# Patient Record
Sex: MALE | Race: WHITE | Employment: OTHER | URBAN - NONMETROPOLITAN AREA
[De-identification: names, ages, dates, MRNs, and addresses within clinical notes are randomized per-mention and may not be internally consistent; named-entity substitution may affect disease eponyms.]

---

## 2017-01-13 DIAGNOSIS — I10 ESSENTIAL HYPERTENSION: ICD-10-CM

## 2017-01-13 RX ORDER — CARVEDILOL 12.5 MG/1
12.5 TABLET ORAL 2 TIMES DAILY WITH MEALS
Qty: 180 TABLET | Refills: 3 | Status: SHIPPED | OUTPATIENT
Start: 2017-01-13 | End: 2017-08-16 | Stop reason: SDUPTHER

## 2017-01-13 RX ORDER — LISINOPRIL 20 MG/1
20 TABLET ORAL DAILY
Qty: 90 TABLET | Refills: 3 | Status: SHIPPED | OUTPATIENT
Start: 2017-01-13 | End: 2017-08-16 | Stop reason: SDUPTHER

## 2017-02-16 ENCOUNTER — TELEPHONE (OUTPATIENT)
Dept: CARDIOLOGY | Age: 72
End: 2017-02-16

## 2017-03-06 ENCOUNTER — TELEPHONE (OUTPATIENT)
Dept: CARDIOLOGY | Age: 72
End: 2017-03-06

## 2017-03-20 RX ORDER — CLOPIDOGREL BISULFATE 75 MG/1
TABLET ORAL
Qty: 90 TABLET | Refills: 0 | Status: SHIPPED | OUTPATIENT
Start: 2017-03-20 | End: 2017-06-30 | Stop reason: SDUPTHER

## 2017-06-30 RX ORDER — CLOPIDOGREL BISULFATE 75 MG/1
TABLET ORAL
Qty: 90 TABLET | Refills: 3 | Status: SHIPPED | OUTPATIENT
Start: 2017-06-30 | End: 2017-06-30 | Stop reason: SDUPTHER

## 2017-06-30 RX ORDER — CLOPIDOGREL BISULFATE 75 MG/1
TABLET ORAL
Qty: 90 TABLET | Refills: 3 | Status: SHIPPED | OUTPATIENT
Start: 2017-06-30 | End: 2017-08-16 | Stop reason: SDUPTHER

## 2017-08-16 ENCOUNTER — OFFICE VISIT (OUTPATIENT)
Dept: CARDIOLOGY | Age: 72
End: 2017-08-16
Payer: COMMERCIAL

## 2017-08-16 ENCOUNTER — TELEPHONE (OUTPATIENT)
Dept: CARDIOLOGY | Age: 72
End: 2017-08-16

## 2017-08-16 VITALS
DIASTOLIC BLOOD PRESSURE: 70 MMHG | HEART RATE: 68 BPM | WEIGHT: 217 LBS | SYSTOLIC BLOOD PRESSURE: 138 MMHG | BODY MASS INDEX: 31.07 KG/M2 | HEIGHT: 70 IN

## 2017-08-16 DIAGNOSIS — E78.5 HYPERLIPIDEMIA, UNSPECIFIED HYPERLIPIDEMIA TYPE: ICD-10-CM

## 2017-08-16 DIAGNOSIS — E78.2 MIXED HYPERLIPIDEMIA: ICD-10-CM

## 2017-08-16 DIAGNOSIS — I25.10 CORONARY ARTERY DISEASE INVOLVING NATIVE CORONARY ARTERY OF NATIVE HEART WITHOUT ANGINA PECTORIS: ICD-10-CM

## 2017-08-16 DIAGNOSIS — I25.5 ISCHEMIC DILATED CARDIOMYOPATHY (HCC): ICD-10-CM

## 2017-08-16 DIAGNOSIS — I25.10 CORONARY ARTERY DISEASE INVOLVING NATIVE CORONARY ARTERY OF NATIVE HEART WITHOUT ANGINA PECTORIS: Primary | ICD-10-CM

## 2017-08-16 DIAGNOSIS — I10 ESSENTIAL HYPERTENSION: ICD-10-CM

## 2017-08-16 DIAGNOSIS — Z12.5 SCREENING PSA (PROSTATE SPECIFIC ANTIGEN): ICD-10-CM

## 2017-08-16 DIAGNOSIS — I34.0 MITRAL VALVE INSUFFICIENCY, UNSPECIFIED ETIOLOGY: ICD-10-CM

## 2017-08-16 DIAGNOSIS — Z95.0 PACEMAKER: ICD-10-CM

## 2017-08-16 DIAGNOSIS — I44.2 CHB (COMPLETE HEART BLOCK) (HCC): ICD-10-CM

## 2017-08-16 DIAGNOSIS — I42.0 ISCHEMIC DILATED CARDIOMYOPATHY (HCC): ICD-10-CM

## 2017-08-16 LAB
ALBUMIN SERPL-MCNC: 4.2 G/DL (ref 3.5–5.2)
ALP BLD-CCNC: 52 U/L (ref 40–130)
ALT SERPL-CCNC: 28 U/L (ref 5–41)
ANION GAP SERPL CALCULATED.3IONS-SCNC: 19 MMOL/L (ref 7–19)
AST SERPL-CCNC: 18 U/L (ref 5–40)
BILIRUB SERPL-MCNC: 0.7 MG/DL (ref 0.2–1.2)
BUN BLDV-MCNC: 17 MG/DL (ref 8–23)
CALCIUM SERPL-MCNC: 10 MG/DL (ref 8.8–10.2)
CHLORIDE BLD-SCNC: 102 MMOL/L (ref 98–111)
CHOLESTEROL, TOTAL: 117 MG/DL (ref 160–199)
CO2: 20 MMOL/L (ref 22–29)
CREAT SERPL-MCNC: 0.9 MG/DL (ref 0.5–1.2)
GFR NON-AFRICAN AMERICAN: >60
GLUCOSE BLD-MCNC: 123 MG/DL (ref 74–109)
HCT VFR BLD CALC: 42.9 % (ref 42–52)
HDLC SERPL-MCNC: 49 MG/DL (ref 55–121)
HEMOGLOBIN: 14.7 G/DL (ref 14–18)
LDL CHOLESTEROL CALCULATED: 42 MG/DL
MCH RBC QN AUTO: 32.2 PG (ref 27–31)
MCHC RBC AUTO-ENTMCNC: 34.3 G/DL (ref 33–37)
MCV RBC AUTO: 93.9 FL (ref 80–94)
PDW BLD-RTO: 12.7 % (ref 11.5–14.5)
PLATELET # BLD: 263 K/UL (ref 130–400)
PMV BLD AUTO: 10.3 FL (ref 9.4–12.4)
POTASSIUM SERPL-SCNC: 4.3 MMOL/L (ref 3.5–5)
PROSTATE SPECIFIC ANTIGEN: 0.71 NG/ML (ref 0–4)
RBC # BLD: 4.57 M/UL (ref 4.7–6.1)
SODIUM BLD-SCNC: 141 MMOL/L (ref 136–145)
TOTAL PROTEIN: 8 G/DL (ref 6.6–8.7)
TRIGL SERPL-MCNC: 132 MG/DL (ref 150–199)
WBC # BLD: 7.8 K/UL (ref 4.8–10.8)

## 2017-08-16 PROCEDURE — 93280 PM DEVICE PROGR EVAL DUAL: CPT | Performed by: NURSE PRACTITIONER

## 2017-08-16 PROCEDURE — 99214 OFFICE O/P EST MOD 30 MIN: CPT | Performed by: NURSE PRACTITIONER

## 2017-08-16 RX ORDER — SILDENAFIL 100 MG/1
100 TABLET, FILM COATED ORAL PRN
Qty: 16 TABLET | Refills: 2 | Status: SHIPPED | OUTPATIENT
Start: 2017-08-16

## 2017-08-16 RX ORDER — ATORVASTATIN CALCIUM 20 MG/1
20 TABLET, FILM COATED ORAL DAILY
Qty: 90 TABLET | Refills: 1 | Status: SHIPPED | OUTPATIENT
Start: 2017-08-16 | End: 2018-01-19 | Stop reason: SDUPTHER

## 2017-08-16 RX ORDER — CARVEDILOL 12.5 MG/1
12.5 TABLET ORAL 2 TIMES DAILY WITH MEALS
Qty: 180 TABLET | Refills: 3 | Status: SHIPPED | OUTPATIENT
Start: 2017-08-16 | End: 2018-02-21 | Stop reason: SDUPTHER

## 2017-08-16 RX ORDER — METOCLOPRAMIDE 10 MG/1
10 TABLET ORAL 4 TIMES DAILY
Qty: 360 TABLET | Refills: 1 | Status: SHIPPED | OUTPATIENT
Start: 2017-08-16 | End: 2019-02-27 | Stop reason: SDUPTHER

## 2017-08-16 RX ORDER — PANTOPRAZOLE SODIUM 40 MG/1
40 TABLET, DELAYED RELEASE ORAL DAILY
Qty: 90 TABLET | Refills: 3 | Status: SHIPPED | OUTPATIENT
Start: 2017-08-16 | End: 2018-02-21 | Stop reason: SDUPTHER

## 2017-08-16 RX ORDER — CLOPIDOGREL BISULFATE 75 MG/1
TABLET ORAL
Qty: 90 TABLET | Refills: 3 | Status: SHIPPED | OUTPATIENT
Start: 2017-08-16 | End: 2018-02-21 | Stop reason: SDUPTHER

## 2017-08-16 RX ORDER — LISINOPRIL 20 MG/1
20 TABLET ORAL DAILY
Qty: 90 TABLET | Refills: 3 | Status: SHIPPED | OUTPATIENT
Start: 2017-08-16 | End: 2018-02-21 | Stop reason: SDUPTHER

## 2017-08-16 RX ORDER — OMEPRAZOLE 20 MG/1
20 CAPSULE, DELAYED RELEASE ORAL DAILY
Qty: 90 CAPSULE | Refills: 1 | Status: SHIPPED | OUTPATIENT
Start: 2017-08-16 | End: 2017-08-16

## 2017-10-24 ENCOUNTER — TELEPHONE (OUTPATIENT)
Dept: CARDIOLOGY | Age: 72
End: 2017-10-24

## 2017-10-24 NOTE — TELEPHONE ENCOUNTER
Patient called requesting a prescription for medication for depression. Advised we only fill cardiac medication in this office and patient would need to call PCP for this type of prescription. Understanding voiced.

## 2017-11-17 ENCOUNTER — TELEPHONE (OUTPATIENT)
Dept: CARDIOLOGY | Age: 72
End: 2017-11-17

## 2018-01-10 ENCOUNTER — OFFICE VISIT (OUTPATIENT)
Dept: URGENT CARE | Age: 73
End: 2018-01-10
Payer: COMMERCIAL

## 2018-01-10 VITALS
DIASTOLIC BLOOD PRESSURE: 80 MMHG | RESPIRATION RATE: 20 BRPM | HEART RATE: 90 BPM | SYSTOLIC BLOOD PRESSURE: 137 MMHG | OXYGEN SATURATION: 96 % | WEIGHT: 216.38 LBS | TEMPERATURE: 98.2 F | BODY MASS INDEX: 31.05 KG/M2

## 2018-01-10 DIAGNOSIS — J02.0 STREP PHARYNGITIS: ICD-10-CM

## 2018-01-10 DIAGNOSIS — R52 BODY ACHES: ICD-10-CM

## 2018-01-10 DIAGNOSIS — J11.1 INFLUENZA: Primary | ICD-10-CM

## 2018-01-10 DIAGNOSIS — J02.9 SORE THROAT: ICD-10-CM

## 2018-01-10 LAB
INFLUENZA A ANTIBODY: POSITIVE
INFLUENZA B ANTIBODY: NEGATIVE
S PYO AG THROAT QL: POSITIVE

## 2018-01-10 PROCEDURE — 87804 INFLUENZA ASSAY W/OPTIC: CPT | Performed by: NURSE PRACTITIONER

## 2018-01-10 PROCEDURE — 87880 STREP A ASSAY W/OPTIC: CPT | Performed by: NURSE PRACTITIONER

## 2018-01-10 PROCEDURE — 99213 OFFICE O/P EST LOW 20 MIN: CPT | Performed by: NURSE PRACTITIONER

## 2018-01-10 RX ORDER — AMOXICILLIN 500 MG/1
500 CAPSULE ORAL 2 TIMES DAILY
Qty: 20 CAPSULE | Refills: 0 | Status: SHIPPED | OUTPATIENT
Start: 2018-01-10 | End: 2018-01-20

## 2018-01-10 RX ORDER — OSELTAMIVIR PHOSPHATE 75 MG/1
75 CAPSULE ORAL 2 TIMES DAILY
Qty: 10 CAPSULE | Refills: 0 | Status: SHIPPED | OUTPATIENT
Start: 2018-01-10 | End: 2018-01-15

## 2018-01-10 ASSESSMENT — ENCOUNTER SYMPTOMS
NAUSEA: 0
FACIAL SWELLING: 0
RESPIRATORY NEGATIVE: 1
COUGH: 0
SINUS PRESSURE: 0
GASTROINTESTINAL NEGATIVE: 1
EYE PAIN: 0
RHINORRHEA: 0
PHOTOPHOBIA: 0
SORE THROAT: 0
VOMITING: 0
ABDOMINAL PAIN: 0

## 2018-01-10 NOTE — PROGRESS NOTES
 Smokeless tobacco: Never Used      Comment: Occassionally would smoke a cigar, but denied any cigarette smoking.  Alcohol use No      Current Outpatient Prescriptions   Medication Sig Dispense Refill    oseltamivir (TAMIFLU) 75 MG capsule Take 1 capsule by mouth 2 times daily for 5 days 10 capsule 0    amoxicillin (AMOXIL) 500 MG capsule Take 1 capsule by mouth 2 times daily for 10 days 20 capsule 0    lisinopril (PRINIVIL;ZESTRIL) 20 MG tablet Take 1 tablet by mouth daily 90 tablet 3    carvedilol (COREG) 12.5 MG tablet Take 1 tablet by mouth 2 times daily (with meals) Indications: Atrial Fibrillation 180 tablet 3    metoclopramide (REGLAN) 10 MG tablet Take 1 tablet by mouth 4 times daily 360 tablet 1    atorvastatin (LIPITOR) 20 MG tablet Take 1 tablet by mouth daily 90 tablet 1    clopidogrel (PLAVIX) 75 MG tablet TAKE ONE TABLET BY MOUTH ONCE DAILY 90 tablet 3    sildenafil (VIAGRA) 100 MG tablet Take 1 tablet by mouth as needed for Erectile Dysfunction 16 tablet 2    pantoprazole (PROTONIX) 40 MG tablet Take 1 tablet by mouth daily 90 tablet 3    aspirin 81 MG tablet Take 81 mg by mouth daily. No current facility-administered medications for this visit. No Known Allergies    Health Maintenance   Topic Date Due    AAA screen  1945    Hepatitis C screen  1945    DTaP/Tdap/Td vaccine (1 - Tdap) 11/05/1965    Colon cancer screen colonoscopy  11/05/1996    Zostavax vaccine  11/05/2006    Pneumococcal low/med risk (1 of 2 - PCV13) 11/05/2011    A1C test (Diabetic or Prediabetic)  03/21/2015    Flu vaccine (1) 09/01/2017    Potassium monitoring  08/16/2018    Creatinine monitoring  08/16/2018    Lipid screen  08/16/2022       Subjective:      Review of Systems   Constitutional: Positive for activity change, appetite change and fever. Negative for chills and fatigue. HENT: Negative.   Negative for congestion, ear pain, facial swelling, hearing loss, rhinorrhea, sinus pressure, sore throat and tinnitus. Eyes: Negative for photophobia, pain and visual disturbance. Respiratory: Negative. Negative for cough. Gastrointestinal: Negative. Negative for abdominal pain, nausea and vomiting. Genitourinary: Negative. Musculoskeletal: Positive for myalgias. Skin: Negative. Neurological: Negative. Hematological: Negative. Psychiatric/Behavioral: Negative. Objective:     Physical Exam   Constitutional: He is oriented to person, place, and time. Vital signs are normal. He appears well-developed and well-nourished. He is active. Non-toxic appearance. He does not have a sickly appearance. He does not appear ill. No distress. HENT:   Head: Normocephalic and atraumatic. Right Ear: Hearing, tympanic membrane, external ear and ear canal normal.   Left Ear: Hearing, tympanic membrane, external ear and ear canal normal.   Nose: Nose normal.   Mouth/Throat: Uvula is midline, oropharynx is clear and moist and mucous membranes are normal.   Eyes: Conjunctivae are normal.   Neck: Normal range of motion. Neck supple. Cardiovascular: Normal rate and regular rhythm. Pulmonary/Chest: Effort normal and breath sounds normal.   Abdominal: Soft. Normal appearance and bowel sounds are normal.   Lymphadenopathy:        Head (right side): No submental, no submandibular, no tonsillar, no preauricular, no posterior auricular and no occipital adenopathy present. Head (left side): No submental, no submandibular, no tonsillar, no preauricular, no posterior auricular and no occipital adenopathy present. Right cervical: No superficial cervical, no deep cervical and no posterior cervical adenopathy present. Left cervical: No superficial cervical, no deep cervical and no posterior cervical adenopathy present. Neurological: He is alert and oriented to person, place, and time. Skin: Skin is warm and intact. No rash noted.    Psychiatric: He has a normal mood and

## 2018-01-15 ENCOUNTER — TELEPHONE (OUTPATIENT)
Dept: CARDIOLOGY | Age: 73
End: 2018-01-15

## 2018-01-19 DIAGNOSIS — E78.5 HYPERLIPIDEMIA, UNSPECIFIED HYPERLIPIDEMIA TYPE: ICD-10-CM

## 2018-01-19 RX ORDER — METOCLOPRAMIDE 10 MG/1
TABLET ORAL
Qty: 360 TABLET | Refills: 1 | OUTPATIENT
Start: 2018-01-19

## 2018-01-19 RX ORDER — ATORVASTATIN CALCIUM 20 MG/1
20 TABLET, FILM COATED ORAL DAILY
Qty: 90 TABLET | Refills: 3 | Status: SHIPPED | OUTPATIENT
Start: 2018-01-19 | End: 2018-02-21 | Stop reason: SDUPTHER

## 2018-01-19 RX ORDER — ATORVASTATIN CALCIUM 20 MG/1
TABLET, FILM COATED ORAL
Qty: 90 TABLET | Refills: 1 | OUTPATIENT
Start: 2018-01-19

## 2018-02-21 ENCOUNTER — OFFICE VISIT (OUTPATIENT)
Dept: CARDIOLOGY | Age: 73
End: 2018-02-21
Payer: COMMERCIAL

## 2018-02-21 VITALS
BODY MASS INDEX: 31.5 KG/M2 | SYSTOLIC BLOOD PRESSURE: 132 MMHG | WEIGHT: 220 LBS | DIASTOLIC BLOOD PRESSURE: 68 MMHG | HEIGHT: 70 IN | HEART RATE: 74 BPM

## 2018-02-21 DIAGNOSIS — I10 ESSENTIAL HYPERTENSION: ICD-10-CM

## 2018-02-21 DIAGNOSIS — I25.10 CORONARY ARTERY DISEASE INVOLVING NATIVE CORONARY ARTERY OF NATIVE HEART WITHOUT ANGINA PECTORIS: Primary | ICD-10-CM

## 2018-02-21 DIAGNOSIS — I44.2 CHB (COMPLETE HEART BLOCK) (HCC): ICD-10-CM

## 2018-02-21 DIAGNOSIS — Z95.0 PACEMAKER: ICD-10-CM

## 2018-02-21 DIAGNOSIS — E78.5 HYPERLIPIDEMIA, UNSPECIFIED HYPERLIPIDEMIA TYPE: ICD-10-CM

## 2018-02-21 PROCEDURE — 99213 OFFICE O/P EST LOW 20 MIN: CPT | Performed by: NURSE PRACTITIONER

## 2018-02-21 PROCEDURE — 93280 PM DEVICE PROGR EVAL DUAL: CPT | Performed by: NURSE PRACTITIONER

## 2018-02-21 RX ORDER — LISINOPRIL 20 MG/1
20 TABLET ORAL DAILY
Qty: 90 TABLET | Refills: 3 | Status: SHIPPED | OUTPATIENT
Start: 2018-02-21 | End: 2019-04-20 | Stop reason: SDUPTHER

## 2018-02-21 RX ORDER — ATORVASTATIN CALCIUM 20 MG/1
20 TABLET, FILM COATED ORAL DAILY
Qty: 90 TABLET | Refills: 3 | Status: SHIPPED | OUTPATIENT
Start: 2018-02-21 | End: 2019-04-25 | Stop reason: SDUPTHER

## 2018-02-21 RX ORDER — CARVEDILOL 12.5 MG/1
12.5 TABLET ORAL 2 TIMES DAILY WITH MEALS
Qty: 180 TABLET | Refills: 3 | Status: SHIPPED | OUTPATIENT
Start: 2018-02-21 | End: 2019-04-20 | Stop reason: SDUPTHER

## 2018-02-21 RX ORDER — PANTOPRAZOLE SODIUM 40 MG/1
40 TABLET, DELAYED RELEASE ORAL DAILY
Qty: 90 TABLET | Refills: 1 | Status: SHIPPED | OUTPATIENT
Start: 2018-02-21 | End: 2021-04-07 | Stop reason: SDUPTHER

## 2018-02-21 RX ORDER — CLOPIDOGREL BISULFATE 75 MG/1
TABLET ORAL
Qty: 90 TABLET | Refills: 3 | Status: SHIPPED | OUTPATIENT
Start: 2018-02-21 | End: 2019-04-20 | Stop reason: SDUPTHER

## 2018-02-21 NOTE — PROGRESS NOTES
significant rhinorrhea or epistaxis. No tinnitus or significant hearing loss. Eyes  no sudden vision change or amaurosis. No corneal arcus, xantholasma, subconjunctival hemorrhage or discharge. Respiratory  no significant wheezing, stridor, apnea or cough. No dyspnea on exertion or shortness of air. Cardiovascular  no exertional chest pain to suggest myocardial ischemia. No orthopnea or PND. No sensation of sustained arrythmia. No occurrence of slow heart rate. No palpitations. No claudication. No leg edema. Gastrointestinal  no abdominal swelling or pain. No blood in stool. No severe constipation, diarrhea, nausea, or vomiting. Genitourinary  no dysuria, frequency, or urgency. No flank pain or hematuria. Musculoskeletal  no back pain or myalgia. No problems with gait. Extremities - no clubbing, cyanosis or edema. Skin  no color change or rash. No pallor. No new surgical incision. Neurologic  no speech difficulty, facial asymmetry or lateralizing weakness. No seizures, presyncope or syncope. No significant dizziness. Hematologic  no easy bruising or excessive bleeding. Psychiatric  no severe anxiety or insomnia. No confusion. All other review of systems are negative. Objective  Vital Signs - /68   Pulse 74   Ht 5' 10\" (1.778 m)   Wt 220 lb (99.8 kg)   BMI 31.57 kg/m²   General - Brenda Espino is alert, cooperative, and pleasant. Well groomed. No acute distress. Body habitus - Body mass index is 31.57 kg/m². HEENT  Head is normocephalic. No circumoral cyanosis. Dentition is normal.  EYES -   Lids normal without ptosis. No discharge, edema or subconjunctival hemorrhage. Neck - Symmetrical without apparent mass or lymphadenopathy. Respiratory - Normal respiratory effort without use of accessory muscles. Ausculatation reveals vesicular breath sounds without crackles, wheezes, rub or rhonchi. Cardiovascular  No jugular venous distention.   Auscultation

## 2018-02-28 RX ORDER — METOCLOPRAMIDE 10 MG/1
10 TABLET ORAL 4 TIMES DAILY
Qty: 360 TABLET | Refills: 3 | OUTPATIENT
Start: 2018-02-28

## 2018-07-03 ENCOUNTER — TELEPHONE (OUTPATIENT)
Dept: CARDIOLOGY | Age: 73
End: 2018-07-03

## 2018-07-03 NOTE — TELEPHONE ENCOUNTER
JOSEFINA Retired. Pt does not want to reschedule until closer to date, due to going out of town.  Pt was made aware that he needs his pacer check completed in Aug.

## 2018-08-20 ENCOUNTER — TELEPHONE (OUTPATIENT)
Dept: CARDIOLOGY | Age: 73
End: 2018-08-20

## 2018-09-26 PROBLEM — Z12.5 SCREENING PSA (PROSTATE SPECIFIC ANTIGEN): Status: RESOLVED | Noted: 2017-08-16 | Resolved: 2018-09-26

## 2018-10-29 RX ORDER — PANTOPRAZOLE SODIUM 40 MG/1
TABLET, DELAYED RELEASE ORAL
Qty: 90 TABLET | Refills: 3 | Status: SHIPPED | OUTPATIENT
Start: 2018-10-29 | End: 2019-02-27 | Stop reason: SDUPTHER

## 2018-12-06 ENCOUNTER — TELEPHONE (OUTPATIENT)
Dept: CARDIOLOGY | Age: 73
End: 2018-12-06

## 2019-02-27 ENCOUNTER — OFFICE VISIT (OUTPATIENT)
Dept: CARDIOLOGY | Age: 74
End: 2019-02-27
Payer: COMMERCIAL

## 2019-02-27 VITALS
BODY MASS INDEX: 29.06 KG/M2 | HEART RATE: 69 BPM | WEIGHT: 203 LBS | SYSTOLIC BLOOD PRESSURE: 130 MMHG | DIASTOLIC BLOOD PRESSURE: 82 MMHG | HEIGHT: 70 IN

## 2019-02-27 DIAGNOSIS — E78.2 MIXED HYPERLIPIDEMIA: ICD-10-CM

## 2019-02-27 DIAGNOSIS — I25.10 CORONARY ARTERY DISEASE INVOLVING NATIVE CORONARY ARTERY OF NATIVE HEART WITHOUT ANGINA PECTORIS: Primary | ICD-10-CM

## 2019-02-27 DIAGNOSIS — Z12.5 SCREENING PSA (PROSTATE SPECIFIC ANTIGEN): ICD-10-CM

## 2019-02-27 DIAGNOSIS — I25.10 CORONARY ARTERY DISEASE INVOLVING NATIVE CORONARY ARTERY OF NATIVE HEART WITHOUT ANGINA PECTORIS: ICD-10-CM

## 2019-02-27 DIAGNOSIS — I10 ESSENTIAL HYPERTENSION: ICD-10-CM

## 2019-02-27 DIAGNOSIS — I25.5 ISCHEMIC DILATED CARDIOMYOPATHY (HCC): ICD-10-CM

## 2019-02-27 DIAGNOSIS — I42.0 ISCHEMIC DILATED CARDIOMYOPATHY (HCC): ICD-10-CM

## 2019-02-27 DIAGNOSIS — I44.2 CHB (COMPLETE HEART BLOCK) (HCC): ICD-10-CM

## 2019-02-27 DIAGNOSIS — Z95.0 PACEMAKER: ICD-10-CM

## 2019-02-27 LAB
ALBUMIN SERPL-MCNC: 4.3 G/DL (ref 3.5–5.2)
ALP BLD-CCNC: 48 U/L (ref 40–130)
ALT SERPL-CCNC: 26 U/L (ref 5–41)
ANION GAP SERPL CALCULATED.3IONS-SCNC: 14 MMOL/L (ref 7–19)
AST SERPL-CCNC: 19 U/L (ref 5–40)
BILIRUB SERPL-MCNC: 0.7 MG/DL (ref 0.2–1.2)
BUN BLDV-MCNC: 13 MG/DL (ref 8–23)
CALCIUM SERPL-MCNC: 9.2 MG/DL (ref 8.8–10.2)
CHLORIDE BLD-SCNC: 102 MMOL/L (ref 98–111)
CHOLESTEROL, TOTAL: 116 MG/DL (ref 160–199)
CO2: 24 MMOL/L (ref 22–29)
CREAT SERPL-MCNC: 1 MG/DL (ref 0.5–1.2)
GFR NON-AFRICAN AMERICAN: >60
GLUCOSE BLD-MCNC: 112 MG/DL (ref 74–109)
HCT VFR BLD CALC: 42.3 % (ref 42–52)
HDLC SERPL-MCNC: 47 MG/DL (ref 55–121)
HEMOGLOBIN: 13.9 G/DL (ref 14–18)
LDL CHOLESTEROL CALCULATED: 43 MG/DL
MCH RBC QN AUTO: 30.8 PG (ref 27–31)
MCHC RBC AUTO-ENTMCNC: 32.9 G/DL (ref 33–37)
MCV RBC AUTO: 93.6 FL (ref 80–94)
PDW BLD-RTO: 12.7 % (ref 11.5–14.5)
PLATELET # BLD: 265 K/UL (ref 130–400)
PMV BLD AUTO: 10.4 FL (ref 9.4–12.4)
POTASSIUM SERPL-SCNC: 4.7 MMOL/L (ref 3.5–5)
PROSTATE SPECIFIC ANTIGEN: 0.72 NG/ML (ref 0–4)
RBC # BLD: 4.52 M/UL (ref 4.7–6.1)
SODIUM BLD-SCNC: 140 MMOL/L (ref 136–145)
TOTAL PROTEIN: 7.3 G/DL (ref 6.6–8.7)
TRIGL SERPL-MCNC: 128 MG/DL (ref 0–149)
WBC # BLD: 7.8 K/UL (ref 4.8–10.8)

## 2019-02-27 PROCEDURE — 93000 ELECTROCARDIOGRAM COMPLETE: CPT | Performed by: NURSE PRACTITIONER

## 2019-02-27 PROCEDURE — 99213 OFFICE O/P EST LOW 20 MIN: CPT | Performed by: NURSE PRACTITIONER

## 2019-02-27 PROCEDURE — 93280 PM DEVICE PROGR EVAL DUAL: CPT | Performed by: NURSE PRACTITIONER

## 2019-02-27 RX ORDER — METOCLOPRAMIDE 10 MG/1
10 TABLET ORAL 4 TIMES DAILY
Qty: 360 TABLET | Refills: 1 | Status: SHIPPED | OUTPATIENT
Start: 2019-02-27 | End: 2021-04-07 | Stop reason: SDUPTHER

## 2019-03-29 PROBLEM — Z12.5 SCREENING PSA (PROSTATE SPECIFIC ANTIGEN): Status: RESOLVED | Noted: 2017-08-16 | Resolved: 2019-03-29

## 2019-04-20 DIAGNOSIS — I10 ESSENTIAL HYPERTENSION: ICD-10-CM

## 2019-04-22 RX ORDER — LISINOPRIL 20 MG/1
TABLET ORAL
Qty: 90 TABLET | Refills: 3 | Status: SHIPPED | OUTPATIENT
Start: 2019-04-22 | End: 2020-03-25 | Stop reason: SDUPTHER

## 2019-04-22 RX ORDER — CLOPIDOGREL BISULFATE 75 MG/1
TABLET ORAL
Qty: 90 TABLET | Refills: 3 | Status: SHIPPED | OUTPATIENT
Start: 2019-04-22 | End: 2020-03-25 | Stop reason: SDUPTHER

## 2019-04-22 RX ORDER — CARVEDILOL 12.5 MG/1
TABLET ORAL
Qty: 180 TABLET | Refills: 3 | Status: SHIPPED | OUTPATIENT
Start: 2019-04-22 | End: 2020-03-25 | Stop reason: SDUPTHER

## 2019-04-25 DIAGNOSIS — E78.5 HYPERLIPIDEMIA, UNSPECIFIED HYPERLIPIDEMIA TYPE: ICD-10-CM

## 2019-04-25 RX ORDER — ATORVASTATIN CALCIUM 20 MG/1
20 TABLET, FILM COATED ORAL DAILY
Qty: 90 TABLET | Refills: 3 | Status: SHIPPED | OUTPATIENT
Start: 2019-04-25 | End: 2020-03-25 | Stop reason: SDUPTHER

## 2019-05-29 ENCOUNTER — TELEPHONE (OUTPATIENT)
Dept: CARDIOLOGY | Age: 74
End: 2019-05-29

## 2019-07-08 ENCOUNTER — TELEPHONE (OUTPATIENT)
Dept: CARDIOLOGY | Age: 74
End: 2019-07-08

## 2019-09-04 ENCOUNTER — OFFICE VISIT (OUTPATIENT)
Dept: CARDIOLOGY | Age: 74
End: 2019-09-04
Payer: COMMERCIAL

## 2019-09-04 VITALS
SYSTOLIC BLOOD PRESSURE: 162 MMHG | HEART RATE: 78 BPM | DIASTOLIC BLOOD PRESSURE: 94 MMHG | HEIGHT: 70 IN | BODY MASS INDEX: 31.5 KG/M2 | WEIGHT: 220 LBS

## 2019-09-04 DIAGNOSIS — I44.2 CHB (COMPLETE HEART BLOCK) (HCC): ICD-10-CM

## 2019-09-04 DIAGNOSIS — Z95.0 PACEMAKER: ICD-10-CM

## 2019-09-04 DIAGNOSIS — I42.0 DILATED CARDIOMYOPATHY (HCC): Primary | ICD-10-CM

## 2019-09-04 PROCEDURE — 93280 PM DEVICE PROGR EVAL DUAL: CPT | Performed by: INTERNAL MEDICINE

## 2019-09-04 PROCEDURE — 99213 OFFICE O/P EST LOW 20 MIN: CPT | Performed by: INTERNAL MEDICINE

## 2019-09-04 ASSESSMENT — ENCOUNTER SYMPTOMS
SHORTNESS OF BREATH: 0
ABDOMINAL DISTENTION: 0
COUGH: 0
ABDOMINAL PAIN: 0
VOMITING: 0
WHEEZING: 0
DIARRHEA: 0
BLOOD IN STOOL: 0
BACK PAIN: 0

## 2019-09-04 NOTE — PROGRESS NOTES
Pacemaker interrogated  Presenting rhythm:  AP VS, AP 32.6%,  0.2%  Battey voltage 6.5 years V  Lead status:  Lead impedance within range and stable  Sensing:  P waves 1.4-2.0 mV,  R waves >31 mV  Thresholds:  Atrial 0.75V @ 0.4ms, ventricular 0.5@ 0.4ms  Observations:  4 ventricular high rate episodes  Reprogramming for sensitivity and threshold testing  Next [facility] appointment:  12/4/19
Exam   Constitutional: He is oriented to person, place, and time. He appears well-developed. Blood pressure right arm sitting 160/80 mmHg, pulse 68 bpm regular   HENT:   Mouth/Throat: No oropharyngeal exudate. Eyes: Right eye exhibits no discharge. Left eye exhibits no discharge. No scleral icterus. Neck: No JVD present. No thyromegaly present. Cardiovascular: Normal rate and regular rhythm. Exam reveals no gallop and no friction rub. No murmur heard. Pulmonary/Chest: No stridor. No respiratory distress. He has no wheezes. He has no rales. Abdominal: Soft. Bowel sounds are normal. He exhibits no distension and no mass. There is no hepatosplenomegaly. There is no tenderness. There is no rebound and no guarding. Musculoskeletal: He exhibits no edema or deformity. Neurological: He is alert and oriented to person, place, and time. He displays normal reflexes. He exhibits normal muscle tone. Coordination normal.   Skin: Skin is warm. No rash noted. No erythema. No pallor. Psychiatric: He has a normal mood and affect. Labs:   CBC: No results for input(s): WBC, HGB, HCT, PLT in the last 72 hours. BMP:No results for input(s): NA, K, CO2, BUN, CREATININE, LABGLOM, GLUCOSE in the last 72 hours. BNP: No results for input(s): BNP in the last 72 hours. PT/INR: No results for input(s): PROTIME, INR in the last 72 hours. APTT:No results for input(s): APTT in the last 72 hours. CARDIAC ENZYMES:No results for input(s): CKTOTAL, CKMB, CKMBINDEX, TROPONINI in the last 72 hours. FASTING LIPID PANEL:  Lab Results   Component Value Date    HDL 47 02/27/2019    LDLDIRECT 83 03/17/2014    LDLCALC 43 02/27/2019    TRIG 128 02/27/2019     LIVER PROFILE:No results for input(s): AST, ALT, LABALBU in the last 72 hours.         Imaging:    Pacemaker interrogated  Presenting rhythm:  AP VS, AP 32.6%,  0.2%  Battey voltage 6.5 years V  Lead status:  Lead impedance within range and stable  Sensing:  P waves 1.4-2.0

## 2019-12-06 ENCOUNTER — TELEPHONE (OUTPATIENT)
Dept: CARDIOLOGY | Age: 74
End: 2019-12-06

## 2020-03-25 PROCEDURE — 93296 REM INTERROG EVL PM/IDS: CPT | Performed by: INTERNAL MEDICINE

## 2020-03-25 PROCEDURE — 93294 REM INTERROG EVL PM/LDLS PM: CPT | Performed by: INTERNAL MEDICINE

## 2020-03-25 RX ORDER — LISINOPRIL 20 MG/1
TABLET ORAL
Qty: 90 TABLET | Refills: 3 | Status: SHIPPED | OUTPATIENT
Start: 2020-03-25 | End: 2021-04-07

## 2020-03-25 RX ORDER — CARVEDILOL 12.5 MG/1
TABLET ORAL
Qty: 180 TABLET | Refills: 3 | Status: SHIPPED | OUTPATIENT
Start: 2020-03-25 | End: 2021-05-10

## 2020-03-25 RX ORDER — CLOPIDOGREL BISULFATE 75 MG/1
TABLET ORAL
Qty: 90 TABLET | Refills: 3 | Status: SHIPPED | OUTPATIENT
Start: 2020-03-25 | End: 2021-05-10

## 2020-03-25 RX ORDER — ATORVASTATIN CALCIUM 20 MG/1
20 TABLET, FILM COATED ORAL DAILY
Qty: 90 TABLET | Refills: 3 | Status: SHIPPED | OUTPATIENT
Start: 2020-03-25 | End: 2021-05-10

## 2020-06-26 ENCOUNTER — TELEPHONE (OUTPATIENT)
Dept: CARDIOLOGY | Age: 75
End: 2020-06-26

## 2020-07-31 ENCOUNTER — TELEPHONE (OUTPATIENT)
Dept: CARDIOLOGY | Age: 75
End: 2020-07-31

## 2020-09-02 ENCOUNTER — TELEPHONE (OUTPATIENT)
Dept: CARDIOLOGY | Age: 75
End: 2020-09-02

## 2020-09-17 ENCOUNTER — TELEPHONE (OUTPATIENT)
Dept: CARDIOLOGY | Age: 75
End: 2020-09-17

## 2020-10-15 ENCOUNTER — TELEPHONE (OUTPATIENT)
Dept: CARDIOLOGY | Age: 75
End: 2020-10-15

## 2020-10-23 ENCOUNTER — TELEPHONE (OUTPATIENT)
Dept: CARDIOLOGY | Age: 75
End: 2020-10-23

## 2020-10-23 NOTE — TELEPHONE ENCOUNTER
10/23 called to let the pt know it is time for a pacemaker reading from home, this needs to be done today or tomorrow.      Called wireless customer not avaliable

## 2020-11-20 ENCOUNTER — TELEPHONE (OUTPATIENT)
Dept: CARDIOLOGY | Age: 75
End: 2020-11-20

## 2021-04-07 ENCOUNTER — OFFICE VISIT (OUTPATIENT)
Dept: CARDIOLOGY CLINIC | Age: 76
End: 2021-04-07
Payer: COMMERCIAL

## 2021-04-07 VITALS
HEIGHT: 70 IN | HEART RATE: 66 BPM | WEIGHT: 227 LBS | OXYGEN SATURATION: 99 % | BODY MASS INDEX: 32.5 KG/M2 | SYSTOLIC BLOOD PRESSURE: 150 MMHG | DIASTOLIC BLOOD PRESSURE: 82 MMHG

## 2021-04-07 DIAGNOSIS — E78.2 MIXED HYPERLIPIDEMIA: ICD-10-CM

## 2021-04-07 DIAGNOSIS — Z95.1 HISTORY OF CORONARY ARTERY BYPASS GRAFT X 1: ICD-10-CM

## 2021-04-07 DIAGNOSIS — K21.9 CHRONIC GERD: ICD-10-CM

## 2021-04-07 DIAGNOSIS — I25.10 CORONARY ARTERY DISEASE INVOLVING NATIVE CORONARY ARTERY OF NATIVE HEART WITHOUT ANGINA PECTORIS: Primary | ICD-10-CM

## 2021-04-07 DIAGNOSIS — Z98.890 H/O MITRAL VALVE REPAIR: ICD-10-CM

## 2021-04-07 DIAGNOSIS — I48.0 PAF (PAROXYSMAL ATRIAL FIBRILLATION) (HCC): ICD-10-CM

## 2021-04-07 DIAGNOSIS — I10 ESSENTIAL HYPERTENSION: ICD-10-CM

## 2021-04-07 DIAGNOSIS — Z95.0 PACEMAKER: ICD-10-CM

## 2021-04-07 DIAGNOSIS — Z98.890 S/P MAZE OPERATION FOR ATRIAL FIBRILLATION: ICD-10-CM

## 2021-04-07 DIAGNOSIS — Z86.79 S/P MAZE OPERATION FOR ATRIAL FIBRILLATION: ICD-10-CM

## 2021-04-07 DIAGNOSIS — I25.10 CORONARY ARTERY DISEASE INVOLVING NATIVE CORONARY ARTERY OF NATIVE HEART WITHOUT ANGINA PECTORIS: ICD-10-CM

## 2021-04-07 LAB
ALBUMIN SERPL-MCNC: 4.2 G/DL (ref 3.5–5.2)
ALP BLD-CCNC: 62 U/L (ref 40–130)
ALT SERPL-CCNC: 36 U/L (ref 5–41)
ANION GAP SERPL CALCULATED.3IONS-SCNC: 9 MMOL/L (ref 7–19)
AST SERPL-CCNC: 29 U/L (ref 5–40)
BILIRUB SERPL-MCNC: 0.5 MG/DL (ref 0.2–1.2)
BUN BLDV-MCNC: 12 MG/DL (ref 8–23)
CALCIUM SERPL-MCNC: 9.2 MG/DL (ref 8.8–10.2)
CHLORIDE BLD-SCNC: 103 MMOL/L (ref 98–111)
CHOLESTEROL, TOTAL: 111 MG/DL (ref 160–199)
CO2: 25 MMOL/L (ref 22–29)
CREAT SERPL-MCNC: 0.9 MG/DL (ref 0.5–1.2)
GFR AFRICAN AMERICAN: >59
GFR NON-AFRICAN AMERICAN: >60
GLUCOSE BLD-MCNC: 187 MG/DL (ref 74–109)
HDLC SERPL-MCNC: 44 MG/DL (ref 55–121)
LDL CHOLESTEROL CALCULATED: 47 MG/DL
POTASSIUM SERPL-SCNC: 5 MMOL/L (ref 3.5–5)
SODIUM BLD-SCNC: 137 MMOL/L (ref 136–145)
TOTAL PROTEIN: 7.5 G/DL (ref 6.6–8.7)
TRIGL SERPL-MCNC: 99 MG/DL (ref 0–149)

## 2021-04-07 PROCEDURE — 93280 PM DEVICE PROGR EVAL DUAL: CPT | Performed by: NURSE PRACTITIONER

## 2021-04-07 PROCEDURE — 99214 OFFICE O/P EST MOD 30 MIN: CPT | Performed by: NURSE PRACTITIONER

## 2021-04-07 RX ORDER — LISINOPRIL 30 MG/1
TABLET ORAL
Qty: 90 TABLET | Refills: 3 | Status: SHIPPED | OUTPATIENT
Start: 2021-04-07 | End: 2021-11-02

## 2021-04-07 RX ORDER — METOCLOPRAMIDE 10 MG/1
10 TABLET ORAL 4 TIMES DAILY
Qty: 360 TABLET | Refills: 1 | Status: SHIPPED | OUTPATIENT
Start: 2021-04-07 | End: 2022-05-02 | Stop reason: SDUPTHER

## 2021-04-07 RX ORDER — PANTOPRAZOLE SODIUM 40 MG/1
40 TABLET, DELAYED RELEASE ORAL DAILY
Qty: 90 TABLET | Refills: 1 | Status: SHIPPED | OUTPATIENT
Start: 2021-04-07 | End: 2022-05-02 | Stop reason: SDUPTHER

## 2021-04-07 NOTE — PATIENT INSTRUCTIONS
New instructions for today:  Next home transmission on pacer 7/12/21. Start taking Lisinopril 30 mg (1) tab daily to better control your blood pressure. Go to first floor outpatient registration today for lab. Monitor your blood pressure twice daily and keep a log. Your blood pressure goal is 130/80 or less. We will discuss in 2 weeks by either scheduled telephone encounter or patient call back. Office phone number [phone]. I recommend that you have an endoscopy due to ongoing acid reflux. It is recommended that you have a heart ultrasound to assess the valve replacement and heart function. Patient Instructions:  Continue current medications as prescribed. Always keep a current medication list. Bring your medications to every office visit. Continue to follow up with primary care provider for non cardiac medical problems. Call the office with any problems, questions or concerns at [phone]. If you have been asked to keep a blood pressure log, do so for 2 weeks. Call the office to report readings to the triage nurse at [phone]. Follow up with cardiologist as scheduled. The following educational material has been included in this after visit summary for your review: Life simple 7. Heart health. Life simple 7  1) Manage blood pressure - high blood pressure is a major risk factor for heart disease and stroke. Keeping blood pressure in health range reduces strain on your heart, arteries and kidneys. Blood pressure goal is less than 130/80. 2) Control cholesterol - contributes to plaque, which can clog arteries and lead to heart disease and stroke. When you control your cholesterol you are giving your arteries their best chance to remain clear. It is recommended that you get cholesterol lab work done once a year. 3) Reduce blood sugar - most of the food we eat is turning into glucose or blood sugar that our body uses for energy.   Over time, high levels of blood sugar can medicines you take. How can you care for yourself at home? Diet  · Use less salt when you cook and eat. This helps lower your blood pressure. Taste food before salting. Add only a little salt when you think you need it. With time, your taste buds will adjust to less salt. · Eat fewer snack items, fast foods, canned soups, and other high-salt, high-fat, processed foods. · Read food labels and try to avoid saturated and trans fats. They increase your risk of heart disease by raising cholesterol levels. · Limit the amount of solid fat-butter, margarine, and shortening-you eat. Use olive, peanut, or canola oil when you cook. Bake, broil, and steam foods instead of frying them. · Eat a variety of fruit and vegetables every day. Dark green, deep orange, red, or yellow fruits and vegetables are especially good for you. Examples include spinach, carrots, peaches, and berries. · Foods high in fiber can reduce your cholesterol and provide important vitamins and minerals. High-fiber foods include whole-grain cereals and breads, oatmeal, beans, brown rice, citrus fruits, and apples. · Eat lean proteins. Heart-healthy proteins include seafood, lean meats and poultry, eggs, beans, peas, nuts, seeds, and soy products. · Limit drinks and foods with added sugar. These include candy, desserts, and soda pop. Lifestyle changes  · If your doctor recommends it, get more exercise. Walking is a good choice. Bit by bit, increase the amount you walk every day. Try for at least 30 minutes on most days of the week. You also may want to swim, bike, or do other activities. · Do not smoke. If you need help quitting, talk to your doctor about stop-smoking programs and medicines. These can increase your chances of quitting for good. Quitting smoking may be the most important step you can take to protect your heart. It is never too late to quit. · Limit alcohol to 2 drinks a day for men and 1 drink a day for women.  Too much alcohol can cause health problems. · Manage other health problems such as diabetes, high blood pressure, and high cholesterol. If you think you may have a problem with alcohol or drug use, talk to your doctor. Medicines  · Take your medicines exactly as prescribed. Call your doctor if you think you are having a problem with your medicine. · If your doctor recommends aspirin, take the amount directed each day. Make sure you take aspirin and not another kind of pain reliever, such as acetaminophen (Tylenol). When should you call for help? [id number] if you have symptoms of a heart attack. These may include:  · Chest pain or pressure, or a strange feeling in the chest.  · Sweating. · Shortness of breath. · Pain, pressure, or a strange feeling in the back, neck, jaw, or upper belly or in one or both shoulders or arms. · Lightheadedness or sudden weakness. · A fast or irregular heartbeat. After you call 911, the  may tell you to chew 1 adult-strength or 2 to 4 low-dose aspirin. Wait for an ambulance. Do not try to drive yourself. Watch closely for changes in your health, and be sure to contact your doctor if you have any problems. Where can you learn more? Go to https://Gobooks.myAchy. org and sign in to your [employer] account. Enter J832 in the retsCloud box to learn more about \"A Healthy Heart: Care Instructions. \"     If you do not have an account, please click on the \"Sign Up Now\" link. Current as of: December 16, 2019               Content Version: 12.5  © 5158-2783 [employer]. Care instructions adapted under license by [facility] ([facility]). If you have questions about a medical condition or this instruction, always ask your healthcare professional. [place] [address] any warranty or liability for your use of this information.

## 2021-04-07 NOTE — PROGRESS NOTES
[facility], [place]. [address] [facility]  [phone] office  [phone] fax      OFFICE VISIT:  2021    Vicki Selby - : 1945  Reason For Visit:  Christy President is a 77 y.o. male who is here for Follow-up (Patient hasnt been here x 2 years, but states he has no cardiac symptoms. ), Coronary Artery Disease, and Cardiomyopathy    History:   Diagnosis Orders   1. Coronary artery disease involving native coronary artery of native heart without angina pectoris  Comprehensive Metabolic Panel   2. Pacemaker     3. Essential hypertension  lisinopril (PRINIVIL;ZESTRIL) 30 MG tablet   4. Mixed hyperlipidemia  Comprehensive Metabolic Panel    Lipid Panel   5. PAF (paroxysmal atrial fibrillation) ([address]     6. S/P Maze operation for atrial fibrillation     7. History of coronary artery bypass graft x 1     8. H/O mitral valve repair     9. Chronic GERD       The patient presents today for cardiology follow up. The patient has mixed ischemic/nonischemic dilated cardiomyopathy with prior severe mitral regurgitation status post mitral valve repair with ring annuloplasty, CABG x1 with SVG to OM, Torrez maze procedure 2014, prior ejection fraction 20%. In addition, the patient has PAF with  prior cardioversion 2014, status post pacemaker placement with no significant recurrence, not on anticoagulation. The patient reports feeling very well. He is preparing to spend the next 6 months in [place]. He established with Dr. Daisy Wade on 19. Dr. Daisy Wade recommended a follow up echo to assess mitral valve repair and EF. Patient opted not to have done. He states today \"I am still not going to have that done, I don't need it and my insurance company woul not like it either. \"  The patient has GERD and continues on Protonix and Reglan. He reports \"sometimes I wish Protonix worked quicker. \"  He has never had GI workup which was highly recommended. The patient denies symptoms to suggest myocardial ischemia, heart failure or arrhythmia. BP runs 943 systolic consistently. The patient's PCP monitors cholesterol. Subjective  Aj Hives denies exertional chest pain, shortness of breath, orthopnea, paroxysmal nocturnal dyspnea, syncope, presyncope, sensed arrhythmia, edema and fatigue. The patient denies numbness or weakness to suggest cerebrovascular accident or transient ischemic attack.       Omar Tamayo has the following history as recorded in [facility]:  Patient Active Problem List   Diagnosis Code    Atrial fibrillation ([facility]) I48.91    CHB (complete heart block) ([facility]) I44.2    Pacemaker Z95.0    Hypertension I10    Mitral regurgitation I34.0    Ischemic dilated cardiomyopathy ([facility]) I25.5, I42.0    CAD (coronary artery disease) I25.10    Mixed hyperlipidemia E78.2     Past Medical History:   Diagnosis Date    Atrial fibrillation ([address] 3/19/14    s/p cardioversion    CAD (coronary artery disease)     CHB (complete heart block) ([facility])     GERD (gastroesophageal reflux disease)     Hyperlipidemia     Hypertension     [facility]     Mitral regurgitation     Pacemaker 3/31/14     Past Surgical History:   Procedure Laterality Date    CARDIAC CATHETERIZATION  3/19/14  MDL    EF15-20%    CARDIAC VALVE REPLACEMENT  03/24/2014    Combined Mitral Valve Repair w/ 30 mm Bryant Annuloplasty Ring, CABG X 1 - SVG-OM, Torrez-MAZE Radiofrequency Ablation (JPO)    CARDIOVERSION  3/19/14  MDL    CORONARY ARTERY BYPASS GRAFT  03/24/2014    Combined Mitral Valve Repair w/ #30mm Bryant Annuloplasty Ring, CABG X 1 - SVG-OM, Torrez-MAZE Radiofrequency Ablation (JPO)    PACEMAKER INSERTION  3/31/14  [facility]     Family History   Problem Relation Age of Onset    Heart Disease Other     Lung Cancer Father     Coronary Art Dis Brother     Coronary Art Dis Brother     High Blood Pressure Neg Hx     Cancer Neg Hx     Diabetes Neg Hx      Social History     Tobacco Use    Smoking status: Former Smoker     Types: Cigarettes    Smokeless tobacco: Never Used   Substance Use Topics    Alcohol use: No      Current Outpatient Medications   Medication Sig Dispense Refill    atorvastatin (LIPITOR) 20 MG tablet Take 1 tablet by mouth daily 90 tablet 3    carvedilol (COREG) 12.5 MG tablet TAKE 1 TABLET BY MOUTH TWICE DAILY WITH MEALS 180 tablet 3    clopidogrel (PLAVIX) 75 MG tablet TAKE 1 TABLET BY MOUTH ONCE DAILY 90 tablet 3    lisinopril (PRINIVIL;ZESTRIL) 20 MG tablet TAKE 1 TABLET BY MOUTH ONCE DAILY 90 tablet 3    metoclopramide (REGLAN) 10 MG tablet Take 1 tablet by mouth 4 times daily 360 tablet 1    pantoprazole (PROTONIX) 40 MG tablet Take 1 tablet by mouth daily 90 tablet 1    sildenafil (VIAGRA) 100 MG tablet Take 1 tablet by mouth as needed for Erectile Dysfunction 16 tablet 2     No current facility-administered medications for this visit. Allergies: Patient has no known allergies. Review of Systems  Constitutional  no appetite change, or unexpected weight change. No fever, chills or diaphoresis. No significant change in activity level or new onset of fatigue. HEENT  no significant rhinorrhea or epistaxis. No tinnitus or significant hearing loss. Eyes  no sudden vision change or amaurosis. No corneal arcus, xantholasma, subconjunctival hemorrhage or discharge. Respiratory  no significant wheezing, stridor, apnea or cough. No dyspnea on exertion or shortness of air. Cardiovascular  no exertional chest pain to suggest myocardial ischemia. No orthopnea or PND. No sensation of sustained arrythmia. No occurrence of slow heart rate. No palpitations. No claudication. Gastrointestinal  no abdominal swelling or pain. No blood in stool. No severe constipation, diarrhea, nausea, or vomiting. + GERD. Genitourinary  no dysuria, frequency, or urgency. No flank pain or hematuria. Musculoskeletal  no back pain or myalgia. No problems with gait. Extremities - no clubbing, cyanosis or extremity edema. Skin  no color change or rash. No pallor. No new surgical incision. Neurologic  no speech difficulty, facial asymmetry or lateralizing weakness. No seizures, presyncope or syncope. No significant dizziness. Hematologic  no easy bruising or excessive bleeding. Psychiatric  no severe anxiety or insomnia. No confusion. All other review of systems are negative. Objective  Vital Signs - BP (!) 170/80   Pulse 66   Ht 5' 10\" (1.778 m)   Wt 227 lb (103 kg)   SpO2 99%   BMI 32.57 kg/m²   General - Yonny Veras is alert, cooperative, and pleasant. Well groomed. No acute distress. Body habitus - Body mass index is 32.57 kg/m². HEENT  Head is normocephalic. No circumoral cyanosis. Dentition is normal.  EYES -   Lids normal without ptosis. No discharge, edema or subconjunctival hemorrhage. Neck - Symmetrical without apparent mass or lymphadenopathy. Respiratory - Normal respiratory effort without use of accessory muscles. Ausculatation reveals vesicular breath sounds without crackles, wheezes, rub or rhonchi. Cardiovascular  No jugular venous distention. Auscultation reveals regular rate and rhythm. No audible clicks, gallop or rub. No murmur. No lower extremity varicosities. No carotid bruits. Abdominal -  No visible distention, mass or pulsations. Extremities - No clubbing or cyanosis. No statis dermatitis or ulcers. No edema. Musculoskeletal -   No Osler's nodes. No kyphosis or scoliosis. Gait is even and regular without limp or shuffle. Ambulates without assistance. Skin -  Warm and dry; no rash or pallor. No new surgical wound. Neurological - No focal neurological deficits. Thought processes coherent. No apparent tremor. Oriented to person, place and time. Psychiatric -  Appropriate affect and mood.      Data reviewed:  3/19/14 [facility] check:  Pacemaker check showed adequate battery status @ 2.77 V. Mode: DDDR. Lead impedances are stable. Pacing:  AP-VS 36%. Reprogramming for sensitivity and threshold testing. Appropriate diagnostics and safety margins noted. A output 0.750 V; P wave 1.00-1.40 mV. V output 0.750 V at 0.4 ms, R wave 22.40-31.36 mV. Sustained arrythmia:  none. Next [employer] remote transmission:  7/12/21. Stable CV status without overt heart failure, sensed arrhythmia or angina. CAD - stable on current medical management. HTN - BP uncontrolled on current regimen. Increase Lisinopril to 30 mg daily. Home BP log with goal 130/80 or less. Patient will call back log in 2 weeks. Hyperlipidemia - lab orders provided today. Continues no Lipitor. PAF -s/p MAZE - no recurrent AF. S/p Mitral valve repair - recommended follow up 2D echo - patient declined. GERD - advised needed an endoscopy. Recommended patient discuss with PCP. Patient is compliant with medication regimen. Previous cardiac history and records reviewed. Continue current medical management for cardiac related condition. Continue other current medications as directed. Continue to follow up with primary care provider for non cardiac medical problems. If your primary care provider is outside of the [facility], please request that your labs be faxed to this office at [phone]. BP goal 130/80 or less. Call the office with any problems, questions or concerns at [phone]. Cardiology follow up as scheduled in [address] appointments. Educational included in patient instructions. Heart health.       FLACO Blackwell

## 2021-05-10 DIAGNOSIS — E78.5 HYPERLIPIDEMIA, UNSPECIFIED HYPERLIPIDEMIA TYPE: ICD-10-CM

## 2021-05-10 RX ORDER — CLOPIDOGREL BISULFATE 75 MG/1
TABLET ORAL
Qty: 90 TABLET | Refills: 3 | Status: SHIPPED | OUTPATIENT
Start: 2021-05-10 | End: 2022-04-12 | Stop reason: SDUPTHER

## 2021-05-10 RX ORDER — CARVEDILOL 12.5 MG/1
TABLET ORAL
Qty: 180 TABLET | Refills: 3 | Status: SHIPPED | OUTPATIENT
Start: 2021-05-10 | End: 2022-04-12 | Stop reason: SDUPTHER

## 2021-05-10 RX ORDER — ATORVASTATIN CALCIUM 20 MG/1
TABLET, FILM COATED ORAL
Qty: 90 TABLET | Refills: 3 | Status: SHIPPED | OUTPATIENT
Start: 2021-05-10 | End: 2022-04-12 | Stop reason: SDUPTHER

## 2021-07-13 ENCOUNTER — TELEPHONE (OUTPATIENT)
Dept: CARDIOLOGY CLINIC | Age: 76
End: 2021-07-13

## 2021-08-20 ENCOUNTER — TELEPHONE (OUTPATIENT)
Dept: CARDIOLOGY CLINIC | Age: 76
End: 2021-08-20

## 2021-09-30 ENCOUNTER — TELEPHONE (OUTPATIENT)
Dept: CARDIOLOGY CLINIC | Age: 76
End: 2021-09-30

## 2021-11-02 ENCOUNTER — OFFICE VISIT (OUTPATIENT)
Dept: CARDIOLOGY CLINIC | Age: 76
End: 2021-11-02
Payer: COMMERCIAL

## 2021-11-02 VITALS
HEART RATE: 74 BPM | OXYGEN SATURATION: 98 % | BODY MASS INDEX: 32.5 KG/M2 | WEIGHT: 227 LBS | HEIGHT: 70 IN | SYSTOLIC BLOOD PRESSURE: 142 MMHG | DIASTOLIC BLOOD PRESSURE: 92 MMHG

## 2021-11-02 DIAGNOSIS — I34.0 MITRAL VALVE INSUFFICIENCY, UNSPECIFIED ETIOLOGY: ICD-10-CM

## 2021-11-02 DIAGNOSIS — I10 ESSENTIAL HYPERTENSION: ICD-10-CM

## 2021-11-02 DIAGNOSIS — I42.8 NONISCHEMIC CARDIOMYOPATHY (HCC): Primary | ICD-10-CM

## 2021-11-02 DIAGNOSIS — I48.0 PAF (PAROXYSMAL ATRIAL FIBRILLATION) (HCC): ICD-10-CM

## 2021-11-02 DIAGNOSIS — Z95.0 PACEMAKER: ICD-10-CM

## 2021-11-02 DIAGNOSIS — I44.2 CHB (COMPLETE HEART BLOCK) (HCC): ICD-10-CM

## 2021-11-02 DIAGNOSIS — E78.2 MIXED HYPERLIPIDEMIA: ICD-10-CM

## 2021-11-02 DIAGNOSIS — I25.10 CORONARY ARTERY DISEASE INVOLVING NATIVE CORONARY ARTERY OF NATIVE HEART WITHOUT ANGINA PECTORIS: ICD-10-CM

## 2021-11-02 PROCEDURE — 99214 OFFICE O/P EST MOD 30 MIN: CPT | Performed by: INTERNAL MEDICINE

## 2021-11-02 RX ORDER — LISINOPRIL 40 MG/1
40 TABLET ORAL DAILY
Qty: 90 TABLET | Refills: 3 | Status: SHIPPED | OUTPATIENT
Start: 2021-11-02 | End: 2022-04-12 | Stop reason: SDUPTHER

## 2021-11-02 ASSESSMENT — ENCOUNTER SYMPTOMS
COUGH: 0
ANAL BLEEDING: 0
BLOOD IN STOOL: 0
SHORTNESS OF BREATH: 0

## 2021-11-02 NOTE — PROGRESS NOTES
Office Visit  Lui Gonzalez is a 77 y.o. male; who present today for Establish Cardiologist (No Cardiac Sx ) and 6 Month Follow-Up      HPI  I am seeing this 71-year-old white male in routine follow-up but it is the first time I am seeing him as the new cardiologist.  He has multiple cardiac issues and in 2014 cardiac catheterization demonstrated severe LV systolic dysfunction with global hypokinesis and ejection fraction of 15-20%, severe mitral regurgitation and he also had a history of paroxysmal atrial fibrillation and found to have coronary artery disease. He underwent open heart surgery and had mitral valve repair with an annuloplasty ring and single vein bypass graft to an obtuse marginal artery. During surgery he also had a Torrez maze procedure and he had complete heart block requiring permanent pacemaker implantation. This is a gentleman who wants to do minimum testing and evaluation and in 2019 it was recommended that he have an echocardiogram but he refused. It was discussed again 6 months ago and still refused. He actually does quite well and lives most of the year in [place], [place]. He states that he is active and does not experience any chest discomfort and no dyspnea at all, no orthopnea and no lower extremity edema. He denies any palpitations and there has been no presyncope or syncope. He does get some fatigue. His pacemaker was interrogated demonstrating normal pacemaker function, no atrial fibrillation or other arrhythmias and estimated battery longevity between 2.5 and 6 years.   Current Outpatient Medications   Medication Sig Dispense Refill    lisinopril (PRINIVIL;ZESTRIL) 40 MG tablet Take 1 tablet by mouth daily 90 tablet 3    carvedilol (COREG) 12.5 MG tablet TAKE 1 TABLET BY MOUTH TWICE DAILY WITH MEALS 180 tablet 3    atorvastatin (LIPITOR) 20 MG tablet Take 1 tablet by mouth once daily 90 tablet 3    clopidogrel (PLAVIX) 75 MG tablet Take 1 tablet by mouth once daily 90 tablet 3    pantoprazole (PROTONIX) 40 MG tablet Take 1 tablet by mouth daily 90 tablet 1    metoclopramide (REGLAN) 10 MG tablet Take 1 tablet by mouth 4 times daily 360 tablet 1    sildenafil (VIAGRA) 100 MG tablet Take 1 tablet by mouth as needed for Erectile Dysfunction 16 tablet 2     No current facility-administered medications for this visit. Medications Discontinued During This Encounter   Medication Reason    lisinopril (PRINIVIL;ZESTRIL) 30 MG tablet         No Known Allergies    Past Medical History:   Diagnosis Date    Atrial fibrillation ([address] 3/19/14    s/p cardioversion    CAD (coronary artery disease)     CHB (complete heart block) (HCC)     GERD (gastroesophageal reflux disease)     Hyperlipidemia     Hypertension     Ischemic dilated cardiomyopathy ([address]     Mitral regurgitation     Pacemaker 3/31/14     Negative - Past Medical History for  No past medical history pertinent negatives.     Past Surgical History:   Procedure Laterality Date    CARDIAC CATHETERIZATION  3/19/14  MDL    EF15-20%    CARDIAC VALVE REPLACEMENT  03/24/2014    Combined Mitral Valve Repair w/ 30 mm Bryant Annuloplasty Ring, CABG X 1 - SVG-OM, Torrez-MAZE Radiofrequency Ablation (JPO)    CARDIOVERSION  3/19/14  MDL    CORONARY ARTERY BYPASS GRAFT  03/24/2014    Combined Mitral Valve Repair w/ #30mm Bryant Annuloplasty Ring, CABG X 1 - SVG-OM, Torrez-MAZE Radiofrequency Ablation (JPO)    PACEMAKER INSERTION  3/31/14  [facility]     Social History     Occupational History    Not on file   Tobacco Use    Smoking status: Former Smoker     Types: Cigarettes    Smokeless tobacco: Never Used   Vaping Use    Vaping Use: Never used   Substance and Sexual Activity    Alcohol use: Yes     Comment: occ    Drug use: No    Sexual activity: Not on file        Family History   Problem Relation Age of Onset    Heart Disease Other     Lung Cancer Father     Coronary Art Dis Brother     Coronary Art Dis Brother     High Blood Pressure Neg Hx     Cancer Neg Hx     Diabetes Neg Hx        Review of Systems  Review of Systems   Constitutional: Positive for fatigue. Negative for fever. Respiratory: Negative for cough and shortness of breath. Cardiovascular: Negative for chest pain, palpitations and leg swelling. Gastrointestinal: Negative for anal bleeding and blood in stool. Neurological: Negative for syncope, facial asymmetry and speech difficulty. Physical Exam  BP (!) 142/92   Pulse 74   Ht 5' 10\" (1.778 m)   Wt 227 lb (103 kg)   SpO2 98%   BMI 32.57 kg/m²    Physical Exam  Constitutional:       Appearance: Normal appearance. He is obese. Cardiovascular:      Rate and Rhythm: Normal rate and regular rhythm. Heart sounds: Normal heart sounds. No murmur heard. No gallop. Pulmonary:      Effort: Pulmonary effort is normal.      Breath sounds: Normal breath sounds. Abdominal:      General: Abdomen is flat. Bowel sounds are normal.      Palpations: Abdomen is soft. Musculoskeletal:         General: No swelling. Skin:     General: Skin is warm and dry. Neurological:      Mental Status: He is alert. Assessment/Plan    EKG Findings:  Not performed today, pacemaker was interrogated    Problem List Items Addressed This Visit        Cardiology Problems    PAF (paroxysmal atrial fibrillation) (HCC)    CHB (complete heart block) (HCC)    Essential hypertension    Mitral regurgitation    CAD (coronary artery disease)    Relevant Medications    lisinopril (PRINIVIL;ZESTRIL) 40 MG tablet    Mixed hyperlipidemia    Relevant Medications    lisinopril (PRINIVIL;ZESTRIL) 40 MG tablet    Nonischemic cardiomyopathy ([facility] - Primary       Other    Pacemaker           Diagnosis Orders   1. Nonischemic cardiomyopathy (HCC)      Severe LV systolic dysfunction, ejection fraction 15-20% by cath, 2014   2.  Mitral valve insufficiency, unspecified etiology      Severe, status post mitral valve repair with annuloplasty ring, concomitant vein graft OM and Torrez-Maze procedure, 2014   3. Coronary artery disease involving native coronary artery of native heart without angina pectoris      CABG x1, vein graft to OM with open heart surgery, 2014   4. PAF (paroxysmal atrial fibrillation) ([facility]      Post Torrez-Maze procedure with open heart surgery, 2014, sinus rhythm maintained on pacemaker interrogation   5. CHB (complete heart block) (HCC)     6. Pacemaker     7. Essential hypertension     8. Mixed hyperlipidemia         Recommendations:   Diet: Low-sodium, low-fat, calorie reduced diet to lose weight  Activity: Moderate activities  Medication Changes: Increase lisinopril for more afterload reduction and elevated blood pressure, continue other medications as prescribed. Clinically this patient is doing very well but he has not had any evaluation of his cardiomyopathy or mitral valve repair since 2014 by his own choice. I talk with him about it again today and expressed the importance of checking this but he refuses. I did recommend that if he develops new cardiovascular symptoms he notify us. He does not want to check his pacemaker in between the 6-month follow-ups in our office and it was explained to him and he understands that he had complete heart block and if there is malfunction or end of life on the battery it could be a critical situation. Orders Placed This Encounter   Medications    lisinopril (PRINIVIL;ZESTRIL) 40 MG tablet     Sig: Take 1 tablet by mouth daily     Dispense:  90 tablet     Refill:  3     No orders of the defined types were placed in this encounter. Return in about 6 months (around 5/2/2022) for darci Schmitt.

## 2022-04-12 ENCOUNTER — TELEPHONE (OUTPATIENT)
Dept: CARDIOLOGY CLINIC | Age: 77
End: 2022-04-12

## 2022-04-12 DIAGNOSIS — E78.5 HYPERLIPIDEMIA, UNSPECIFIED HYPERLIPIDEMIA TYPE: ICD-10-CM

## 2022-04-12 RX ORDER — LISINOPRIL 40 MG/1
40 TABLET ORAL DAILY
Qty: 90 TABLET | Refills: 3 | Status: SHIPPED | OUTPATIENT
Start: 2022-04-12

## 2022-04-12 RX ORDER — CARVEDILOL 12.5 MG/1
TABLET ORAL
Qty: 180 TABLET | Refills: 3 | Status: SHIPPED | OUTPATIENT
Start: 2022-04-12

## 2022-04-12 RX ORDER — ATORVASTATIN CALCIUM 20 MG/1
TABLET, FILM COATED ORAL
Qty: 90 TABLET | Refills: 3 | Status: SHIPPED | OUTPATIENT
Start: 2022-04-12

## 2022-04-12 RX ORDER — CLOPIDOGREL BISULFATE 75 MG/1
TABLET ORAL
Qty: 90 TABLET | Refills: 3 | Status: SHIPPED | OUTPATIENT
Start: 2022-04-12

## 2022-04-12 NOTE — TELEPHONE ENCOUNTER
Patient came into our office requesting the following scripts be sent to [facility] in [facility], in [facility], [address] for a 90 supply.     Lisinopril 40mg x1 daily   Carvedilol 12.5 mg x2 daily  Lipitor 20 mg x1 daily  Plavix 75 mg x1 daily

## 2022-05-02 ENCOUNTER — OFFICE VISIT (OUTPATIENT)
Dept: SURGERY | Age: 77
End: 2022-05-02
Payer: OTHER GOVERNMENT

## 2022-05-02 ENCOUNTER — OFFICE VISIT (OUTPATIENT)
Dept: CARDIOLOGY CLINIC | Age: 77
End: 2022-05-02
Payer: OTHER GOVERNMENT

## 2022-05-02 VITALS — WEIGHT: 226 LBS | TEMPERATURE: 97.1 F | HEIGHT: 70 IN | BODY MASS INDEX: 32.35 KG/M2

## 2022-05-02 VITALS
BODY MASS INDEX: 32.35 KG/M2 | HEIGHT: 70 IN | HEART RATE: 75 BPM | WEIGHT: 226 LBS | SYSTOLIC BLOOD PRESSURE: 160 MMHG | DIASTOLIC BLOOD PRESSURE: 92 MMHG

## 2022-05-02 DIAGNOSIS — L08.9 INFECTED INCLUSION CYST: ICD-10-CM

## 2022-05-02 DIAGNOSIS — L08.9 INFECTED SEBACEOUS CYST: Primary | ICD-10-CM

## 2022-05-02 DIAGNOSIS — I48.0 PAF (PAROXYSMAL ATRIAL FIBRILLATION) (HCC): ICD-10-CM

## 2022-05-02 DIAGNOSIS — Z95.0 PACEMAKER: ICD-10-CM

## 2022-05-02 DIAGNOSIS — L72.0 INFECTED INCLUSION CYST: ICD-10-CM

## 2022-05-02 DIAGNOSIS — I25.10 CORONARY ARTERY DISEASE INVOLVING NATIVE CORONARY ARTERY OF NATIVE HEART WITHOUT ANGINA PECTORIS: Primary | ICD-10-CM

## 2022-05-02 DIAGNOSIS — E78.2 MIXED HYPERLIPIDEMIA: ICD-10-CM

## 2022-05-02 DIAGNOSIS — L72.3 INFECTED SEBACEOUS CYST: Primary | ICD-10-CM

## 2022-05-02 DIAGNOSIS — I10 ESSENTIAL HYPERTENSION: ICD-10-CM

## 2022-05-02 PROCEDURE — 99024 POSTOP FOLLOW-UP VISIT: CPT | Performed by: PHYSICIAN ASSISTANT

## 2022-05-02 PROCEDURE — 99214 OFFICE O/P EST MOD 30 MIN: CPT | Performed by: NURSE PRACTITIONER

## 2022-05-02 PROCEDURE — 10060 I&D ABSCESS SIMPLE/SINGLE: CPT | Performed by: PHYSICIAN ASSISTANT

## 2022-05-02 PROCEDURE — 93280 PM DEVICE PROGR EVAL DUAL: CPT | Performed by: NURSE PRACTITIONER

## 2022-05-02 RX ORDER — PANTOPRAZOLE SODIUM 40 MG/1
40 TABLET, DELAYED RELEASE ORAL DAILY
Qty: 90 TABLET | Refills: 3 | Status: SHIPPED | OUTPATIENT
Start: 2022-05-02

## 2022-05-02 RX ORDER — SULFAMETHOXAZOLE AND TRIMETHOPRIM 800; 160 MG/1; MG/1
1 TABLET ORAL 2 TIMES DAILY
Qty: 20 TABLET | Refills: 0 | Status: SHIPPED | OUTPATIENT
Start: 2022-05-02 | End: 2022-05-12

## 2022-05-02 RX ORDER — METOCLOPRAMIDE 10 MG/1
10 TABLET ORAL 4 TIMES DAILY
Qty: 360 TABLET | Refills: 3 | Status: ON HOLD | OUTPATIENT
Start: 2022-05-02 | End: 2022-05-20 | Stop reason: HOSPADM

## 2022-05-02 NOTE — PROGRESS NOTES
[facility], [place]. [address], [address] [facility]  [phone] office  [phone] fax      OFFICE VISIT:  5/2/2022    [address]: 1945  Reason For Visit:  Bhanu Stanton is a 77 y.o. male who is here for 6 Month Follow-Up (No cardiac symptoms), Coronary Artery Disease, and Cardiomyopathy    History:   Diagnosis Orders   1. Coronary artery disease involving native coronary artery of native heart without angina pectoris     2. PAF (paroxysmal atrial fibrillation) ([address]     3. Essential hypertension     4. Mixed hyperlipidemia     5. Pacemaker     6. Infected inclusion cyst      left flank     The patient presents today for cardiology follow up. Overall, the patient is doing well from a cardiac standpoint. His primary concern is an infected inclusion cyst per left flank. He reports having this one other time in the past.  The patient denies symptoms to suggest myocardial ischemia, heart failure or arrhythmia. BP is well controlled on current regimen. The patient's PCP monitors cholesterol. Subjective  Bhanu Stanton denies exertional chest pain, shortness of breath, orthopnea, paroxysmal nocturnal dyspnea, syncope, presyncope, sensed arrhythmia, edema and fatigue. The patient denies numbness or weakness to suggest cerebrovascular accident or transient ischemic attack.     Warner Stein has the following history as recorded in [facility]:  Patient Active Problem List   Diagnosis Code    PAF (paroxysmal atrial fibrillation) ([facility]) I48.0    CHB (complete heart block) ([address] I44.2    Pacemaker Z95.0    Essential hypertension I10    Mitral regurgitation I34.0    Ischemic dilated cardiomyopathy (HCC) I25.5, I42.0    CAD (coronary artery disease) I25.10    Mixed hyperlipidemia E78.2    Nonischemic cardiomyopathy ([address] I42.8     Past Medical History:   Diagnosis Date    Atrial fibrillation ([address] 3/19/14    s/p cardioversion  CAD (coronary artery disease)     CHB (complete heart block) (HCC)     GERD (gastroesophageal reflux disease)     Hyperlipidemia     Hypertension     Ischemic dilated cardiomyopathy ([address]     Mitral regurgitation     Pacemaker 3/31/14     Past Surgical History:   Procedure Laterality Date    CARDIAC CATHETERIZATION  3/19/14  MDL    EF15-20%    CARDIAC VALVE REPLACEMENT  03/24/2014    Combined Mitral Valve Repair w/ 30 mm Bryant Annuloplasty Ring, CABG X 1 - SVG-OM, Torrez-MAZE Radiofrequency Ablation (JPO)    CARDIOVERSION  3/19/14  JOSEFINA    CORONARY ARTERY BYPASS GRAFT  03/24/2014    Combined Mitral Valve Repair w/ #30mm Bryant Annuloplasty Ring, CABG X 1 - SVG-OM, Torrez-MAZE Radiofrequency Ablation (JPO)    PACEMAKER INSERTION  3/31/14  [facility]     Family History   Problem Relation Age of Onset    Heart Disease Other     Lung Cancer Father     Coronary Art Dis Brother     Coronary Art Dis Brother     High Blood Pressure Neg Hx     Cancer Neg Hx     Diabetes Neg Hx      Social History     Tobacco Use    Smoking status: Former Smoker     Types: Cigarettes    Smokeless tobacco: Never Used   Substance Use Topics    Alcohol use: Yes     Comment: occ      Current Outpatient Medications   Medication Sig Dispense Refill    pantoprazole (PROTONIX) 40 MG tablet Take 1 tablet by mouth daily 90 tablet 3    metoclopramide (REGLAN) 10 MG tablet Take 1 tablet by mouth 4 times daily 360 tablet 3    atorvastatin (LIPITOR) 20 MG tablet Take 1 tablet by mouth once daily 90 tablet 3    carvedilol (COREG) 12.5 MG tablet 1 bid 180 tablet 3    clopidogrel (PLAVIX) 75 MG tablet One daily 90 tablet 3    lisinopril (PRINIVIL;ZESTRIL) 40 MG tablet Take 1 tablet by mouth daily 90 tablet 3    sildenafil (VIAGRA) 100 MG tablet Take 1 tablet by mouth as needed for Erectile Dysfunction 16 tablet 2     No current facility-administered medications for this visit. Allergies: Patient has no known allergies.     Review of Systems  Constitutional  no appetite change, or unexpected weight change. No fever, chills or diaphoresis. No significant change in activity level or new onset of fatigue. HEENT  no significant rhinorrhea or epistaxis. No tinnitus or significant hearing loss. Eyes  no sudden vision change or amaurosis. No corneal arcus, xantholasma, subconjunctival hemorrhage or discharge. Respiratory  no significant wheezing, stridor, apnea or cough. No dyspnea on exertion or shortness of air. Cardiovascular  no exertional chest pain to suggest myocardial ischemia. No orthopnea or PND. No sensation of sustained arrythmia. No occurrence of slow heart rate. No palpitations. No claudication. Gastrointestinal  no abdominal swelling or pain. No blood in stool. No severe constipation, diarrhea, nausea, or vomiting. Genitourinary  no dysuria, frequency, or urgency. No flank pain or hematuria. Musculoskeletal  no back pain or myalgia. No problems with gait. Extremities - no clubbing, cyanosis or extremity edema. Skin  no color change or rash. No pallor. No new surgical incision. Neurologic  no speech difficulty, facial asymmetry or lateralizing weakness. No seizures, presyncope or syncope. No significant dizziness. Hematologic  no easy bruising or excessive bleeding. Psychiatric  no severe anxiety or insomnia. No confusion. All other review of systems are negative. Objective  Vital Signs - BP (!) 160/92   Pulse 75   Ht 5' 10\" (1.778 m)   Wt 226 lb (102.5 kg)   BMI 32.43 kg/m²   General - Bhanu Stanton is alert, cooperative, and pleasant. Well groomed. No acute distress. Body habitus - Body mass index is 32.43 kg/m². HEENT  Head is normocephalic. No circumoral cyanosis. Dentition is normal.  EYES -   Lids normal without ptosis. No discharge, edema or subconjunctival hemorrhage. Neck - Symmetrical without apparent mass or lymphadenopathy.    Respiratory - Normal respiratory effort without use of accessory muscles. Ausculatation reveals vesicular breath sounds without crackles, wheezes, rub or rhonchi. Cardiovascular  No jugular venous distention. Auscultation reveals regular rate and rhythm. No audible clicks, gallop or rub. No murmur. No lower extremity varicosities. No carotid bruits. Abdominal -  No visible distention, mass or pulsations. Extremities - No clubbing or cyanosis. No statis dermatitis or ulcers. No edema. Musculoskeletal -   No Osler's nodes. No kyphosis or scoliosis. Gait is even and regular without limp or shuffle. Ambulates without assistance. Skin -  Warm and dry; no rash or pallor. No new surgical wound. Infected in inclusion cyst noted per left flank. Neurological - No focal neurological deficits. Thought processes coherent. No apparent tremor. Oriented to person, place and time. Psychiatric -  Appropriate affect and mood.      Data reviewed:  Lab Results   Component Value Date    WBC 7.8 02/27/2019    HGB 13.9 (L) 02/27/2019    HCT 42.3 02/27/2019    MCV 93.6 02/27/2019     02/27/2019     Lab Results   Component Value Date     04/07/2021    K 5.0 04/07/2021     04/07/2021    CO2 25 04/07/2021    BUN 12 04/07/2021    CREATININE 0.9 04/07/2021    GLUCOSE 187 (H) 04/07/2021    CALCIUM 9.2 04/07/2021    PROT 7.5 04/07/2021    LABALBU 4.2 04/07/2021    BILITOT 0.5 04/07/2021    ALKPHOS 62 04/07/2021    AST 29 04/07/2021    ALT 36 04/07/2021    LABGLOM >60 04/07/2021    GFRAA >59 04/07/2021    GLOB 2.7 11/18/2016       Lab Results   Component Value Date    CHOL 111 (L) 04/07/2021    CHOL 116 (L) 02/27/2019    CHOL 117 (L) 08/16/2017     Lab Results   Component Value Date    TRIG 99 04/07/2021    TRIG 128 02/27/2019    TRIG 132 (L) 08/16/2017     Lab Results   Component Value Date    HDL 44 (L) 04/07/2021    HDL 47 (L) 02/27/2019    HDL 49 (L) 08/16/2017     Lab Results   Component Value Date    LDLCALC 47 04/07/2021 LDLCALC 43 02/27/2019    LDLCALC 42 08/16/2017     BP Readings from Last 3 Encounters:   05/02/22 (!) 160/92   11/02/21 (!) 142/92   04/07/21 (!) 150/82    Pulse Readings from Last 3 Encounters:   05/02/22 75   11/02/21 74   04/07/21 66        Wt Readings from Last 3 Encounters:   05/02/22 226 lb (102.5 kg)   11/02/21 227 lb (103 kg)   04/07/21 227 lb (103 kg)     Assessment/Plan:   Diagnosis Orders   1. Coronary artery disease involving native coronary artery of native heart without angina pectoris     2. PAF (paroxysmal atrial fibrillation) [facility]     3. Essential hypertension     4. Mixed hyperlipidemia     5. Pacemaker     6. Infected inclusion cyst      left flank   [id number] Score: 3  Disclaimer: Risk Score calculation is dependent on accuracy of patient problem list and past encounter diagnosis. Pacer check:  Pacemaker check showed adequate battery status @ 2.76 V. Mode: DDDR. Lead impedances are stable. Pacing:  AP-VS 21.5%. Reprogramming for sensitivity and threshold testing. Appropriate diagnostics and safety margins noted. A output 0.500 V at 0.40 ms; P wave 1.00-1.40 mV. V output 0.500 V at 0.40 ms, R wave 22.40-31.36 mV. Sustained arrythmia:  none. Next Carelink remote transmission:  3 months. Stable CV status without overt heart failure, sensed arrhythmia or angina. CAD -stable on current medical management. Continue same. PAF - no symptomology to suggest recurrent AF. HTN - BP elevated today but usually well controlled. Advised to home monitormore closely. Goal less than 130/80. Continue same. Infected inclusion cyst left flank - referred to [place] surgery office today for I and D. Patient is compliant with medication regimen. Previous cardiac history and records reviewed. Continue current medical management for cardiac related condition. Continue other current medications as directed.   Continue to follow up with primary care provider for non cardiac medical problems. If your primary care provider is outside of the [facility], please request that your labs be faxed to this office at [phone]. BP goal 130/80 or less. Call the office with any problems, questions or concerns at [phone]. Cardiology follow up as scheduled in [address] appointments. Educational included in patient instructions. Heart health.       Gaudencio Amaral APRN

## 2022-05-17 ENCOUNTER — APPOINTMENT (OUTPATIENT)
Dept: GENERAL RADIOLOGY | Age: 77
DRG: 871 | End: 2022-05-17
Payer: MEDICARE

## 2022-05-17 ENCOUNTER — APPOINTMENT (OUTPATIENT)
Dept: CT IMAGING | Age: 77
DRG: 871 | End: 2022-05-17
Payer: MEDICARE

## 2022-05-17 ENCOUNTER — HOSPITAL ENCOUNTER (INPATIENT)
Age: 77
LOS: 3 days | Discharge: HOME | DRG: 871 | End: 2022-05-20
Attending: EMERGENCY MEDICINE | Admitting: STUDENT IN AN ORGANIZED HEALTH CARE EDUCATION/TRAINING PROGRAM
Payer: MEDICARE

## 2022-05-17 DIAGNOSIS — E87.20 LACTIC ACIDOSIS: ICD-10-CM

## 2022-05-17 DIAGNOSIS — U07.1 COVID-19: ICD-10-CM

## 2022-05-17 DIAGNOSIS — I47.1 PAROXYSMAL SUPRAVENTRICULAR TACHYCARDIA (HCC): ICD-10-CM

## 2022-05-17 DIAGNOSIS — J96.01 ACUTE RESPIRATORY FAILURE WITH HYPOXEMIA (HCC): Primary | ICD-10-CM

## 2022-05-17 LAB
ACETONE BLOOD: NEGATIVE
ADENOVIRUS BY PCR: NOT DETECTED
ALBUMIN SERPL-MCNC: 4.3 G/DL (ref 3.5–5.2)
ALP BLD-CCNC: 55 U/L (ref 40–130)
ALT SERPL-CCNC: 48 U/L (ref 5–41)
ANION GAP SERPL CALCULATED.3IONS-SCNC: 24 MMOL/L (ref 7–19)
APTT: 28.8 SEC (ref 26–36.2)
AST SERPL-CCNC: 65 U/L (ref 5–40)
BASE EXCESS ARTERIAL: -13.6 MMOL/L (ref -2–2)
BASOPHILS ABSOLUTE: 0.1 K/UL (ref 0–0.2)
BASOPHILS RELATIVE PERCENT: 0.4 % (ref 0–1)
BILIRUB SERPL-MCNC: 1 MG/DL (ref 0.2–1.2)
BILIRUBIN URINE: NEGATIVE
BLOOD, URINE: NEGATIVE
BORDETELLA PARAPERTUSSIS BY PCR: NOT DETECTED
BORDETELLA PERTUSSIS BY PCR: NOT DETECTED
BUN BLDV-MCNC: 14 MG/DL (ref 8–23)
C-REACTIVE PROTEIN: 3.12 MG/DL (ref 0–0.5)
CALCIUM SERPL-MCNC: 9.4 MG/DL (ref 8.8–10.2)
CARBOXYHEMOGLOBIN ARTERIAL: 1.1 % (ref 0–5)
CHLAMYDOPHILIA PNEUMONIAE BY PCR: NOT DETECTED
CHLORIDE BLD-SCNC: 99 MMOL/L (ref 98–111)
CLARITY: CLEAR
CO2: 14 MMOL/L (ref 22–29)
COLOR: YELLOW
CORONAVIRUS 229E BY PCR: NOT DETECTED
CORONAVIRUS HKU1 BY PCR: NOT DETECTED
CORONAVIRUS NL63 BY PCR: NOT DETECTED
CORONAVIRUS OC43 BY PCR: NOT DETECTED
CREAT SERPL-MCNC: 1.5 MG/DL (ref 0.5–1.2)
D DIMER: 1.45 UG/ML FEU (ref 0–0.48)
EKG P AXIS: 77 DEGREES
EKG P AXIS: NORMAL DEGREES
EKG P-R INTERVAL: 164 MS
EKG P-R INTERVAL: NORMAL MS
EKG Q-T INTERVAL: 294 MS
EKG Q-T INTERVAL: 310 MS
EKG QRS DURATION: 86 MS
EKG QRS DURATION: 90 MS
EKG QTC CALCULATION (BAZETT): 420 MS
EKG QTC CALCULATION (BAZETT): 463 MS
EKG T AXIS: 75 DEGREES
EKG T AXIS: 85 DEGREES
EOSINOPHILS ABSOLUTE: 0.1 K/UL (ref 0–0.6)
EOSINOPHILS RELATIVE PERCENT: 1 % (ref 0–5)
FERRITIN: 479.8 NG/ML (ref 30–400)
GFR AFRICAN AMERICAN: 55
GFR NON-AFRICAN AMERICAN: 46
GLUCOSE BLD-MCNC: 165 MG/DL (ref 70–99)
GLUCOSE BLD-MCNC: 195 MG/DL (ref 70–99)
GLUCOSE BLD-MCNC: 202 MG/DL (ref 70–99)
GLUCOSE BLD-MCNC: 209 MG/DL (ref 70–99)
GLUCOSE BLD-MCNC: 284 MG/DL
GLUCOSE BLD-MCNC: 284 MG/DL (ref 70–99)
GLUCOSE BLD-MCNC: 305 MG/DL (ref 74–109)
GLUCOSE URINE: 100 MG/DL
HBA1C MFR BLD: 7.3 % (ref 4–6)
HCO3 ARTERIAL: 11.3 MMOL/L (ref 22–26)
HCT VFR BLD CALC: 43.5 % (ref 42–52)
HEMOGLOBIN, ART, EXTENDED: 13.7 G/DL (ref 14–18)
HEMOGLOBIN: 13.7 G/DL (ref 14–18)
HUMAN METAPNEUMOVIRUS BY PCR: NOT DETECTED
HUMAN RHINOVIRUS/ENTEROVIRUS BY PCR: NOT DETECTED
IMMATURE GRANULOCYTES #: 0.1 K/UL
INFLUENZA A BY PCR: NOT DETECTED
INFLUENZA B BY PCR: NOT DETECTED
INR BLD: 1.23 (ref 0.88–1.18)
KETONES, URINE: ABNORMAL MG/DL
LACTATE DEHYDROGENASE: 263 U/L (ref 91–215)
LACTIC ACID: 10.6 MMOL/L (ref 0.5–1.9)
LACTIC ACID: 2.8 MMOL/L (ref 0.5–1.9)
LEUKOCYTE ESTERASE, URINE: NEGATIVE
LYMPHOCYTES ABSOLUTE: 3.4 K/UL (ref 1.1–4.5)
LYMPHOCYTES RELATIVE PERCENT: 29.6 % (ref 20–40)
MCH RBC QN AUTO: 31.1 PG (ref 27–31)
MCHC RBC AUTO-ENTMCNC: 31.5 G/DL (ref 33–37)
MCV RBC AUTO: 98.6 FL (ref 80–94)
METHEMOGLOBIN ARTERIAL: 0.4 %
MONOCYTES ABSOLUTE: 0.9 K/UL (ref 0–0.9)
MONOCYTES RELATIVE PERCENT: 7.9 % (ref 0–10)
MYCOPLASMA PNEUMONIAE BY PCR: NOT DETECTED
NEUTROPHILS ABSOLUTE: 6.9 K/UL (ref 1.5–7.5)
NEUTROPHILS RELATIVE PERCENT: 60.1 % (ref 50–65)
NITRITE, URINE: NEGATIVE
O2 CONTENT ARTERIAL: 18.6 ML/DL
O2 SAT, ARTERIAL: 95.8 %
O2 THERAPY: ABNORMAL
PARAINFLUENZA VIRUS 1 BY PCR: NOT DETECTED
PARAINFLUENZA VIRUS 2 BY PCR: NOT DETECTED
PARAINFLUENZA VIRUS 3 BY PCR: NOT DETECTED
PARAINFLUENZA VIRUS 4 BY PCR: NOT DETECTED
PCO2 ARTERIAL: 24 MMHG (ref 35–45)
PDW BLD-RTO: 13.4 % (ref 11.5–14.5)
PERFORMED ON: ABNORMAL
PH ARTERIAL: 7.28 (ref 7.35–7.45)
PH UA: 5 (ref 5–8)
PLATELET # BLD: 292 K/UL (ref 130–400)
PMV BLD AUTO: 9.8 FL (ref 9.4–12.4)
PO2 ARTERIAL: 107 MMHG (ref 80–100)
POTASSIUM REFLEX MAGNESIUM: 4.1 MMOL/L (ref 3.5–5)
POTASSIUM, WHOLE BLOOD: 4.1
PRO-BNP: 1141 PG/ML (ref 0–1800)
PROCALCITONIN: 0.12 NG/ML (ref 0–0.09)
PROTEIN UA: NEGATIVE MG/DL
PROTHROMBIN TIME: 15.5 SEC (ref 12–14.6)
RBC # BLD: 4.41 M/UL (ref 4.7–6.1)
RESPIRATORY SYNCYTIAL VIRUS BY PCR: NOT DETECTED
SARS-COV-2, PCR: DETECTED
SODIUM BLD-SCNC: 137 MMOL/L (ref 136–145)
SPECIFIC GRAVITY UA: 1.02 (ref 1–1.03)
TOTAL PROTEIN: 7.1 G/DL (ref 6.6–8.7)
TROPONIN: 0.03 NG/ML (ref 0–0.03)
UROBILINOGEN, URINE: 0.2 E.U./DL
WBC # BLD: 11.5 K/UL (ref 4.8–10.8)

## 2022-05-17 PROCEDURE — 83615 LACTATE (LD) (LDH) ENZYME: CPT

## 2022-05-17 PROCEDURE — 93010 ELECTROCARDIOGRAM REPORT: CPT | Performed by: INTERNAL MEDICINE

## 2022-05-17 PROCEDURE — 85610 PROTHROMBIN TIME: CPT

## 2022-05-17 PROCEDURE — 82947 ASSAY GLUCOSE BLOOD QUANT: CPT

## 2022-05-17 PROCEDURE — 2500000003 HC RX 250 WO HCPCS: Performed by: EMERGENCY MEDICINE

## 2022-05-17 PROCEDURE — 96375 TX/PRO/DX INJ NEW DRUG ADDON: CPT

## 2022-05-17 PROCEDURE — 83880 ASSAY OF NATRIURETIC PEPTIDE: CPT

## 2022-05-17 PROCEDURE — 82803 BLOOD GASES ANY COMBINATION: CPT

## 2022-05-17 PROCEDURE — 71045 X-RAY EXAM CHEST 1 VIEW: CPT

## 2022-05-17 PROCEDURE — 6370000000 HC RX 637 (ALT 250 FOR IP): Performed by: STUDENT IN AN ORGANIZED HEALTH CARE EDUCATION/TRAINING PROGRAM

## 2022-05-17 PROCEDURE — 6360000002 HC RX W HCPCS: Performed by: STUDENT IN AN ORGANIZED HEALTH CARE EDUCATION/TRAINING PROGRAM

## 2022-05-17 PROCEDURE — 93005 ELECTROCARDIOGRAM TRACING: CPT | Performed by: EMERGENCY MEDICINE

## 2022-05-17 PROCEDURE — 84145 PROCALCITONIN (PCT): CPT

## 2022-05-17 PROCEDURE — 94660 CPAP INITIATION&MGMT: CPT

## 2022-05-17 PROCEDURE — 36600 WITHDRAWAL OF ARTERIAL BLOOD: CPT

## 2022-05-17 PROCEDURE — 2580000003 HC RX 258: Performed by: STUDENT IN AN ORGANIZED HEALTH CARE EDUCATION/TRAINING PROGRAM

## 2022-05-17 PROCEDURE — 71275 CT ANGIOGRAPHY CHEST: CPT

## 2022-05-17 PROCEDURE — XW0DXM6 INTRODUCTION OF BARICITINIB INTO MOUTH AND PHARYNX, EXTERNAL APPROACH, NEW TECHNOLOGY GROUP 6: ICD-10-PCS | Performed by: INTERNAL MEDICINE

## 2022-05-17 PROCEDURE — 80053 COMPREHEN METABOLIC PANEL: CPT

## 2022-05-17 PROCEDURE — 6360000002 HC RX W HCPCS: Performed by: EMERGENCY MEDICINE

## 2022-05-17 PROCEDURE — 81003 URINALYSIS AUTO W/O SCOPE: CPT

## 2022-05-17 PROCEDURE — 83605 ASSAY OF LACTIC ACID: CPT

## 2022-05-17 PROCEDURE — 86140 C-REACTIVE PROTEIN: CPT

## 2022-05-17 PROCEDURE — 85025 COMPLETE CBC W/AUTO DIFF WBC: CPT

## 2022-05-17 PROCEDURE — 1210000000 HC MED SURG R&B

## 2022-05-17 PROCEDURE — 82728 ASSAY OF FERRITIN: CPT

## 2022-05-17 PROCEDURE — 85730 THROMBOPLASTIN TIME PARTIAL: CPT

## 2022-05-17 PROCEDURE — 96374 THER/PROPH/DIAG INJ IV PUSH: CPT

## 2022-05-17 PROCEDURE — 36415 COLL VENOUS BLD VENIPUNCTURE: CPT

## 2022-05-17 PROCEDURE — 6360000002 HC RX W HCPCS: Performed by: INTERNAL MEDICINE

## 2022-05-17 PROCEDURE — 85379 FIBRIN DEGRADATION QUANT: CPT

## 2022-05-17 PROCEDURE — 74177 CT ABD & PELVIS W/CONTRAST: CPT

## 2022-05-17 PROCEDURE — 83036 HEMOGLOBIN GLYCOSYLATED A1C: CPT

## 2022-05-17 PROCEDURE — 99285 EMERGENCY DEPT VISIT HI MDM: CPT

## 2022-05-17 PROCEDURE — 6360000004 HC RX CONTRAST MEDICATION: Performed by: EMERGENCY MEDICINE

## 2022-05-17 PROCEDURE — 2700000000 HC OXYGEN THERAPY PER DAY

## 2022-05-17 PROCEDURE — 82009 KETONE BODYS QUAL: CPT

## 2022-05-17 PROCEDURE — 3E0333Z INTRODUCTION OF ANTI-INFLAMMATORY INTO PERIPHERAL VEIN, PERCUTANEOUS APPROACH: ICD-10-PCS | Performed by: INTERNAL MEDICINE

## 2022-05-17 PROCEDURE — 99254 IP/OBS CNSLTJ NEW/EST MOD 60: CPT | Performed by: INTERNAL MEDICINE

## 2022-05-17 PROCEDURE — 84484 ASSAY OF TROPONIN QUANT: CPT

## 2022-05-17 PROCEDURE — 0202U NFCT DS 22 TRGT SARS-COV-2: CPT

## 2022-05-17 RX ORDER — SODIUM CHLORIDE 0.9 % (FLUSH) 0.9 %
5-40 SYRINGE (ML) INJECTION PRN
Status: DISCONTINUED | OUTPATIENT
Start: 2022-05-17 | End: 2022-05-20 | Stop reason: HOSPADM

## 2022-05-17 RX ORDER — ACETAMINOPHEN 325 MG/1
650 TABLET ORAL EVERY 6 HOURS PRN
Status: DISCONTINUED | OUTPATIENT
Start: 2022-05-17 | End: 2022-05-20 | Stop reason: HOSPADM

## 2022-05-17 RX ORDER — METOCLOPRAMIDE 10 MG/1
10 TABLET ORAL 4 TIMES DAILY
Status: DISCONTINUED | OUTPATIENT
Start: 2022-05-17 | End: 2022-05-17

## 2022-05-17 RX ORDER — PANTOPRAZOLE SODIUM 40 MG/1
40 TABLET, DELAYED RELEASE ORAL DAILY
Status: DISCONTINUED | OUTPATIENT
Start: 2022-05-17 | End: 2022-05-20 | Stop reason: HOSPADM

## 2022-05-17 RX ORDER — POTASSIUM CHLORIDE 7.45 MG/ML
10 INJECTION INTRAVENOUS PRN
Status: DISCONTINUED | OUTPATIENT
Start: 2022-05-17 | End: 2022-05-20 | Stop reason: HOSPADM

## 2022-05-17 RX ORDER — DEXAMETHASONE SODIUM PHOSPHATE 10 MG/ML
6 INJECTION, SOLUTION INTRAMUSCULAR; INTRAVENOUS EVERY 12 HOURS
Status: DISCONTINUED | OUTPATIENT
Start: 2022-05-17 | End: 2022-05-18

## 2022-05-17 RX ORDER — ATORVASTATIN CALCIUM 20 MG/1
20 TABLET, FILM COATED ORAL DAILY
Status: DISCONTINUED | OUTPATIENT
Start: 2022-05-17 | End: 2022-05-20 | Stop reason: HOSPADM

## 2022-05-17 RX ORDER — ENOXAPARIN SODIUM 100 MG/ML
40 INJECTION SUBCUTANEOUS DAILY
Status: DISCONTINUED | OUTPATIENT
Start: 2022-05-17 | End: 2022-05-17

## 2022-05-17 RX ORDER — ENOXAPARIN SODIUM 100 MG/ML
1 INJECTION SUBCUTANEOUS 2 TIMES DAILY
Status: DISCONTINUED | OUTPATIENT
Start: 2022-05-17 | End: 2022-05-17

## 2022-05-17 RX ORDER — METOCLOPRAMIDE 5 MG/1
5 TABLET ORAL 4 TIMES DAILY
Status: DISCONTINUED | OUTPATIENT
Start: 2022-05-17 | End: 2022-05-18

## 2022-05-17 RX ORDER — DILTIAZEM HYDROCHLORIDE 5 MG/ML
12.5 INJECTION INTRAVENOUS ONCE
Status: COMPLETED | OUTPATIENT
Start: 2022-05-17 | End: 2022-05-17

## 2022-05-17 RX ORDER — ONDANSETRON 4 MG/1
4 TABLET, ORALLY DISINTEGRATING ORAL EVERY 8 HOURS PRN
Status: DISCONTINUED | OUTPATIENT
Start: 2022-05-17 | End: 2022-05-20 | Stop reason: HOSPADM

## 2022-05-17 RX ORDER — NITROGLYCERIN 20 MG/100ML
5-200 INJECTION INTRAVENOUS CONTINUOUS
Status: DISCONTINUED | OUTPATIENT
Start: 2022-05-17 | End: 2022-05-17

## 2022-05-17 RX ORDER — POLYETHYLENE GLYCOL 3350 17 G/17G
17 POWDER, FOR SOLUTION ORAL DAILY PRN
Status: DISCONTINUED | OUTPATIENT
Start: 2022-05-17 | End: 2022-05-20 | Stop reason: HOSPADM

## 2022-05-17 RX ORDER — ALBUTEROL SULFATE 90 UG/1
2 AEROSOL, METERED RESPIRATORY (INHALATION) 4 TIMES DAILY
Status: DISCONTINUED | OUTPATIENT
Start: 2022-05-17 | End: 2022-05-20 | Stop reason: HOSPADM

## 2022-05-17 RX ORDER — CARVEDILOL 12.5 MG/1
12.5 TABLET ORAL 2 TIMES DAILY WITH MEALS
Status: DISCONTINUED | OUTPATIENT
Start: 2022-05-17 | End: 2022-05-20 | Stop reason: HOSPADM

## 2022-05-17 RX ORDER — INSULIN LISPRO 100 [IU]/ML
0-6 INJECTION, SOLUTION INTRAVENOUS; SUBCUTANEOUS NIGHTLY
Status: DISCONTINUED | OUTPATIENT
Start: 2022-05-17 | End: 2022-05-20 | Stop reason: HOSPADM

## 2022-05-17 RX ORDER — POTASSIUM CHLORIDE 20 MEQ/1
40 TABLET, EXTENDED RELEASE ORAL PRN
Status: DISCONTINUED | OUTPATIENT
Start: 2022-05-17 | End: 2022-05-20 | Stop reason: HOSPADM

## 2022-05-17 RX ORDER — SODIUM CHLORIDE 9 MG/ML
INJECTION, SOLUTION INTRAVENOUS PRN
Status: DISCONTINUED | OUTPATIENT
Start: 2022-05-17 | End: 2022-05-20 | Stop reason: HOSPADM

## 2022-05-17 RX ORDER — LISINOPRIL 20 MG/1
40 TABLET ORAL DAILY
Status: DISCONTINUED | OUTPATIENT
Start: 2022-05-17 | End: 2022-05-20 | Stop reason: HOSPADM

## 2022-05-17 RX ORDER — DEXAMETHASONE SODIUM PHOSPHATE 10 MG/ML
8 INJECTION, SOLUTION INTRAMUSCULAR; INTRAVENOUS ONCE
Status: COMPLETED | OUTPATIENT
Start: 2022-05-17 | End: 2022-05-17

## 2022-05-17 RX ORDER — ACETAMINOPHEN 650 MG/1
650 SUPPOSITORY RECTAL EVERY 6 HOURS PRN
Status: DISCONTINUED | OUTPATIENT
Start: 2022-05-17 | End: 2022-05-20 | Stop reason: HOSPADM

## 2022-05-17 RX ORDER — MAGNESIUM SULFATE IN WATER 40 MG/ML
2000 INJECTION, SOLUTION INTRAVENOUS PRN
Status: DISCONTINUED | OUTPATIENT
Start: 2022-05-17 | End: 2022-05-20 | Stop reason: HOSPADM

## 2022-05-17 RX ORDER — INSULIN LISPRO 100 [IU]/ML
0-12 INJECTION, SOLUTION INTRAVENOUS; SUBCUTANEOUS
Status: DISCONTINUED | OUTPATIENT
Start: 2022-05-17 | End: 2022-05-20 | Stop reason: HOSPADM

## 2022-05-17 RX ORDER — CLOPIDOGREL BISULFATE 75 MG/1
75 TABLET ORAL DAILY
Status: DISCONTINUED | OUTPATIENT
Start: 2022-05-17 | End: 2022-05-20 | Stop reason: HOSPADM

## 2022-05-17 RX ORDER — ENOXAPARIN SODIUM 100 MG/ML
1 INJECTION SUBCUTANEOUS 2 TIMES DAILY
Status: DISCONTINUED | OUTPATIENT
Start: 2022-05-17 | End: 2022-05-20 | Stop reason: HOSPADM

## 2022-05-17 RX ORDER — BUDESONIDE AND FORMOTEROL FUMARATE DIHYDRATE 160; 4.5 UG/1; UG/1
2 AEROSOL RESPIRATORY (INHALATION) 2 TIMES DAILY
Status: DISCONTINUED | OUTPATIENT
Start: 2022-05-17 | End: 2022-05-20 | Stop reason: HOSPADM

## 2022-05-17 RX ORDER — SODIUM CHLORIDE 0.9 % (FLUSH) 0.9 %
5-40 SYRINGE (ML) INJECTION EVERY 12 HOURS SCHEDULED
Status: DISCONTINUED | OUTPATIENT
Start: 2022-05-17 | End: 2022-05-20 | Stop reason: HOSPADM

## 2022-05-17 RX ORDER — 0.9 % SODIUM CHLORIDE 0.9 %
500 INTRAVENOUS SOLUTION INTRAVENOUS ONCE
Status: COMPLETED | OUTPATIENT
Start: 2022-05-17 | End: 2022-05-17

## 2022-05-17 RX ORDER — ONDANSETRON 2 MG/ML
4 INJECTION INTRAMUSCULAR; INTRAVENOUS EVERY 6 HOURS PRN
Status: DISCONTINUED | OUTPATIENT
Start: 2022-05-17 | End: 2022-05-20 | Stop reason: HOSPADM

## 2022-05-17 RX ORDER — FUROSEMIDE 10 MG/ML
40 INJECTION INTRAMUSCULAR; INTRAVENOUS ONCE
Status: COMPLETED | OUTPATIENT
Start: 2022-05-17 | End: 2022-05-17

## 2022-05-17 RX ADMIN — CARVEDILOL 12.5 MG: 12.5 TABLET, FILM COATED ORAL at 17:10

## 2022-05-17 RX ADMIN — FUROSEMIDE 40 MG: 10 INJECTION, SOLUTION INTRAMUSCULAR; INTRAVENOUS at 03:14

## 2022-05-17 RX ADMIN — DEXAMETHASONE SODIUM PHOSPHATE 6 MG: 10 INJECTION INTRAMUSCULAR; INTRAVENOUS at 17:10

## 2022-05-17 RX ADMIN — INSULIN LISPRO 2 UNITS: 100 INJECTION, SOLUTION INTRAVENOUS; SUBCUTANEOUS at 16:38

## 2022-05-17 RX ADMIN — INSULIN LISPRO 4 UNITS: 100 INJECTION, SOLUTION INTRAVENOUS; SUBCUTANEOUS at 13:17

## 2022-05-17 RX ADMIN — ALBUTEROL SULFATE 2 PUFF: 90 AEROSOL, METERED RESPIRATORY (INHALATION) at 13:16

## 2022-05-17 RX ADMIN — INSULIN LISPRO 4 UNITS: 100 INJECTION, SOLUTION INTRAVENOUS; SUBCUTANEOUS at 08:00

## 2022-05-17 RX ADMIN — ATORVASTATIN CALCIUM 20 MG: 20 TABLET, FILM COATED ORAL at 08:54

## 2022-05-17 RX ADMIN — SODIUM CHLORIDE 500 ML: 9 INJECTION, SOLUTION INTRAVENOUS at 06:00

## 2022-05-17 RX ADMIN — IOPAMIDOL 90 ML: 755 INJECTION, SOLUTION INTRAVENOUS at 02:54

## 2022-05-17 RX ADMIN — ENOXAPARIN SODIUM 40 MG: 100 INJECTION SUBCUTANEOUS at 08:55

## 2022-05-17 RX ADMIN — DILTIAZEM HYDROCHLORIDE 12.5 MG: 5 INJECTION INTRAVENOUS at 02:09

## 2022-05-17 RX ADMIN — BUDESONIDE AND FORMOTEROL FUMARATE DIHYDRATE 2 PUFF: 160; 4.5 AEROSOL RESPIRATORY (INHALATION) at 20:14

## 2022-05-17 RX ADMIN — METOCLOPRAMIDE HYDROCHLORIDE 5 MG: 5 TABLET ORAL at 20:15

## 2022-05-17 RX ADMIN — LISINOPRIL 40 MG: 20 TABLET ORAL at 08:54

## 2022-05-17 RX ADMIN — CLOPIDOGREL BISULFATE 75 MG: 75 TABLET ORAL at 08:54

## 2022-05-17 RX ADMIN — SODIUM CHLORIDE, PRESERVATIVE FREE 10 ML: 5 INJECTION INTRAVENOUS at 05:59

## 2022-05-17 RX ADMIN — BARICITINIB 2 MG: 2 TABLET, FILM COATED ORAL at 08:54

## 2022-05-17 RX ADMIN — DEXAMETHASONE SODIUM PHOSPHATE 8 MG: 10 INJECTION, SOLUTION INTRAMUSCULAR; INTRAVENOUS at 03:13

## 2022-05-17 RX ADMIN — ENOXAPARIN SODIUM 90 MG: 100 INJECTION SUBCUTANEOUS at 20:14

## 2022-05-17 RX ADMIN — ALBUTEROL SULFATE 2 PUFF: 90 AEROSOL, METERED RESPIRATORY (INHALATION) at 20:14

## 2022-05-17 RX ADMIN — PANTOPRAZOLE SODIUM 40 MG: 40 TABLET, DELAYED RELEASE ORAL at 08:54

## 2022-05-17 RX ADMIN — ALBUTEROL SULFATE 2 PUFF: 90 AEROSOL, METERED RESPIRATORY (INHALATION) at 16:17

## 2022-05-17 RX ADMIN — METOCLOPRAMIDE HYDROCHLORIDE 5 MG: 5 TABLET ORAL at 13:21

## 2022-05-17 RX ADMIN — SODIUM CHLORIDE, PRESERVATIVE FREE 10 ML: 5 INJECTION INTRAVENOUS at 08:58

## 2022-05-17 RX ADMIN — METOCLOPRAMIDE HYDROCHLORIDE 5 MG: 5 TABLET ORAL at 17:10

## 2022-05-17 RX ADMIN — ALBUTEROL SULFATE 2 PUFF: 90 AEROSOL, METERED RESPIRATORY (INHALATION) at 08:54

## 2022-05-17 RX ADMIN — BUDESONIDE AND FORMOTEROL FUMARATE DIHYDRATE 2 PUFF: 160; 4.5 AEROSOL RESPIRATORY (INHALATION) at 08:54

## 2022-05-17 RX ADMIN — SODIUM CHLORIDE, PRESERVATIVE FREE 10 ML: 5 INJECTION INTRAVENOUS at 20:14

## 2022-05-17 RX ADMIN — CARVEDILOL 12.5 MG: 12.5 TABLET, FILM COATED ORAL at 08:54

## 2022-05-17 RX ADMIN — METOCLOPRAMIDE HYDROCHLORIDE 5 MG: 5 TABLET ORAL at 08:54

## 2022-05-17 ASSESSMENT — ENCOUNTER SYMPTOMS
SHORTNESS OF BREATH: 1
BLOOD IN STOOL: 0
COUGH: 1
DIARRHEA: 0
CHEST TIGHTNESS: 1
WHEEZING: 0
ABDOMINAL PAIN: 0
VOMITING: 0
BACK PAIN: 0
ABDOMINAL DISTENTION: 0

## 2022-05-17 ASSESSMENT — PAIN SCALES - GENERAL: PAINLEVEL_OUTOF10: 0

## 2022-05-17 ASSESSMENT — PAIN - FUNCTIONAL ASSESSMENT: PAIN_FUNCTIONAL_ASSESSMENT: NONE - DENIES PAIN

## 2022-05-17 NOTE — ED NOTES
Patient placed on cardiac monitor, continuous pulse oximeter, and NIBP monitor. Monitor alarms on.          Kavya Siddiqui RN  05/17/22 [id number]

## 2022-05-17 NOTE — PLAN OF CARE
Patient admitted this a.m. Writer assumes care of patient this AM.  Patient seen and examined. Doing well. No new complaints. Shortness of breath improved as per patient. Laying comfortably in bed in no acute distress. Denies any acute complaints or distress at this time. Continue current management, including; baricitinib, dexamethasone, as well as  Adjuvant therapy. Cardiology consulted for A. fib with RVR.

## 2022-05-17 NOTE — PROGRESS NOTES
MEWS 4 noted.  - hospitalist is aware. IVF bolus infusing. No distress noted. CN also aware of pt condition.  Electronically signed by Anat Siu RN on 5/17/2022 at 6:07 AM

## 2022-05-17 NOTE — ED NOTES
RT at bedside obtaining ABG. Pt placed on Bipap 12/6 with 5 Liters. Pt tolerating well at this time.       Elyssa Potter RN  05/17/22 [id number]

## 2022-05-17 NOTE — CONSULTS
Palliative care note. Pt presented to ED with c/o CP and SOA. Pt was found to be COVID pos. Pt denies pain during our conversation. He only state that he felt \"hot and sweaty\" briefly but, feels better now. He also states he feels better today than yesterday. We discussed his AD. He tells me he does have one. Call placed to his dtr, Brandon Snider, she will provide if able to locate it it tonight. Should she not be able to find it, explained another could be completed. She confirms pt wishes to be a DNR. This RN has contacted MD for change in code status and will place once order is rec'd.   Electronically signed by Jaret Davey RN on 5/17/2022 at 11:02 AM

## 2022-05-17 NOTE — ED TRIAGE NOTES
Pt called EMS for chest pain and shortness of breath. Upon EMS arrival pt 02 sat was 82% RA. Pt was placed on NC and NRB without success. Pt was placed on CPAP and stated that breathing improved.

## 2022-05-17 NOTE — PROGRESS NOTES
Serge Santiago arrived to room # [id number]. Presented with: acute respiratory failure  Mental Status: Patient is oriented, alert and able to concentrate and follow conversation. Vitals:    05/17/22 0430   BP: 113/78   Pulse: 130   Resp: 20   Temp: 97 °F (36.1 °C)   SpO2: 96%     Patient safety contract and falls prevention contract reviewed with patient No.  Oriented Patient to room. Call light within reach. Yes. Needs, issues or concerns expressed at this time: no. MEWS 4 noted.     Electronically signed by Tish Giordano RN on 5/17/2022 at 5:04 AM

## 2022-05-17 NOTE — PROGRESS NOTES
Pharmacy Renal Adjustment    Kenan Salomon is a 77 y.o. male. Pharmacy has renally adjusted medications per protocol. Recent Labs     05/17/22  0206   BUN 14       Recent Labs     05/17/22  0206   CREATININE 1.5*       Estimated Creatinine Clearance: 48 mL/min (A) (based on SCr of 1.5 mg/dL (H)).     Height:   Ht Readings from Last 1 Encounters:   05/17/22 5' 10\" (1.778 m)     Weight:  Wt Readings from Last 1 Encounters:   05/17/22 200 lb 12.8 oz (91.1 kg)       Plan: Adjust the following medications based on renal function:           Metoclopramide 10 mg po four times daily to 5 mg po four times daily    Electronically signed by EVETTE Kulkarni [facility] on 5/17/2022 at 6:31 AM

## 2022-05-17 NOTE — H&P
Hospitalist: History and Physical    Date: 2022 Time: 3:27 AM    Name: Harish Arroyo : 1945 MRN: [id number]    Code Status: Full Code No additional code details    PCP: No primary care provider on file. Patient's Chief Complaint Is: Shortness of breath    HPI: Patient is a 77 y.o. male with cardiovascular disease, history of A. fib s/p cardioversion, history of heart block s/p pacemaker, presented to the ED with worsening shortness of breath with associated cough over the past 2 days, noting some chest tightness difficulty taking a deep breath. He was significantly tachypneic on arrival and hypoxic with SPO2 82% and screened positive for COVID-19. Due to his increased work of breathing he was placed on BiPAP. He appeared to initially be in SVT with heart rate up to 160, however after given dose of Cardizem his heart rate came down some and was in sinus tachycardia. Patient denies any palpitations. His tachypnea and work of breathing dramatically improved on BiPAP and was weaned to nasal cannula. Final results on chest x-ray and CTA chest pending.         Past Medical History:   Diagnosis Date    Atrial fibrillation ([address] 3/19/14    s/p cardioversion    CAD (coronary artery disease)     CHB (complete heart block) (HCC)     GERD (gastroesophageal reflux disease)     Hyperlipidemia     Hypertension     Ischemic dilated cardiomyopathy ([address]     Mitral regurgitation     Pacemaker 3/31/14     Past Surgical History:   Procedure Laterality Date    CARDIAC CATHETERIZATION  3/19/14  MDL    EF15-20%    CARDIAC VALVE REPLACEMENT  2014    Combined Mitral Valve Repair w/ 30 mm Bryant Annuloplasty Ring, CABG X 1 - SVG-OM, Torrez-MAZE Radiofrequency Ablation (JPO)    CARDIOVERSION  3/19/14  MDL    CORONARY ARTERY BYPASS GRAFT  2014    Combined Mitral Valve Repair w/ #30mm Bryant Annuloplasty Ring, CABG X 1 - SVG-OM, Torrez-MAZE Radiofrequency Ablation (JPO)    PACEMAKER [facility]  [facility]     Family History   Problem Relation Age of Onset    Heart Disease Other     Lung Cancer Father     Coronary Art Dis Brother     Coronary Art Dis Brother     High Blood Pressure Neg Hx     Cancer Neg Hx     Diabetes Neg Hx      Social History     Socioeconomic History    Marital status: Single     Spouse name: Not on file    Number of children: Not on file    Years of education: Not on file    Highest education level: Not on file   Occupational History    Not on file   Tobacco Use    Smoking status: Former Smoker     Types: Cigarettes    Smokeless tobacco: Never Used   Vaping Use    Vaping Use: Never used   Substance and Sexual Activity    Alcohol use: Yes     Comment: occ    Drug use: No    Sexual activity: Not on file   Other Topics Concern    Not on file   Social History Narrative    Not on file     Social Determinants of Health     Financial Resource Strain:     Difficulty of Paying Living Expenses: Not on file   Food Insecurity:     Worried About Running Out of Food in the Last Year: Not on file    Nancy of Food in the Last Year: Not on file   Transportation Needs:     Lack of Transportation (Medical): Not on file    Lack of Transportation (Non-Medical):  Not on file   Physical Activity:     Days of Exercise per Week: Not on file    Minutes of Exercise per Session: Not on file   Stress:     Feeling of Stress : Not on file   Social Connections:     Frequency of Communication with Friends and Family: Not on file    Frequency of Social Gatherings with Friends and Family: Not on file    Attends [employer] Services: Not on file    Active Member of Clubs or Organizations: Not on file    Attends Club or Organization Meetings: Not on file    Marital Status: Not on file   Intimate Partner Violence:     Fear of Current or Ex-Partner: Not on file    Emotionally Abused: Not on file    Physically Abused: Not on file    Sexually Abused: Not on file   Housing Stability:     Unable to Pay for Housing in the Last Year: Not on file    Number of Places Lived in the Last Year: Not on file    Unstable Housing in the Last Year: Not on file     No Known Allergies  Prior to Admission medications    Medication Sig Start Date End Date Taking? Authorizing Provider   pantoprazole (PROTONIX) 40 MG tablet Take 1 tablet by mouth daily 5/2/22   FLACO Conner   metoclopramide (REGLAN) 10 MG tablet Take 1 tablet by mouth 4 times daily 5/2/22   FLACO Conner   atorvastatin (LIPITOR) 20 MG tablet Take 1 tablet by mouth once daily 4/12/22   FLACO Salmon   carvedilol (COREG) 12.5 MG tablet 1 bid 4/12/22   FLACO Salmon   clopidogrel (PLAVIX) 75 MG tablet One daily 4/12/22   FLACO Salmon   lisinopril (PRINIVIL;ZESTRIL) 40 MG tablet Take 1 tablet by mouth daily 4/12/22   FLACO Salmon   sildenafil (VIAGRA) 100 MG tablet Take 1 tablet by mouth as needed for Erectile Dysfunction 8/16/17   FLACO Conner       I have reviewed all pertinent history. Prior medical records and laboratory evaluation reviewed. Imaging independently reviewed. Review of Systems:   As per HPI, otherwise all other ROS performed and found to be negative at this time. Physical Exam:  Vitals:    05/17/22 0223   BP: (!) 145/95   Pulse: 113   Resp: (!) 34   Temp:    SpO2: 97%     CONSTITUTIONAL: Fatigued appearing, no distress  PSYCH: Judgement and insight are normal. Mental status alert and oriented to person, place and time. Mood and affect are normal  EYES: JESSIE, symmetrical. Conjunctiva are moist, non-icteric. Lids are normal  ENT: Ears- No scars, lesions or masses. Hearing is grossly normal. Throat: Lips are normal. Oral mucosa is pink and moist.   NECK: Trachea is midline. Thyroid is non-tender.   Head: NC/AT  LUNGS: Mildly tachypneic, coarse breath sounds bilaterally, no wheezes, no accessory muscle use  CARDIOVASCULAR: Tachycardic with regular rhythm, pulses equal  GI/ABDOMEN: Soft, non-tender, non-distended, no guarding or rebound. Bowel sounds are normal.  SKIN: Warm and dry.    NEUROLOGICAL: Nonfocal, alert, follows commands, normal speech  EXTREMITIES: No clubbing, cyanosis or edema    Labs:   Recent Results (from the past 72 hour(s))   CBC with Auto Differential    Collection Time: 05/17/22  2:06 AM   Result Value Ref Range    WBC 11.5 (H) 4.8 - 10.8 K/uL    RBC 4.41 (L) 4.70 - 6.10 M/uL    Hemoglobin 13.7 (L) 14.0 - 18.0 g/dL    Hematocrit 43.5 42.0 - 52.0 %    MCV 98.6 (H) 80.0 - 94.0 fL    MCH 31.1 (H) 27.0 - 31.0 pg    MCHC 31.5 (L) 33.0 - 37.0 g/dL    RDW 13.4 11.5 - 14.5 %    Platelets 786 116 - 532 K/uL    MPV 9.8 9.4 - 12.4 fL    Neutrophils % 60.1 50.0 - 65.0 %    Lymphocytes % 29.6 20.0 - 40.0 %    Monocytes % 7.9 0.0 - 10.0 %    Eosinophils % 1.0 0.0 - 5.0 %    Basophils % 0.4 0.0 - 1.0 %    Neutrophils Absolute 6.9 1.5 - 7.5 K/uL    Immature Granulocytes # 0.1 K/uL    Lymphocytes Absolute 3.4 1.1 - 4.5 K/uL    Monocytes Absolute 0.90 0.00 - 0.90 K/uL    Eosinophils Absolute 0.10 0.00 - 0.60 K/uL    Basophils Absolute 0.10 0.00 - 0.20 K/uL   Comprehensive Metabolic Panel w/ Reflex to MG    Collection Time: 05/17/22  2:06 AM   Result Value Ref Range    Sodium 137 136 - 145 mmol/L    Potassium reflex Magnesium 4.1 3.5 - 5.0 mmol/L    Chloride 99 98 - 111 mmol/L    CO2 14 (L) 22 - 29 mmol/L    Anion Gap 24 (H) 7 - 19 mmol/L    Glucose 305 (H) 74 - 109 mg/dL    BUN 14 8 - 23 mg/dL    CREATININE 1.5 (H) 0.5 - 1.2 mg/dL    GFR Non- 46 (A) >60    GFR  55 (L) >59    Calcium 9.4 8.8 - 10.2 mg/dL    Total Protein 7.1 6.6 - 8.7 g/dL    Albumin 4.3 3.5 - 5.2 g/dL    Total Bilirubin 1.0 0.2 - 1.2 mg/dL    Alkaline Phosphatase 55 40 - 130 U/L    ALT 48 (H) 5 - 41 U/L    AST 65 (H) 5 - 40 U/L   Troponin    Collection Time: 05/17/22  2:06 AM   Result Value Ref Range    Troponin 0.03 0.00 - 0.03 ng/mL   Brain Natriuretic Peptide    Collection Time: 05/17/22  2:06 AM   Result Value Ref Range    Pro-BNP 1,141 0 - 1,800 pg/mL   Protime-INR    Collection Time: 05/17/22  2:06 AM   Result Value Ref Range    Protime 15.5 (H) 12.0 - 14.6 sec    INR 1.23 (H) 0.88 - 1.18   APTT    Collection Time: 05/17/22  2:06 AM   Result Value Ref Range    aPTT 28.8 26.0 - 36.2 sec   Respiratory Panel, Molecular, with COVID-19 (Restricted: peds pts or suitable admitted adults)    Collection Time: 05/17/22  2:06 AM    Specimen: Nasopharyngeal   Result Value Ref Range    Adenovirus by PCR Not Detected Not Detected    Bordetella parapertussis by PCR Not Detected Not Detected    Bordetella pertussis by PCR Not Detected Not Detected    Chlamydophilia pneumoniae by PCR Not Detected Not Detected    Coronavirus 229E by PCR Not Detected Not Detected    Coronavirus HKU1 by PCR Not Detected Not Detected    Coronavirus NL63 by PCR Not Detected Not Detected    Coronavirus OC43 by PCR Not Detected Not Detected    SARS-CoV-2, PCR DETECTED (AA) Not Detected    Human Metapneumovirus by PCR Not Detected Not Detected    Human Rhinovirus/Enterovirus by PCR Not Detected Not Detected    Influenza A by PCR Not Detected Not Detected    Influenza B by PCR Not Detected Not Detected    Mycoplasma pneumoniae by PCR Not Detected Not Detected    Parainfluenza Virus 1 by PCR Not Detected Not Detected    Parainfluenza Virus 2 by PCR Not Detected Not Detected    Parainfluenza Virus 3 by PCR Not Detected Not Detected    Parainfluenza Virus 4 by PCR Not Detected Not Detected    Respiratory Syncytial Virus by PCR Not Detected Not Detected   Lactic Acid    Collection Time: 05/17/22  2:06 AM   Result Value Ref Range    Lactic Acid 10.6 (HH) 0.5 - 1.9 mmol/L   D-Dimer, Quantitative    Collection Time: 05/17/22  2:06 AM   Result Value Ref Range    D-Dimer, Quant 1.45 (H) 0.00 - 0.48 ug/mL FEU   Acetone    Collection Time: 05/17/22  2:06 AM   Result Value Ref Range    Acetone, Bld Negative Negative   BLOOD GAS, ARTERIAL    Collection home meds including lisinopril and Coreg, monitor BP, adjust pending clinical course    DVT prophylaxis- lovenox    Leora Mike MD  5/17/2022 3:27 AM

## 2022-05-17 NOTE — CONSULTS
[id number] [facility]  Cardiology Consult      Requesting MD:  Ramsey Benavides MD   Admit Status:         History obtained from:   ? Patient  ? Other (specify):     PROBLEM LIST:    Patient Active Problem List    Diagnosis Date Noted    Acute hypoxemic respiratory failure due to COVID-19 ([address] 05/17/2022     Priority: Medium    Nonischemic cardiomyopathy ([address] 11/02/2021     Priority: Low     Overview Note:     Severe LV systolic dysfunction, ejection fraction 15-20% by cath, 2014      Mixed hyperlipidemia 08/16/2017     Priority: Low    CAD (coronary artery disease)      Priority: Low    Mitral regurgitation 04/22/2014     Priority: Low    Ischemic dilated cardiomyopathy ([address] 04/22/2014     Priority: Low    Essential hypertension      Priority: Low    CHB (complete heart block) (HCC)      Priority: Low    Pacemaker      Priority: Low    Paroxysmal atrial fibrillation with rapid ventricular response ([address] 03/19/2014     Priority: Low     Overview Note:     s/p cardioversion       1. Mixed ischemic/nonischemic dilated cardiomyopathy with prior severe mitral regurgitation status post mitral valve repair with ring annuloplasty, CABG x1 with SVG to OM, Torrez maze procedure March 2014, prior ejection fraction 20% (refusing repeat echocardiogram). 2.  Paroxysmal atrial fibrillation with prior cardioversion March 2014, status post pacemaker placement with no significant recurrence, not on anticoagulation. 3.  Hypertension. 4.  Acute COVID-pneumonia. PRESENTATION: Krysta Stauffer is a 77y.o. year old male who presents with acute onset of shortness of breath with severe coughing and chest tightness from shortness of breath noted to be severely hypoxic and found to be COVID-positive. Symptoms began 2 days after his return from [place]. Currently feeling much better. Heart rates had been markedly elevated with sinus tachycardia. No definitive atrial fibrillation noted. proBNP 1141. Ring, CABG X 1 - SVG-OM, Torrez-MAZE Radiofrequency Ablation (JPO)    PACEMAKER INSERTION  3/31/14  [facility]       Allergies:  Patient has no known allergies. Past Social History:  Social History     Socioeconomic History    Marital status: Single     Spouse name: Not on file    Number of children: Not on file    Years of education: Not on file    Highest education level: Not on file   Occupational History    Not on file   Tobacco Use    Smoking status: Former Smoker     Types: Cigarettes    Smokeless tobacco: Never Used   Vaping Use    Vaping Use: Never used   Substance and Sexual Activity    Alcohol use: Yes     Comment: occ    Drug use: No    Sexual activity: Not on file   Other Topics Concern    Not on file   Social History Narrative    Not on file     Social Determinants of Health     Financial Resource Strain:     Difficulty of Paying Living Expenses: Not on file   Food Insecurity:     Worried About [address] in the Last Year: Not on file    [address] in the Last Year: Not on file   Transportation Needs:     Lack of Transportation (Medical): Not on file    Lack of Transportation (Non-Medical):  Not on file   Physical Activity:     Days of Exercise per Week: Not on file    Minutes of Exercise per Session: Not on file   Stress:     Feeling of Stress : Not on file   Social Connections:     Frequency of Communication with Friends and Family: Not on file    Frequency of Social Gatherings with Friends and Family: Not on file    Attends Restoration Services: Not on file    Active Member of Clubs or Organizations: Not on file    Attends Club or Organization Meetings: Not on file    Marital Status: Not on file   Intimate Partner Violence:     Fear of Current or Ex-Partner: Not on file    Emotionally Abused: Not on file    Physically Abused: Not on file    Sexually Abused: Not on file   Housing Stability:     Unable to Pay for Housing in the Last Year: Not on file    Number of Places Lived in the Last Year: Not on file    Unstable Housing in the Last Year: Not on file       Family History:       Problem Relation Age of Onset    Heart Disease Other     Lung Cancer Father     Coronary Art Dis Brother     Coronary Art Dis Brother     High Blood Pressure Neg Hx     Cancer Neg Hx     Diabetes Neg Hx        Home Meds:  Prior to Admission medications    Medication Sig Start Date End Date Taking?  Authorizing Provider   pantoprazole (PROTONIX) 40 MG tablet Take 1 tablet by mouth daily 5/2/22   [facility] Crezully, FLACO   metoclopramide (REGLAN) 10 MG tablet Take 1 tablet by mouth 4 times daily 5/2/22   [facility] Crease, APRN   atorvastatin (LIPITOR) 20 MG tablet Take 1 tablet by mouth once daily 4/12/22   [facility] [place]   carvedilol (COREG) 12.5 MG tablet 1 bid 4/12/22   Son Jose, APRN   clopidogrel (PLAVIX) 75 MG tablet One daily 4/12/22   [facility]   lisinopril (PRINIVIL;ZESTRIL) 40 MG tablet Take 1 tablet by mouth daily 4/12/22   [facility]   sildenafil (VIAGRA) 100 MG tablet Take 1 tablet by mouth as needed for Erectile Dysfunction 8/16/17   FLACO [place]       Current Meds:   atorvastatin  20 mg Oral Daily    carvedilol  12.5 mg Oral BID     clopidogrel  75 mg Oral Daily    lisinopril  40 mg Oral Daily    pantoprazole  40 mg Oral Daily    sodium chloride flush  5-40 mL IntraVENous 2 times per day    dexamethasone  6 mg IntraVENous Q12H    albuterol sulfate HFA  2 puff Inhalation 4x daily    budesonide-formoterol  2 puff Inhalation BID    insulin lispro  0-12 Units SubCUTAneous TID WC    insulin lispro  0-6 Units SubCUTAneous Nightly    baricitinib  2 mg Oral Daily    metoclopramide  5 mg Oral 4x Daily    enoxaparin  1 mg/kg SubCUTAneous BID       Current Infused Meds:   sodium chloride         Physical Exam:  Vitals:    05/17/22 0854   BP: 128/83   Pulse: 130   Resp:    Temp:    SpO2:        Intake/Output Summary (Last 24 hours) at 5/17/2022 1541  Last data filed at 5/17/2022 0453  Gross per 24 hour   Intake --   Output 600 ml   Net -600 ml     Estimated body mass index is 28.81 kg/m² as calculated from the following:    Height as of this encounter: 5' 10\" (1.778 m). Weight as of this encounter: 200 lb 12.8 oz (91.1 kg). Physical Exam  Constitutional:       Appearance: He is obese. HENT:      Mouth/Throat:      Pharynx: No oropharyngeal exudate. Eyes:      General: No scleral icterus. Right eye: No discharge. Left eye: No discharge. Neck:      Thyroid: No thyromegaly. Vascular: No JVD. Cardiovascular:      Rate and Rhythm: Normal rate and regular rhythm. Heart sounds: No murmur heard. No friction rub. No gallop. Comments: No JVD  No edema  No obvious systolic or diastolic murmurs noted  Pulmonary:      Effort: No respiratory distress. Breath sounds: No stridor. Rales present. No wheezing. Comments: Rales  Abdominal:      General: Bowel sounds are normal. There is no distension. Palpations: Abdomen is soft. There is no mass. Tenderness: There is no abdominal tenderness. There is no guarding or rebound. Comments: No palpable organomegaly   Musculoskeletal:         General: No deformity. Skin:     General: Skin is warm. Coloration: Skin is not pale. Findings: No erythema or rash. Neurological:      Mental Status: He is alert and oriented to person, place, and time. Motor: No abnormal muscle tone.       Coordination: Coordination normal.      Deep Tendon Reflexes: Reflexes normal.           Labs:  Recent Labs     05/17/22  0206   WBC 11.5*   HGB 13.7*          Recent Labs     05/17/22  0206 05/17/22  0212     --    K 4.1 4.1   CL 99  --    CO2 14*  --    BUN 14  --    CREATININE 1.5*  --    LABGLOM 46*  --    CALCIUM 9.4  --        CK, CKMB, Troponin: @LABRCNT (CKTOTAL:3, CKMB:3, TROPONINI:3)@    Last 3 BNP:          IMAGING:  CT ABDOMEN PELVIS W IV CONTRAST Additional Contrast? None    Result Date: 5/17/2022  CT ABDOMEN PELVIS W IV CONTRAST 5/17/2022 7:57 AM History: Chest tightness and shortness of breath. Lactic acidosis. History of coronary artery disease and prior CABG. Initial O2 saturation = 82%. Tachycardia. Tachypnea. In order to have a CT radiation dose as low as reasonably achievable Automated Exposure Control was utilized for adjustment of the mA and/or KV according to patient size. DLP in mGycm= 1400. Abdomen/pelvis CT with IV contrast. Axial, sagittal, and coronal sequences. A preliminary [facility] report was transmitted to the appropriate department immediately after this study was interpreted. Heart size is normal. Bibasilar interstitial prominence with trace amounts of pleural fluid. Diffuse fatty liver. Tiny gallstones with no sign of cholecystitis or bile duct dilation. Normal pancreas. Normal spleen. Normal and symmetric adrenal glands and kidneys. No hydronephrosis or pyelonephritis. Aortic calcification with no aneurysm. No bowel dilation. No appendicitis. No diverticulitis or colitis. Large amount of fecal material within the rectum. 1. Tiny gallstones with no sign of cholecystitis and no biliary dilation. 2. Large amount of fecal material within the rectum. 3. No mass, fluid collection, or inflammatory process within the abdomen or pelvis. Signed by Dr Kameron Ingram    XR CHEST PORTABLE    Result Date: 5/17/2022  Examination. XR CHEST PORTABLE 5/17/2022 2:17 AM History: Shortness of breath. Frontal portable upright view of the chest is obtained and compared with the previous study dated 11/18/2016. The lungs are well-expanded. There is moderate pulmonary vascular congestion. There is no pleural effusion. No areas of focal consolidation. No pneumothorax. The heart size is not evaluated due to the portable projection. There is evidence of previous cardiac surgery and valvular replacement.  A dual-chamber cardiac pacer is in place. There is no acute bony abnormality. 1. Moderate pulmonary vascular congestion. Signed by Dr Meng Marie    Result Date: 5/17/2022  Examination. CTA PULMONARY W CONTRAST 5/17/2022 2:47 AM History: Shortness of breath and elevated d-dimer. Hypoxia. DLP: 674 mGycm. The automated exposure control was utilized to minimize the patient's radiation exposure. The CT angiography of the chest is performed after contrast enhancement. The images are acquired in axial plane and subsequent 2-D reconstruction in coronal and sagittal planes. There is no previous similar study for comparison. The correlation made with chest radiograph obtained earlier today. There is normal opacification of pulmonary arteries and branches bilaterally. No filling defects in the opacified pulmonary arterial bed. The RV/LV ratio is 49/56 which is normal. Atheromatous changes thoracic aorta are seen. No aneurysmal dilatation or dissection. Severe atheromatous changes of coronary arteries are seen. There is evidence of a prior CABG. A few nonspecific mediastinal and hilar lymph nodes are seen. The limited visualized soft tissues of the neck is unremarkable. The lungs are poorly expanded. There are extensive respiratory motion artifacts which limits the diagnostic yield of the study. There are interlobular septal thickening there are scattered groundglass opacity which may represent pulmonary congestion/pulmonary edema. There is a small bibasal pleural effusion. The visualized tracheobronchial structures are patent. The liver and spleen are suboptimally evaluated due to extensive streak artifacts. The gallbladder is incompletely visualized and not evaluated evaluated. The adrenal glands bilaterally are normal. The pancreas and kidneys are incompletely included in the study and not evaluated. The abdomen is separately reviewed and reported.  The images reviewed in bone window show chronic degenerative changes of the thoracic spine. No acute bony abnormalities noted. 1. No evidence of pulmonary embolism. No aortic aneurysm or dissection. Severe atheromatous changes of coronary arteries. Evidence of a prior CABG. 2. Pulmonary congestion or pulmonary edema. The lungs are suboptimally evaluated due to respiratory motion artifacts. 3. Small bibasal pleural effusion. Above study was initially reviewed and reported by [facility]. I do not find any discrepancies. Signed by Dr Amarjit Valdez and Plan: This is a 77y.o. year old male with past medical history of severe mixed cardiomyopathy with severe mitral regurgitation status post mitral valve repair with ring annuloplasty, one-vessel CABG with SVG to OM with Torrez-Maze procedure March 2014, paroxysmal atrial fibrillation with no recurrence since March 2014, prior ejection fraction of 20%, pacemaker placement, refusing echo follow-up, presenting with acute COVID-pneumonia with hypoxemia. 1.  No clear atrial fibrillation documented with EKG showing sinus tachycardia. Currently better rate controlled and symptomatically better. Continue management of COVID-pneumonia as appropriate. 2.  Still refusing to do an echocardiogram.  Unclear status EF. He does not wish any further interventions. 3.  Can follow-up as an outpatient with cardiology. Can reconsult if needed.     Electronically signed by Kaity Mann MD on 5/17/2022 at 3:41 PM

## 2022-05-17 NOTE — PROGRESS NOTES
4 Eyes Skin Assessment    Kenan Salomon is being assessed upon: Admission    I agree that Tori Brumfield, RN, along with Tiara Rivera (either 2 RN's or 1 LPN and 1 RN) have performed a thorough Head to Toe Skin Assessment on the patient. ALL assessment sites listed below have been assessed. Areas assessed by both nurses:     [x]   Head, Face, and Ears   [x]   Shoulders, Back, and Chest  [x]   Arms, Elbows, and Hands   [x]   Coccyx, Sacrum, and Ischium  [x]   Legs, Feet, and Heels    Does the Patient have Skin Breakdown?  No    Warren Prevention initiated: NA  Wound Care Orders initiated: NA    WOC nurse consulted for Pressure Injury (Stage 3,4, Unstageable, DTI, NWPT, and Complex wounds) and New or Established Ostomies: NA        Primary Nurse eSignature: Deyvi Muñiz RN on 5/17/2022 at 5:26 AM      Co-Signer eSignature: Electronically signed by Lucille Sheets RN on 5/17/22 at 5:27 AM CDT

## 2022-05-17 NOTE — PROGRESS NOTES
Contains critical data BLOOD GAS, ARTERIAL     Status: Final result    Component Ref Range & Units 05/17/22 0212   pH, Arterial 7.350 - 7.450 7.280 Low Panic     pCO2, Arterial 35.0 - 45.0 mmHg 24. 0 Low     pO2, Arterial 80.0 - 100.0 mmHg 107. 0 High     HCO3, Arterial 22.0 - 26.0 mmol/L 11.3 Low     Base Excess, Arterial -2.0 - 2.0 mmol/L -13.6 Low     Hemoglobin, Art, Extended 14.0 - 18.0 g/dL 13.7 Low     O2 Sat, Arterial >92 % 95.8    Carboxyhgb, Arterial 0.0 - 5.0 % 1.1    Comment:      0.0-1.5   (Smokers 1.5-5.0)    Methemoglobin, Arterial <1.5 % 0.4    O2 Content, Arterial Not Established mL/dL 18.6    O2 Therapy  Unknown    Potassium, Whole Blood  4.1    Resulting Agency  [facility]        Specimen Collected: 05/17/22 [id number] Last Resulted: 05/17/22 0216 View Other Order Details        BIPAP 12/6  With 5 l/m oxygen  LR site choice  Results given to Dr. Eitan Garcia

## 2022-05-17 NOTE — PROGRESS NOTES
Pharmacy Consult      Angy Lackey is a 77 y.o. male for whom pharmacy has been consulted to dose baricitinib. Patient Active Problem List   Diagnosis    PAF (paroxysmal atrial fibrillation) (HCC)    CHB (complete heart block) (HCC)    Pacemaker    Essential hypertension    Mitral regurgitation    Ischemic dilated cardiomyopathy (HCC)    CAD (coronary artery disease)    Mixed hyperlipidemia    Nonischemic cardiomyopathy ([facility]    Acute hypoxemic respiratory failure due to COVID-19 [facility])       Allergies:  Patient has no known allergies. Ht/Wt:   Ht Readings from Last 1 Encounters:   05/02/22 5' 10\" (1.778 m)        Wt Readings from Last 1 Encounters:   05/17/22 220 lb (99.8 kg)         Does the patient meet all of the criteria for receiving baricitinib (see below)? Yes. Has a daily CMP (or LFTs) and CBC with auto differential been ordered? Yes. If either are not ordered, the pharmacist should enter CMP and CBC with auto differential daily (per pharmacy practice) under the physician authorizing baricitinib. Recent Labs     05/17/22  0206   LABGLOM 46*   GFRAA 55*   LYMPHSABS 3.4   NEUTROABS 6.9   ALT 48*   AST 65*           Assessment/Plan:    Start patient on baricitinib 2 mg daily for 14 days of total treatment or until hospital discharge, whichever is first. Pharmacy will continue to follow GFR, ALC, ANC and aminotransferases. Thank you for the consult.      Electronically signed by Fartun Rojas, [address] [place] [address] on 5/17/2022 at 3:42 AM

## 2022-05-17 NOTE — PROGRESS NOTES
Physician Progress Note      PATIENT:               Casie Yeung  CSN #:                  [id number]  :                       1945  ADMIT DATE:       2022 2:02 AM  DISCH DATE:  RESPONDING  PROVIDER #:        Baldev Siegel MD          QUERY TEXT:    Pt admitted with COVID-19 and noted to have elevated heart rate, elevated   resp. rate, elevated CRP, elevated Procalcitonin. If possible, please   document in progress notes and discharge summary if you are evaluating and/or   treating: The medical record reflects the following:  Risk Factors: COVID-19  Clinical Indicators: Acute Resp. Failure with hypoxia, heart rate: 158 and RR:   51 on arrival and SPo2: 93%, WBC: 11.5, PROCAL: 0.12, CRP: 3.12, Lactic 10.6  Treatment: Decadron, Baricitinib, BIPAP,    Thank you in advance,    Murtaza Quintanilla, RN-BSN, [facility]  Clinical   [facility]  [place], [email or web address]  Options provided:  -- Sepsis present on admission due to COVID-19 infection  -- Sepsis not present on admission due to COVID-19 infection  -- Sepsis present on admission due to COVID-19 pneumonia  -- Sepsis not present on admission due to COVID-19 pneumonia  -- Covid-19 infection without sepsis  -- Covid-19 pneumonia without sepsis  -- Other - I will add my own diagnosis  -- Disagree - Not applicable / Not valid  -- Disagree - Clinically unable to determine / Unknown  -- Refer to Clinical Documentation Reviewer    PROVIDER RESPONSE TEXT:    This patient has sepsis which was present on admission due to COVID-19   infection.     Query created by: Lacey Packer on 2022 12:09 PM      Electronically signed by:  Baldev Siegel MD 2022 2:36 PM

## 2022-05-17 NOTE — CARE COORDINATION
Attempted to reach pt via room phone to discuss dc plans/needs. No answer, will try again later.    Electronically signed by Alexa Gracia on 5/17/2022 at 8:38 AM

## 2022-05-17 NOTE — ACP (ADVANCE CARE PLANNING)
or more involved healthcare providers          Dtr will provide AD as soon as she is able to locate. SH does confirm pt wishes for DNR.     Electronically signed by Rima Sheth RN on 5/17/2022 at 11:04 AM

## 2022-05-17 NOTE — ED PROVIDER NOTES
(complete heart block) (HCC)     GERD (gastroesophageal reflux disease)     Hyperlipidemia     Hypertension     Ischemic dilated cardiomyopathy ([address]     Mitral regurgitation     Pacemaker 3/31/14         SURGICAL HISTORY       Past Surgical History:   Procedure Laterality Date    CARDIAC CATHETERIZATION  3/19/14  [facility]    EF15-20%    CARDIAC VALVE REPLACEMENT  03/24/2014    Combined Mitral Valve Repair w/ 30 mm Bryant Annuloplasty Ring, CABG X 1 - SVG-OM, Torrez-MAZE Radiofrequency Ablation (JPO)    CARDIOVERSION  3/19/14  JOSEFINA    CORONARY ARTERY BYPASS GRAFT  03/24/2014    Combined Mitral Valve Repair w/ #30mm Bryant Annuloplasty Ring, CABG X 1 - SVG-OM, Torrez-MAZE Radiofrequency Ablation (JPO)    PACEMAKER INSERTION  3/31/14  [employer]         CURRENT MEDICATIONS       Current Discharge Medication List      CONTINUE these medications which have NOT CHANGED    Details   pantoprazole (PROTONIX) 40 MG tablet Take 1 tablet by mouth daily  Qty: 90 tablet, Refills: 3      metoclopramide (REGLAN) 10 MG tablet Take 1 tablet by mouth 4 times daily  Qty: 360 tablet, Refills: 3      atorvastatin (LIPITOR) 20 MG tablet Take 1 tablet by mouth once daily  Qty: 90 tablet, Refills: 3    Associated Diagnoses: Hyperlipidemia, unspecified hyperlipidemia type      carvedilol (COREG) 12.5 MG tablet 1 bid  Qty: 180 tablet, Refills: 3      clopidogrel (PLAVIX) 75 MG tablet One daily  Qty: 90 tablet, Refills: 3      lisinopril (PRINIVIL;ZESTRIL) 40 MG tablet Take 1 tablet by mouth daily  Qty: 90 tablet, Refills: 3      sildenafil (VIAGRA) 100 MG tablet Take 1 tablet by mouth as needed for Erectile Dysfunction  Qty: 16 tablet, Refills: 2             ALLERGIES     Patient has no known allergies.     FAMILY HISTORY       Family History   Problem Relation Age of Onset    Heart Disease Other     Lung Cancer Father     Coronary Art Dis Brother     Coronary Art Dis Brother     High Blood Pressure Neg Hx     Cancer Neg Hx     Diabetes Neg Hx           SOCIAL HISTORY       Social History     Socioeconomic History    Marital status: Single     Spouse name: None    Number of children: None    Years of education: None    Highest education level: None   Occupational History    None   Tobacco Use    Smoking status: Former Smoker     Types: Cigarettes    Smokeless tobacco: Never Used   Vaping Use    Vaping Use: Never used   Substance and Sexual Activity    Alcohol use: Yes     Comment: occ    Drug use: No    Sexual activity: None   Other Topics Concern    None   Social History Narrative    None     Social Determinants of Health     Financial Resource Strain:     Difficulty of Paying Living Expenses: Not on file   Food Insecurity:     Worried About Running Out of Food in the Last Year: Not on file    Nancy of Food in the Last Year: Not on file   Transportation Needs:     Lack of Transportation (Medical): Not on file    Lack of Transportation (Non-Medical):  Not on file   Physical Activity:     Days of Exercise per Week: Not on file    Minutes of Exercise per Session: Not on file   Stress:     Feeling of Stress : Not on file   Social Connections:     Frequency of Communication with Friends and Family: Not on file    Frequency of Social Gatherings with Friends and Family: Not on file    Attends Taoist Services: Not on file    Active Member of [address] [place] [address] [employer] or Organizations: Not on file    Attends Club or Organization Meetings: Not on file    Marital Status: Not on file   Intimate Partner Violence:     Fear of Current or Ex-Partner: Not on file    Emotionally Abused: Not on file    Physically Abused: Not on file    Sexually Abused: Not on file   Housing Stability:     Unable to Pay for Housing in the Last Year: Not on file    Number of [place] in the Last Year: Not on file    Unstable Housing in the Last Year: Not on file       SCREENINGS    Teo Coma Scale  Eye Opening: Spontaneous  Best Verbal Response: Oriented  Best Motor Response: Obeys commands  Teo Coma Scale Score: 15        PHYSICAL EXAM    (up to 7 for level 4, 8 or more for level 5)     ED Triage Vitals   BP Temp Temp Source Pulse Resp SpO2 Height Weight   -- 05/17/22 0206 05/17/22 0206 05/17/22 0203 05/17/22 0203 05/17/22 0203 -- 05/17/22 0203    98.2 °F (36.8 °C) Axillary 158 (!) 51 93 %  220 lb (99.8 kg)       Physical Exam  Vitals and nursing note reviewed. Constitutional:       General: He is in acute distress. Appearance: He is well-developed. He is ill-appearing and diaphoretic. HENT:      Head: Normocephalic and atraumatic. Eyes:      General: No scleral icterus. Neck:      Vascular: No JVD. Cardiovascular:      Rate and Rhythm: Normal rate and regular rhythm. Pulses:           Radial pulses are 2+ on the right side and 2+ on the left side. Dorsalis pedis pulses are 2+ on the right side and 2+ on the left side. Heart sounds: Normal heart sounds. No murmur heard. No friction rub. No gallop. Pulmonary:      Effort: Tachypnea, accessory muscle usage and respiratory distress present. Breath sounds: Normal breath sounds. Decreased air movement present. No stridor. No decreased breath sounds or wheezing. Chest:      Chest wall: No tenderness. Abdominal:      General: There is no distension. Palpations: Abdomen is soft. Tenderness: There is no abdominal tenderness. There is no guarding or rebound. Musculoskeletal:         General: No deformity. Normal range of motion. Right lower leg: No edema. Left lower leg: No edema. Skin:     General: Skin is cool. Coloration: Skin is pale. Findings: No erythema. Neurological:      Mental Status: He is alert and oriented to person, place, and time. GCS: GCS eye subscore is 4. GCS verbal subscore is 5. GCS motor subscore is 6. Cranial Nerves: No cranial nerve deficit. Motor: No abnormal muscle tone.       Coordination: Coordination normal.   Psychiatric:         Behavior: Behavior normal.         Judgment: Judgment normal.         DIAGNOSTIC RESULTS     EKG: All EKG's are interpreted by the Emergency Department Physician who either signs or Co-signs this chart in the absence of a cardiologist.        RADIOLOGY:   Non-plain film images such as CT, Ultrasound and MRI are read by the radiologist. Plainradiographic images are visualized and preliminarily interpreted by the emergency physician with the below findings:  CT ABDOMEN & PELVIS With Contrast:  Interlobular septal thickening in the bilateral baseswith trace bilateral pleural effusions consistent  with pulmonaryedema. Hepatic steatosis. No obstructing urinarystones or hydronephrosis. No significant bladder wall thickening. No evidence of colitis or bowel obstruction. Normal appendix. Large inspissated stool burden  throughout the rectumconsistent with constipation. Tinygallstones in the gallbladder fundus. No gallbladder distention, biliaryductal dilation, or evidence  of pancreatitis. Radiologist: Josue Rojas M.D. Interpretation per the Radiologist below, if available at the time of this note:    XR CHEST PORTABLE   Final Result   1. Moderate pulmonary vascular congestion.    Signed by Dr Rosanne Kiran    (Results Pending)   CT ABDOMEN PELVIS W IV CONTRAST Additional Contrast? None    (Results Pending)         ED BEDSIDE ULTRASOUND:   Performed by ED Physician - none    LABS:  Labs Reviewed   RESPIRATORY PANEL, MOLECULAR, WITH COVID-19 - Abnormal; Notable for the following components:       Result Value    SARS-CoV-2, PCR DETECTED (*)     All other components within normal limits    Narrative:     Asif Brar tel. ,  Microbiology results called to and read back by Tereasa Angelucci RN/ER,  05/17/2022 03:02, by BARBER   CBC WITH AUTO DIFFERENTIAL - Abnormal; Notable for the following components:    WBC 11.5 (*)     RBC 4.41 (*)     Hemoglobin 13.7 (*)     MCV 98.6 (*)     MCH 31.1 (*)     MCHC 31.5 (*)     All other components within normal limits   COMPREHENSIVE METABOLIC PANEL W/ REFLEX TO MG FOR LOW K - Abnormal; Notable for the following components:    CO2 14 (*)     Anion Gap 24 (*)     Glucose 305 (*)     CREATININE 1.5 (*)     GFR Non- 46 (*)     GFR  55 (*)     ALT 48 (*)     AST 65 (*)     All other components within normal limits   PROTIME-INR - Abnormal; Notable for the following components:    Protime 15.5 (*)     INR 1.23 (*)     All other components within normal limits   BLOOD GAS, ARTERIAL - Abnormal; Notable for the following components:    pH, Arterial 7.280 (*)     pCO2, Arterial 24.0 (*)     pO2, Arterial 107.0 (*)     HCO3, Arterial 11.3 (*)     Base Excess, Arterial -13.6 (*)     Hemoglobin, Art, Extended 13.7 (*)     All other components within normal limits   LACTIC ACID - Abnormal; Notable for the following components:    Lactic Acid 10.6 (*)     All other components within normal limits    Narrative:     CALL  Vitaly JENNINGS tel. ,  Chemistry results called to and read back by Constantin Swan in ED, 05/17/2022  02:48, by MISSY   D-DIMER, QUANTITATIVE - Abnormal; Notable for the following components:    D-Dimer, Quant 1.45 (*)     All other components within normal limits   URINALYSIS WITH REFLEX TO CULTURE - Abnormal; Notable for the following components:    Glucose, Ur 100 (*)     Ketones, Urine TRACE (*)     All other components within normal limits   PROCALCITONIN - Abnormal; Notable for the following components:    Procalcitonin 0.12 (*)     All other components within normal limits   C-REACTIVE PROTEIN - Abnormal; Notable for the following components:    CRP 3.12 (*)     All other components within normal limits   LACTATE DEHYDROGENASE - Abnormal; Notable for the following components:     (*)     All other components within normal limits   FERRITIN - Abnormal; Notable for the following components: metabolic acidosis with elevated anion gap. Patient is hyperglycemic. Possibly represents DKA versus lactic acidosis. [KANWAL]   [id number] Patient has had significant improvement in respiratory status since initiating BiPAP. CTA of the chest with CT abdomen pelvis ordered to evaluate for etiology of lactic acidosis. Meanwhile COVID has come back positive which explains respiratory distress and hypoxia. Lactic acidosis may be just due to significant respiratory distress with accessory muscle usage. [KANWAL]   0315 CTACHEST:  1. No pulmonaryembolism. 2. Mild pulmonaryedema and pleural effusion. Radiologist: Evan Jenkins MD [KANWAL]      ED Course User Index  [KANWAL] Martha Barakat MD     Based on the evaluation and work-up here patient is felt to require further monitoring, work-up, or treatment that is available in the emergency department. Case was discussed with hospitalist who agrees for observation or admission for further management. Treatment and stabilization as necessary were provided in the emergency department prior to transfer of care to the medicine service. CONSULTS:  IP CONSULT TO PHARMACY    PROCEDURES:  Unless otherwise notedbelow, none     Procedures  CRITICAL CARE TIME   Total Critical Care time was 80 minutes, excluding separately reportable procedures. There was a high probability of clinically significant/life threatening deterioration in the patient's condition which required my urgent intervention. FINAL IMPRESSION     1. Acute respiratory failure with hypoxemia (HCC)    2. Lactic acidosis    3. Paroxysmal supraventricular tachycardia ([address]    4. COVID-19          DISPOSITION/PLAN   DISPOSITION Admitted 05/17/2022 03:22:00 AM      PATIENT REFERRED TO:  No follow-up provider specified.     DISCHARGE MEDICATIONS:  Current Discharge Medication List             (Please note that portions of this note were completed with a voice recognition program.  Efforts were made to edit the dictations butoccasionally words are mis-transcribed.)    Latosha Stewart MD (electronically signed)  Emergency Physician          Latsoha Stewart., MD  05/17/22 [id number]

## 2022-05-17 NOTE — PROGRESS NOTES
12hr chart check Electronically signed by Ani Romero RN on 5/17/2022 at 5:54 AM O-L Flap Text: The defect edges were debeveled with a #15 scalpel blade.  Given the location of the defect, shape of the defect and the proximity to free margins an O-L flap was deemed most appropriate.  Using a sterile surgical marker, an appropriate advancement flap was drawn incorporating the defect and placing the expected incisions within the relaxed skin tension lines where possible.    The area thus outlined was incised deep to adipose tissue with a #15 scalpel blade.  The skin margins were undermined to an appropriate distance in all directions utilizing iris scissors.

## 2022-05-18 PROBLEM — E11.65 TYPE 2 DIABETES MELLITUS WITH HYPERGLYCEMIA (HCC): Status: ACTIVE | Noted: 2022-05-18

## 2022-05-18 LAB
ALBUMIN SERPL-MCNC: 3.5 G/DL (ref 3.5–5.2)
ALP BLD-CCNC: 39 U/L (ref 40–130)
ALT SERPL-CCNC: 52 U/L (ref 5–41)
ANION GAP SERPL CALCULATED.3IONS-SCNC: 13 MMOL/L (ref 7–19)
AST SERPL-CCNC: 56 U/L (ref 5–40)
BASOPHILS ABSOLUTE: 0 K/UL (ref 0–0.2)
BASOPHILS RELATIVE PERCENT: 0.1 % (ref 0–1)
BILIRUB SERPL-MCNC: 0.5 MG/DL (ref 0.2–1.2)
BUN BLDV-MCNC: 29 MG/DL (ref 8–23)
C-REACTIVE PROTEIN: 4.44 MG/DL (ref 0–0.5)
CALCIUM SERPL-MCNC: 9.1 MG/DL (ref 8.8–10.2)
CHLORIDE BLD-SCNC: 104 MMOL/L (ref 98–111)
CO2: 19 MMOL/L (ref 22–29)
CREAT SERPL-MCNC: 1.1 MG/DL (ref 0.5–1.2)
EOSINOPHILS ABSOLUTE: 0 K/UL (ref 0–0.6)
EOSINOPHILS RELATIVE PERCENT: 0 % (ref 0–5)
FIBRINOGEN: 496 MG/DL (ref 238–505)
GFR AFRICAN AMERICAN: >59
GFR NON-AFRICAN AMERICAN: >60
GLUCOSE BLD-MCNC: 173 MG/DL (ref 70–99)
GLUCOSE BLD-MCNC: 197 MG/DL (ref 70–99)
GLUCOSE BLD-MCNC: 204 MG/DL (ref 74–109)
GLUCOSE BLD-MCNC: 215 MG/DL (ref 70–99)
GLUCOSE BLD-MCNC: 270 MG/DL (ref 70–99)
HCT VFR BLD CALC: 36.2 % (ref 42–52)
HEMOGLOBIN: 12 G/DL (ref 14–18)
IMMATURE GRANULOCYTES #: 0.1 K/UL
LACTIC ACID: 2.3 MMOL/L (ref 0.5–1.9)
LACTIC ACID: 4.2 MMOL/L (ref 0.5–1.9)
LYMPHOCYTES ABSOLUTE: 1.2 K/UL (ref 1.1–4.5)
LYMPHOCYTES RELATIVE PERCENT: 11 % (ref 20–40)
MCH RBC QN AUTO: 31.3 PG (ref 27–31)
MCHC RBC AUTO-ENTMCNC: 33.1 G/DL (ref 33–37)
MCV RBC AUTO: 94.3 FL (ref 80–94)
MONOCYTES ABSOLUTE: 0.5 K/UL (ref 0–0.9)
MONOCYTES RELATIVE PERCENT: 4.8 % (ref 0–10)
NEUTROPHILS ABSOLUTE: 9.2 K/UL (ref 1.5–7.5)
NEUTROPHILS RELATIVE PERCENT: 83.6 % (ref 50–65)
PDW BLD-RTO: 13.3 % (ref 11.5–14.5)
PERFORMED ON: ABNORMAL
PLATELET # BLD: 207 K/UL (ref 130–400)
PMV BLD AUTO: 10.5 FL (ref 9.4–12.4)
POTASSIUM REFLEX MAGNESIUM: 4 MMOL/L (ref 3.5–5)
PROCALCITONIN: 7.85 NG/ML (ref 0–0.09)
RBC # BLD: 3.84 M/UL (ref 4.7–6.1)
SODIUM BLD-SCNC: 136 MMOL/L (ref 136–145)
TOTAL PROTEIN: 6.7 G/DL (ref 6.6–8.7)
VITAMIN D 25-HYDROXY: 19.8 NG/ML
WBC # BLD: 11.1 K/UL (ref 4.8–10.8)

## 2022-05-18 PROCEDURE — 86140 C-REACTIVE PROTEIN: CPT

## 2022-05-18 PROCEDURE — 6370000000 HC RX 637 (ALT 250 FOR IP): Performed by: STUDENT IN AN ORGANIZED HEALTH CARE EDUCATION/TRAINING PROGRAM

## 2022-05-18 PROCEDURE — 87150 DNA/RNA AMPLIFIED PROBE: CPT

## 2022-05-18 PROCEDURE — 94761 N-INVAS EAR/PLS OXIMETRY MLT: CPT

## 2022-05-18 PROCEDURE — 97161 PT EVAL LOW COMPLEX 20 MIN: CPT

## 2022-05-18 PROCEDURE — 6360000002 HC RX W HCPCS: Performed by: INTERNAL MEDICINE

## 2022-05-18 PROCEDURE — 85384 FIBRINOGEN ACTIVITY: CPT

## 2022-05-18 PROCEDURE — 2580000003 HC RX 258: Performed by: INTERNAL MEDICINE

## 2022-05-18 PROCEDURE — 80053 COMPREHEN METABOLIC PANEL: CPT

## 2022-05-18 PROCEDURE — 6360000002 HC RX W HCPCS: Performed by: STUDENT IN AN ORGANIZED HEALTH CARE EDUCATION/TRAINING PROGRAM

## 2022-05-18 PROCEDURE — 84145 PROCALCITONIN (PCT): CPT

## 2022-05-18 PROCEDURE — 82947 ASSAY GLUCOSE BLOOD QUANT: CPT

## 2022-05-18 PROCEDURE — 2580000003 HC RX 258: Performed by: STUDENT IN AN ORGANIZED HEALTH CARE EDUCATION/TRAINING PROGRAM

## 2022-05-18 PROCEDURE — 1210000000 HC MED SURG R&B

## 2022-05-18 PROCEDURE — 36415 COLL VENOUS BLD VENIPUNCTURE: CPT

## 2022-05-18 PROCEDURE — 6370000000 HC RX 637 (ALT 250 FOR IP): Performed by: INTERNAL MEDICINE

## 2022-05-18 PROCEDURE — 97530 THERAPEUTIC ACTIVITIES: CPT

## 2022-05-18 PROCEDURE — 97165 OT EVAL LOW COMPLEX 30 MIN: CPT

## 2022-05-18 PROCEDURE — 82306 VITAMIN D 25 HYDROXY: CPT

## 2022-05-18 PROCEDURE — 87040 BLOOD CULTURE FOR BACTERIA: CPT

## 2022-05-18 PROCEDURE — 85025 COMPLETE CBC W/AUTO DIFF WBC: CPT

## 2022-05-18 PROCEDURE — 83605 ASSAY OF LACTIC ACID: CPT

## 2022-05-18 RX ORDER — DEXTROSE MONOHYDRATE 50 MG/ML
100 INJECTION, SOLUTION INTRAVENOUS PRN
Status: DISCONTINUED | OUTPATIENT
Start: 2022-05-18 | End: 2022-05-20 | Stop reason: HOSPADM

## 2022-05-18 RX ORDER — ERGOCALCIFEROL 1.25 MG/1
50000 CAPSULE ORAL WEEKLY
Status: DISCONTINUED | OUTPATIENT
Start: 2022-05-18 | End: 2022-05-20 | Stop reason: HOSPADM

## 2022-05-18 RX ORDER — INSULIN GLARGINE 100 [IU]/ML
25 INJECTION, SOLUTION SUBCUTANEOUS NIGHTLY
Status: DISCONTINUED | OUTPATIENT
Start: 2022-05-18 | End: 2022-05-20 | Stop reason: HOSPADM

## 2022-05-18 RX ORDER — METOCLOPRAMIDE 10 MG/1
10 TABLET ORAL 4 TIMES DAILY
Status: DISCONTINUED | OUTPATIENT
Start: 2022-05-18 | End: 2022-05-18 | Stop reason: SINTOL

## 2022-05-18 RX ORDER — INSULIN LISPRO 100 [IU]/ML
6 INJECTION, SOLUTION INTRAVENOUS; SUBCUTANEOUS
Status: DISCONTINUED | OUTPATIENT
Start: 2022-05-18 | End: 2022-05-20 | Stop reason: HOSPADM

## 2022-05-18 RX ORDER — ASCORBIC ACID 500 MG
1000 TABLET ORAL DAILY
Status: DISCONTINUED | OUTPATIENT
Start: 2022-05-18 | End: 2022-05-20 | Stop reason: HOSPADM

## 2022-05-18 RX ORDER — ZINC SULFATE 50(220)MG
50 CAPSULE ORAL DAILY
Status: DISCONTINUED | OUTPATIENT
Start: 2022-05-18 | End: 2022-05-20 | Stop reason: HOSPADM

## 2022-05-18 RX ORDER — CHLORPROMAZINE HYDROCHLORIDE 25 MG/1
25 TABLET, FILM COATED ORAL 3 TIMES DAILY
Qty: 15 TABLET | Refills: 0 | Status: SHIPPED | OUTPATIENT
Start: 2022-05-18 | End: 2022-05-23

## 2022-05-18 RX ORDER — SODIUM CHLORIDE 9 MG/ML
INJECTION, SOLUTION INTRAVENOUS CONTINUOUS
Status: DISCONTINUED | OUTPATIENT
Start: 2022-05-18 | End: 2022-05-20 | Stop reason: HOSPADM

## 2022-05-18 RX ORDER — CHLORPROMAZINE HYDROCHLORIDE 25 MG/1
25 TABLET, FILM COATED ORAL 3 TIMES DAILY
Status: DISCONTINUED | OUTPATIENT
Start: 2022-05-18 | End: 2022-05-20 | Stop reason: HOSPADM

## 2022-05-18 RX ORDER — DEXAMETHASONE SODIUM PHOSPHATE 10 MG/ML
6 INJECTION, SOLUTION INTRAMUSCULAR; INTRAVENOUS DAILY
Status: DISCONTINUED | OUTPATIENT
Start: 2022-05-19 | End: 2022-05-20 | Stop reason: HOSPADM

## 2022-05-18 RX ORDER — INSULIN LISPRO 100 [IU]/ML
0-6 INJECTION, SOLUTION INTRAVENOUS; SUBCUTANEOUS NIGHTLY
Status: DISCONTINUED | OUTPATIENT
Start: 2022-05-18 | End: 2022-05-18

## 2022-05-18 RX ORDER — INSULIN LISPRO 100 [IU]/ML
0-12 INJECTION, SOLUTION INTRAVENOUS; SUBCUTANEOUS
Status: DISCONTINUED | OUTPATIENT
Start: 2022-05-18 | End: 2022-05-18

## 2022-05-18 RX ORDER — CHLORPROMAZINE HYDROCHLORIDE 10 MG/1
10 TABLET, FILM COATED ORAL 3 TIMES DAILY
Status: DISCONTINUED | OUTPATIENT
Start: 2022-05-18 | End: 2022-05-18

## 2022-05-18 RX ADMIN — DEXAMETHASONE SODIUM PHOSPHATE 6 MG: 10 INJECTION INTRAMUSCULAR; INTRAVENOUS at 05:45

## 2022-05-18 RX ADMIN — ENOXAPARIN SODIUM 90 MG: 100 INJECTION SUBCUTANEOUS at 07:54

## 2022-05-18 RX ADMIN — CLOPIDOGREL BISULFATE 75 MG: 75 TABLET ORAL at 07:53

## 2022-05-18 RX ADMIN — VANCOMYCIN HYDROCHLORIDE 2000 MG: 5 INJECTION, POWDER, LYOPHILIZED, FOR SOLUTION INTRAVENOUS at 17:51

## 2022-05-18 RX ADMIN — INSULIN LISPRO 6 UNITS: 100 INJECTION, SOLUTION INTRAVENOUS; SUBCUTANEOUS at 17:10

## 2022-05-18 RX ADMIN — BARICITINIB 4 MG: 2 TABLET, FILM COATED ORAL at 07:52

## 2022-05-18 RX ADMIN — METOCLOPRAMIDE 10 MG: 10 TABLET ORAL at 07:53

## 2022-05-18 RX ADMIN — ZINC SULFATE 220 MG (50 MG) CAPSULE 50 MG: CAPSULE at 12:49

## 2022-05-18 RX ADMIN — INSULIN LISPRO 3 UNITS: 100 INJECTION, SOLUTION INTRAVENOUS; SUBCUTANEOUS at 21:39

## 2022-05-18 RX ADMIN — CARVEDILOL 12.5 MG: 12.5 TABLET, FILM COATED ORAL at 07:53

## 2022-05-18 RX ADMIN — SODIUM CHLORIDE: 9 INJECTION, SOLUTION INTRAVENOUS at 17:02

## 2022-05-18 RX ADMIN — LISINOPRIL 40 MG: 20 TABLET ORAL at 07:53

## 2022-05-18 RX ADMIN — BUDESONIDE AND FORMOTEROL FUMARATE DIHYDRATE 2 PUFF: 160; 4.5 AEROSOL RESPIRATORY (INHALATION) at 21:36

## 2022-05-18 RX ADMIN — SODIUM CHLORIDE, PRESERVATIVE FREE 2000 MG: 5 INJECTION INTRAVENOUS at 17:02

## 2022-05-18 RX ADMIN — ALBUTEROL SULFATE 2 PUFF: 90 AEROSOL, METERED RESPIRATORY (INHALATION) at 07:54

## 2022-05-18 RX ADMIN — ALBUTEROL SULFATE 2 PUFF: 90 AEROSOL, METERED RESPIRATORY (INHALATION) at 21:37

## 2022-05-18 RX ADMIN — BUDESONIDE AND FORMOTEROL FUMARATE DIHYDRATE 2 PUFF: 160; 4.5 AEROSOL RESPIRATORY (INHALATION) at 07:54

## 2022-05-18 RX ADMIN — INSULIN GLARGINE 25 UNITS: 100 INJECTION, SOLUTION SUBCUTANEOUS at 21:15

## 2022-05-18 RX ADMIN — INSULIN LISPRO 4 UNITS: 100 INJECTION, SOLUTION INTRAVENOUS; SUBCUTANEOUS at 17:10

## 2022-05-18 RX ADMIN — ALBUTEROL SULFATE 2 PUFF: 90 AEROSOL, METERED RESPIRATORY (INHALATION) at 11:58

## 2022-05-18 RX ADMIN — CHLORPROMAZINE HYDROCHLORIDE 25 MG: 25 TABLET, SUGAR COATED ORAL at 21:38

## 2022-05-18 RX ADMIN — CARVEDILOL 12.5 MG: 12.5 TABLET, FILM COATED ORAL at 16:50

## 2022-05-18 RX ADMIN — OXYCODONE HYDROCHLORIDE AND ACETAMINOPHEN 1000 MG: 500 TABLET ORAL at 12:49

## 2022-05-18 RX ADMIN — INSULIN LISPRO 2 UNITS: 100 INJECTION, SOLUTION INTRAVENOUS; SUBCUTANEOUS at 12:07

## 2022-05-18 RX ADMIN — INSULIN LISPRO 2 UNITS: 100 INJECTION, SOLUTION INTRAVENOUS; SUBCUTANEOUS at 08:33

## 2022-05-18 RX ADMIN — ERGOCALCIFEROL 50000 UNITS: 1.25 CAPSULE ORAL at 12:49

## 2022-05-18 RX ADMIN — ATORVASTATIN CALCIUM 20 MG: 20 TABLET, FILM COATED ORAL at 07:53

## 2022-05-18 RX ADMIN — SODIUM CHLORIDE, PRESERVATIVE FREE 10 ML: 5 INJECTION INTRAVENOUS at 07:54

## 2022-05-18 RX ADMIN — CHLORPROMAZINE HYDROCHLORIDE 25 MG: 25 TABLET, SUGAR COATED ORAL at 12:49

## 2022-05-18 RX ADMIN — ALBUTEROL SULFATE 2 PUFF: 90 AEROSOL, METERED RESPIRATORY (INHALATION) at 14:59

## 2022-05-18 RX ADMIN — PANTOPRAZOLE SODIUM 40 MG: 40 TABLET, DELAYED RELEASE ORAL at 07:53

## 2022-05-18 NOTE — PLAN OF CARE
Problem: Safety - Adult  Goal: Free from fall injury  5/18/2022 0829 by Tarun Saxena RN  Outcome: Completed  5/18/2022 0828 by Tarun Saxena RN  Outcome: Progressing  Flowsheets (Taken 5/17/2022 2023 by Ramón Pierre LPN)  Free From Fall Injury: Instruct family/caregiver on patient safety     Problem: ABCDS Injury Assessment  Goal: Absence of physical injury  5/18/2022 0829 by Tarun Saxena RN  Outcome: Completed  5/18/2022 0828 by Tarun Saxena RN  Outcome: Progressing  Flowsheets (Taken 5/17/2022 2023 by Ramón Pierre LPN)  Absence of Physical Injury: Implement safety measures based on patient assessment     Problem: Pain  Goal: Verbalizes/displays adequate comfort level or baseline comfort level  5/18/2022 0829 by Tarun Saxena RN  Outcome: Completed  5/18/2022 0828 by Tarun Saxena RN  Outcome: Progressing

## 2022-05-18 NOTE — PROGRESS NOTES
[facility] Hospitalists Progress Note    Patient:  Lj Muniz  YOB: 1945  Date of Service: 5/18/2022  MRN: [id number]   Acct: [de-identified]   Primary Care Physician: Vannesa Olivares MD  Advance Directive: DNR  Admit Date: 5/17/2022       Hospital Day: 1        CHIEF COMPLAINT:     Chief Complaint   Patient presents with    Chest Pain    Shortness of Breath       5/18/2022 3:11 PM  Subjective / Interval History:   05/18/2022  No acute changes or acute overnight event reported. Shortness of breath improved. Patient denies any active chest pain at this time. No fever or chills. Sitting comfortably in bed in no apparent acute distress. Patient denies any acute complaints or distress at this time. Review of Systems:   Review of Systems  ROS: 14 point review of systems is negative except as specifically addressed above. ADULT DIET;  Regular; Low Fat/Low Chol/High Fiber/KAILEE; Safety Tray; Safety Tray (Disposables)    Intake/Output Summary (Last 24 hours) at 5/18/2022 1511  Last data filed at 5/18/2022 1128  Gross per 24 hour   Intake 690 ml   Output --   Net 690 ml       Medications:   dextrose      sodium chloride      sodium chloride       Current Facility-Administered Medications   Medication Dose Route Frequency Provider Last Rate Last Admin    baricitinib (OLUMIANT) tablet 4 mg  4 mg Oral Daily Coretta Flores MD   4 mg at 05/18/22 0752    chlorproMAZINE (THORAZINE) tablet 25 mg  25 mg Oral TID Danitza Ferrell MD   25 mg at 05/18/22 1249    zinc sulfate (ZINCATE) capsule 50 mg  50 mg Oral Daily Danitza Ferrell MD   50 mg at 05/18/22 1249    ascorbic acid (VITAMIN C) tablet 1,000 mg  1,000 mg Oral Daily Danitza Ferrell MD   1,000 mg at 05/18/22 1249    vitamin D (ERGOCALCIFEROL) capsule 50,000 Units  50,000 Units Oral Weekly Danitza Ferrell MD   50,000 Units at 05/18/22 1249    glucose chewable tablet 16 g  4 tablet Oral PRN Danitza Ferrell MD        dextrose PRN Teodoro Guillaume MD        Or   Leanne Flores acetaminophen (TYLENOL) suppository 650 mg  650 mg Rectal Q6H PRN Teodoro Guillaume MD        potassium chloride (KLOR-CON M) extended release tablet 40 mEq  40 mEq Oral PRN Teodoro Guillaume MD        Or    potassium bicarb-citric acid (EFFER-K) effervescent tablet 40 mEq  40 mEq Oral PRN Teodoro Guillaume MD        Or   Leanne [place] potassium chloride 10 mEq/100 mL IVPB (Peripheral Line)  10 mEq IntraVENous PRN Teodoro Guillaume MD        magnesium sulfate 2000 mg in 50 mL IVPB premix  2,000 mg IntraVENous PRN Teodoro Guillaume MD        dexamethasone (PF) (DECADRON) injection 6 mg  6 mg IntraVENous Q12H Teodoro Guillaume MD   6 mg at 05/18/22 0545    albuterol sulfate  (90 Base) MCG/ACT inhaler 2 puff  2 puff Inhalation 4x daily Teodoro Guillaume MD   2 puff at 05/18/22 1459    budesonide-formoterol (SYMBICORT) 160-4.5 MCG/ACT inhaler 2 puff  2 puff Inhalation BID Teodoro Guillaume MD   2 puff at 05/18/22 0754    insulin lispro (HUMALOG) injection vial 0-12 Units  0-12 Units SubCUTAneous TID  Teodoro Guillaume MD   2 Units at 05/18/22 1207    insulin lispro (HUMALOG) injection vial 0-6 Units  0-6 Units SubCUTAneous Nightly Teodoro Guillaume MD        enoxaparin (LOVENOX) injection 90 mg  1 mg/kg SubCUTAneous BID Fay Leija MD   90 mg at 05/18/22 0754         dextrose      sodium chloride      sodium chloride        baricitinib  4 mg Oral Daily    chlorproMAZINE  25 mg Oral TID    zinc sulfate  50 mg Oral Daily    vitamin C  1,000 mg Oral Daily    vitamin D  50,000 Units Oral Weekly    insulin glargine  25 Units SubCUTAneous Nightly    insulin lispro  6 Units SubCUTAneous TID     cefepime  2,000 mg IntraVENous Q12H    atorvastatin  20 mg Oral Daily    carvedilol  12.5 mg Oral BID     clopidogrel  75 mg Oral Daily    lisinopril  40 mg Oral Daily    pantoprazole  40 mg Oral Daily    sodium chloride flush  5-40 mL IntraVENous 2 times per day    dexamethasone  6 mg IntraVENous Q12H    albuterol sulfate HFA  2 puff Inhalation 4x daily    budesonide-formoterol  2 puff Inhalation BID    insulin lispro  0-12 Units SubCUTAneous TID     insulin lispro  0-6 Units SubCUTAneous Nightly    enoxaparin  1 mg/kg SubCUTAneous BID     glucose, dextrose bolus **OR** dextrose bolus, glucagon (rDNA), dextrose, sodium chloride flush, sodium chloride, ondansetron **OR** ondansetron, polyethylene glycol, acetaminophen **OR** acetaminophen, potassium chloride **OR** potassium alternative oral replacement **OR** potassium chloride, magnesium sulfate  ADULT DIET; Regular; Low Fat/Low Chol/High Fiber/KAILEE; Safety Tray; Safety Tray (Disposables)       Labs:   CBC with DIFF:  Recent Labs     05/17/22  0206 05/18/22  0404   WBC 11.5* 11.1*   RBC 4.41* 3.84*   HGB 13.7* 12.0*   HCT 43.5 36.2*   MCV 98.6* 94.3*   MCH 31.1* 31.3*   MCHC 31.5* 33.1   RDW 13.4 13.3    207   MPV 9.8 10.5   NEUTOPHILPCT 60.1 83.6*   LYMPHOPCT 29.6 11.0*   MONOPCT 7.9 4.8   EOSRELPCT 1.0 0.0   BASOPCT 0.4 0.1   NEUTROABS 6.9 9.2*   LYMPHSABS 3.4 1.2   MONOSABS 0.90 0.50   EOSABS 0.10 0.00   BASOSABS 0.10 0.00       CMP/BMP:  Recent Labs     05/17/22  0206 05/17/22  0212 05/17/22  0222 05/18/22  0404     --   --  136   K 4.1 4.1  --  4.0   CL 99  --   --  104   CO2 14*  --   --  19*   ANIONGAP 24*  --   --  13   GLUCOSE 305*  --  284 204*   BUN 14  --   --  29*   CREATININE 1.5*  --   --  1.1   LABGLOM 46*  --   --  >60   CALCIUM 9.4  --   --  9.1   PROT 7.1  --   --  6.7   LABALBU 4.3  --   --  3.5   BILITOT 1.0  --   --  0.5   ALKPHOS 55  --   --  39*   ALT 48*  --   --  52*   AST 65*  --   --  56*         CRP:    Recent Labs     05/17/22  0206 05/18/22  0404   CRP 3.12* 4.44*     Sed Rate:  No results for input(s): SEDRATE in the last 72 hours.       HgBA1c:  No components found for: HGBA1C  FLP:    Lab Results   Component Value Date    TRIG 99 04/07/2021    HDL 44 04/07/2021    LDLCALC 47 04/07/2021    LDLDIRECT 83 03/17/2014     TSH:    Lab Results   Component Value Date    TSH 1.95 03/16/2014     Troponin T:   Recent Labs     05/17/22 0206   TROPONINI 0.03     Pro-BNP: No results for input(s): BNP in the last 72 hours. INR:   Recent Labs     05/17/22  0206   INR 1.23*     ABGs:   Lab Results   Component Value Date    PHART 7.280 05/17/2022    PO2ART 107.0 05/17/2022    ICG1SWE 24.0 05/17/2022     UA:  Recent Labs     05/17/22  0439   COLORU YELLOW   PHUR 5.0   CLARITYU Clear   SPECGRAV 1.016   LEUKOCYTESUR Negative   UROBILINOGEN 0.2   BILIRUBINUR Negative   BLOODU Negative   GLUCOSEU 100*         Culture Results:    No results for input(s): CXSURG in the last 720 hours. Blood Culture Recent: No results for input(s): BC in the last 720 hours. No results for input(s): BC, BLOODCULT2, ORG in the last 72 hours. Cultures:   No results for input(s): CULTURE in the last 72 hours. No results for input(s): BC, [place] Holes in the last 72 hours. No results for input(s): CXSURG in the last 72 hours. No results for input(s): MG, PHOS in the last 72 hours. Recent Labs     05/17/22 0206 05/18/22  0404   AST 65* 56*   ALT 48* 52*   BILITOT 1.0 0.5   ALKPHOS 55 39*         RAD:   CT ABDOMEN PELVIS W IV CONTRAST Additional Contrast? None    Result Date: 5/17/2022  CT ABDOMEN PELVIS W IV CONTRAST 5/17/2022 7:57 AM History: Chest tightness and shortness of breath. Lactic acidosis. History of coronary artery disease and prior CABG. Initial O2 saturation = 82%. Tachycardia. Tachypnea. In order to have a CT radiation dose as low as reasonably achievable Automated Exposure Control was utilized for adjustment of the mA and/or KV according to patient size. DLP in mGycm= 1400. Abdomen/pelvis CT with IV contrast. Axial, sagittal, and coronal sequences. A preliminary [employer] report was transmitted to the appropriate department immediately after this study was interpreted.  Heart size is normal. normal opacification of pulmonary arteries and branches bilaterally. No filling defects in the opacified pulmonary arterial bed. The RV/LV ratio is 49/56 which is normal. Atheromatous changes thoracic aorta are seen. No aneurysmal dilatation or dissection. Severe atheromatous changes of coronary arteries are seen. There is evidence of a prior CABG. A few nonspecific mediastinal and hilar lymph nodes are seen. The limited visualized soft tissues of the neck is unremarkable. The lungs are poorly expanded. There are extensive respiratory motion artifacts which limits the diagnostic yield of the study. There are interlobular septal thickening there are scattered groundglass opacity which may represent pulmonary congestion/pulmonary edema. There is a small bibasal pleural effusion. The visualized tracheobronchial structures are patent. The liver and spleen are suboptimally evaluated due to extensive streak artifacts. The gallbladder is incompletely visualized and not evaluated evaluated. The adrenal glands bilaterally are normal. The pancreas and kidneys are incompletely included in the study and not evaluated. The abdomen is separately reviewed and reported. The images reviewed in bone window show chronic degenerative changes of the thoracic spine. No acute bony abnormalities noted. 1. No evidence of pulmonary embolism. No aortic aneurysm or dissection. Severe atheromatous changes of coronary arteries. Evidence of a prior CABG. 2. Pulmonary congestion or pulmonary edema. The lungs are suboptimally evaluated due to respiratory motion artifacts. 3. Small bibasal pleural effusion. Above study was initially reviewed and reported by [facility] [employer]. I do not find any discrepancies.  Signed by Dr Piper Boswell      Objective:   Vitals:   BP 97/79   Pulse 74   Temp 97.4 °F (36.3 °C) (Temporal)   Resp 16   Ht 5' 10\" (1.778 m)   Wt 200 lb 12.8 oz (91.1 kg)   SpO2 97%   BMI 28.81 kg/m²       Patient Vitals for the past 24 hrs:   BP Temp Temp src Pulse Resp SpO2   05/18/22 1128 97/79 97.4 °F (36.3 °C) Temporal 74 16 97 %   05/18/22 1046 -- -- -- 80 -- 96 %   05/18/22 0833 -- -- -- 82 -- --   05/18/22 0720 -- -- -- 72 -- --   05/18/22 0614 113/61 97.9 °F (36.6 °C) -- 66 18 94 %   05/17/22 2249 104/64 97.9 °F (36.6 °C) -- 80 18 97 %   05/17/22 2025 -- -- -- 85 -- --   05/17/22 1715 -- 98.5 °F (36.9 °C) Temporal -- 18 96 %   05/17/22 1710 118/69 -- -- 92 -- --       24HR INTAKE/OUTPUT:      Intake/Output Summary (Last 24 hours) at 5/18/2022 1511  Last data filed at 5/18/2022 1128  Gross per 24 hour   Intake 690 ml   Output --   Net 690 ml       Physical Exam  Vitals and nursing note reviewed. Constitutional:       General: He is not in acute distress. Appearance: Normal appearance. He is not ill-appearing, toxic-appearing or diaphoretic. HENT:      Head: Normocephalic and atraumatic. Right Ear: External ear normal.      Left Ear: External ear normal.      Nose: Nose normal. No congestion or rhinorrhea. Mouth/Throat:      Pharynx: No oropharyngeal exudate or posterior oropharyngeal erythema. Eyes:      General: No scleral icterus. Right eye: No discharge. Left eye: No discharge. Extraocular Movements: Extraocular movements intact. Conjunctiva/sclera: Conjunctivae normal.   Pulmonary:      Effort: Pulmonary effort is normal. No respiratory distress. Abdominal:      General: Bowel sounds are normal. There is no distension. Palpations: Abdomen is soft. Musculoskeletal:         General: No swelling, tenderness, deformity or signs of injury. Normal range of motion. Cervical back: Normal range of motion and neck supple. No rigidity. No muscular tenderness. Right lower leg: No edema. Left lower leg: No edema. Skin:     General: Skin is warm and dry. Coloration: Skin is not jaundiced or pale. Findings: No bruising, erythema, lesion or rash.    Neurological: General: No focal deficit present. Mental Status: He is alert and oriented to person, place, and time. Cranial Nerves: No cranial nerve deficit. Motor: No weakness. Coordination: Coordination normal.   Psychiatric:         Mood and Affect: Mood normal.         Behavior: Behavior normal.         Thought Content: Thought content normal.         Judgment: Judgment normal.           Assessment/plan:       Hospital Problems           Last Modified POA    * (Principal) Acute hypoxemic respiratory failure due to COVID-19 ([address] 5/17/2022 Yes    Paroxysmal atrial fibrillation with rapid ventricular response ([address] 5/17/2022 Yes    Overview Signed 3/20/2014  2:43 PM by Norris Maryana     s/p cardioversion         Type 2 diabetes mellitus with hyperglycemia ([address] 5/18/2022 Yes    Overview Signed 5/18/2022 11:56 AM by Flakita Hoover MD     DMT2 - newly diagnosed  · Hemoglobin A1c: 7.3% (05/17/2022)  · Metformin 1000 mg p.o. twice daily (start date: 05/18/2022)         Pacemaker 5/17/2022 Yes    Essential hypertension 5/17/2022 Yes    CAD (coronary artery disease) 5/17/2022 Yes    Mixed hyperlipidemia 5/17/2022 Yes    Nonischemic cardiomyopathy ([address] 5/17/2022 Yes    Overview Signed 11/2/2021 12:57 PM by Mary Garland DO     Severe LV systolic dysfunction, ejection fraction 15-20% by cath, 2014               Principal Problem:    Acute hypoxemic respiratory failure due to COVID-19 [facility])  Active Problems:    Paroxysmal atrial fibrillation with rapid ventricular response (HCC)    Type 2 diabetes mellitus with hyperglycemia (HCC)    Pacemaker    Essential hypertension    CAD (coronary artery disease)    Mixed hyperlipidemia    Nonischemic cardiomyopathy ([address]  Resolved Problems:    * No resolved hospital problems.  *        Brief Summary  Mr Carmen Link, a 77 y.o. male with cardiovascular disease, history of A. fib s/p cardioversion, history of heart block s/p pacemaker, presented to the ED with worsening shortness of breath with associated cough over the past 2 days, noting some chest tightness difficulty taking a deep breath. He was significantly tachypneic on arrival and hypoxic with SPO2 82% and screened positive for COVID-19. Due to his increased work of breathing he was placed on BiPAP. He appeared to initially be in SVT with heart rate up to 160, however after given dose of Cardizem his heart rate came down some and was in sinus tachycardia. His tachypnea and work of breathing dramatically improved on BiPAP and was weaned to nasal cannula. Chest x-ray Moderate pulmonary vascular congestion    CTA Chest: No evidence of pulmonary embolism. No aortic aneurysm or  dissection. Severe atheromatous changes of coronary arteries. Evidence of a prior CABG. Pulmonary congestion or pulmonary edema. The lungs are suboptimally evaluated due to respiratory motion artifacts. . Small bibasal pleural effusion. CT Abd/Pelvis: Tiny gallstones with no sign of cholecystitis and no biliary  dilation. Large amount of fecal material within the rectum. No mass, fluid collection, or inflammatory process within the abdomen or pelvis. Acute hypoxic respiratory failure due to COVID-19 infection  · Improved on BiPAP,   · Continue supplemental oxygen as needed   · Procalcitonin within lab reference range, and no obvious signs of bacterial infection  · Elevated CRP: 3.12 --> 4.44 (05/18/2022)  · Oxygen supplementation to keep SPO2 above 90%  · Continuous Pulse Ox  · Dexamethasone  · Albuterol and Ipratropium inhalers  · Adjunct therapy with; Famotidine, Melatonin, Vitamin C, D, Zinc, & Inhaled Corticosteroids. · Baricitinib (Olumiant) PO Daily-dosing as per pharmacy.   · Monitor LFTs and renal function   · Avoid Nebulizer treatments to avoid aerosolization.    · Encourage Prone positioning  · Enoxaparin subcu         Sepsis  Metabolic Acidosis   Lactic Acidosis,   · Initial lactic acid of: 10.6 (05/17/2022). · Procalcitonin: 0.12 -->  7.85 (05/18/2022)  · Lactic acid trended down: 10.6--> 2.8--> 2.3 (05/18/2022)  · UA Grossly unremarkable  · Broad-spectrum antibiotic (Vancomycin and Cefepime)  · Blood Culture pending  · IV fluids  · Trend lactic acid level        Cardiovascular disease  · Resumed home regimen including; Plavix, Coreg, statin     Diabetes Mellitus II - newly diagnosed  · Hemoglobin A1c: 7.3% (05/17/2022)   Inpatient Regimens to include;  o - Insulin Glargine (Lantus) 25 units subcu nightly  o - Insulin Lispro (Humalog) 6 units subcu pre-meal 3 times a day  o - Insulin Lispro (Humalog) on a Medium dose sliding scale   Monitor POC glucose, and adjust insulin regimen accordingly based on daily insulin requirement.       Hypertension  · Continued home meds including lisinopril and Coreg,   · Monitor BP      Paroxysmal atrial fibrillation with RVR  · Currently in sinus rhythm  · Seen by Cardiology  · Follow-up with cardiology outpatient.     Vitamin D deficiency  · 25-hydroxy vitamin D 19.8 (05/18/2022)  · Vitamin D (ergocalciferol) 50,000 units p.o. weekly (start date: 05/18/2022)     Intractable Hiccups  · Chlorpromazine 25 mg p.o. 3 times daily  (start date: 05/18/2022)  · Discontinued metoclopramide, after discussion with patient about the interaction between metoclopramide and chlorpromazine.           Continue management of other chronic medical conditions - see above and orders. Advance Directive: DNR    ADULT DIET; Regular; Low Fat/Low Chol/High Fiber/KAILEE; Safety Tray; Safety Tray (Disposables)         Consults Made:   IP CONSULT TO PHARMACY  PALLIATIVE CARE EVAL  IP CONSULT TO CARDIOLOGY  PHARMACY TO DOSE VANCOMYCIN    DVT prophylaxis: Enoxaparin      Discharge planning  Continue current management,     Time Spent is 35 mins in the examination, evaluation, counseling and review of medications, assessment and plan.      Electronically signed by   Emmanuel Thorne MD, MPH, MD,   Internal Medicine Hospitalist   5/18/2022 3:11 PM

## 2022-05-18 NOTE — PROGRESS NOTES
Pharmacy Renal Adjustment    Palma Villalobos is a 77 y.o. male. Pharmacy has renally adjusted medications per protocol. Recent Labs     05/17/22  0206 05/18/22  0404   BUN 14 29*       Recent Labs     05/17/22  0206 05/18/22  0404   CREATININE 1.5* 1.1       Estimated Creatinine Clearance: 66 mL/min (based on SCr of 1.1 mg/dL).     Height:   Ht Readings from Last 1 Encounters:   05/17/22 5' 10\" (1.778 m)     Weight:  Wt Readings from Last 1 Encounters:   05/17/22 200 lb 12.8 oz (91.1 kg)       Plan: Adjust the following medications based on renal function:           Metoclopramide back to 10 mg po four times daily    Electronically signed by Addison Bamberger, [facility] on 5/18/2022 at 6:33 AM

## 2022-05-18 NOTE — PLAN OF CARE
Problem: Safety - Adult  Goal: Free from fall injury  Outcome: Progressing  Flowsheets (Taken 5/17/2022 2023 by Jennifer Mora LPN)  Free From Fall Injury: Instruct family/caregiver on patient safety     Problem: ABCDS Injury Assessment  Goal: Absence of physical injury  Outcome: Progressing  Flowsheets (Taken 5/17/2022 2023 by Jennifer Mora LPN)  Absence of Physical Injury: Implement safety measures based on patient assessment     Problem: Pain  Goal: Verbalizes/displays adequate comfort level or baseline comfort level  Outcome: Progressing

## 2022-05-18 NOTE — PROGRESS NOTES
3107 P.O. On R/A @ Rest Sat was 97%  0905 P.O. on R/A @ Exercise Sat was 97 % pt. Walked around the room.

## 2022-05-18 NOTE — PROGRESS NOTES
Baricitinib Daily Monitoring    Please keep this iVent open for the duration of baricitinib treatment and monitor daily. Recent Labs     05/17/22  0206 05/18/22  0404   LABGLOM 46* >60   GFRAA 55* >59   LYMPHSABS 3.4 1.2   NEUTROABS 6.9 9.2*   ALT 48* 52*   AST 65* 56*           Plan:  Change baricitinib to 4 mg daily.     Electronically signed by Trena Brunner PharmD on 5/18/2022 at 6:36 AM

## 2022-05-18 NOTE — PROGRESS NOTES
Occupational Therapy  Facility/Department: [address] Initial Assessment    Name: Tammie Ríos  : 1945  MRN: [id number]  Date of Service: 2022    Discharge Recommendations:             Patient Diagnosis(es): The primary encounter diagnosis was Acute respiratory failure with hypoxemia ([address] Diagnoses of Lactic acidosis, Paroxysmal supraventricular tachycardia ([address] and COVID-19 were also pertinent to this visit. Past Medical History:  has a past medical history of Atrial fibrillation ([address] CAD (coronary artery disease), CHB (complete heart block) ([address] GERD (gastroesophageal reflux disease), Hyperlipidemia, Hypertension, Ischemic dilated cardiomyopathy ([address] Mitral regurgitation, and Pacemaker. Past Surgical History:  has a past surgical history that includes Cardioversion (3/19/14  [facility]); Cardiac catheterization (3/19/14  [facility]); Pacemaker insertion (3/31/14  [employer]); Cardiac valve replacement (2014); and Coronary artery bypass graft (2014). Treatment Diagnosis: Tachycardia, ARF      Assessment   Assessment: Pt. independent with ADLs and tfers, O2 sats at 96% or higher. No further OT .   Treatment Diagnosis: Tachycardia, ARF  Prognosis: Good  Decision Making: Low Complexity  REQUIRES OT FOLLOW-UP: No  Activity Tolerance  Activity Tolerance: Patient Tolerated treatment well        Plan   Plan  Times per Week: OT eval only     Restrictions  Restrictions/Precautions  Restrictions/Precautions: Isolation  Required Braces or Orthoses?: No  Implants present? : Pacemaker    Subjective   General  Chart Reviewed: Yes  Patient assessed for rehabilitation services?: Yes  Diagnosis: Tachycardia  General Comment  Comments: Pt. states he feels fine and is ready to go home  Pain: reports none  Social/Functional History  Social/Functional History  Lives With: Alone  Type of Home: House  ADL Assistance: Independent  Homemaking Assistance: [address] Responsibilities: Yes  Ambulation Assistance: Independent  Transfer Assistance: Independent  Active : Yes  Leisure & Hobbies: gardening       Objective   Pulse: 80 (91 with amb)  BP: 113/61  MAP (Calculated): 78.33  Resp: 18  SpO2: 96 % (95-96 with amb.)  O2 Device: None (Room air)          Observation/Palpation  Posture: Good  Observation: pt dressed in his clothes  Safety Devices  Type of Devices: Call light within reach; Left in bed;Nurse notified        AROM: Within functional limits  Strength:  Within functional limits  Coordination: Within functional limits  ADL  Feeding: Independent  Grooming: Independent  UE Bathing: Independent  LE Bathing: Independent  UE Dressing: Independent  LE Dressing: Independent  Toileting: Independent     Activity Tolerance  Activity Tolerance: Patient tolerated treatment well  Activity Tolerance Comments: sats remained in mid to high 90s     Transfers  Stand Step Transfers: Independent  Sit to stand: Independent     Cognition  Overall Cognitive Status: WNL                                           G-Code     OutComes Score                                                  AM-PAC Score             Goals  Short Term Goals  Time Frame for Short term goals: OT eval only  Long Term Goals  Time Frame for Long term goals : OT eval only       Therapy Time   Individual Concurrent Group Co-treatment   Time In           Time Out           Minutes                   Irene Solorzano OT

## 2022-05-18 NOTE — PROGRESS NOTES
Physical Therapy  Facility/Department: [facility] 4 ONCOLOGY UNIT  Physical Therapy Initial Assessment    Name: Zane Nichols  : 1945  MRN: [id number]  Date of Service: 2022    Discharge Recommendations:  Home with assist PRN          Patient Diagnosis(es): The primary encounter diagnosis was Acute respiratory failure with hypoxemia ([address] Diagnoses of Lactic acidosis, Paroxysmal supraventricular tachycardia ([address] and COVID-19 were also pertinent to this visit. Past Medical History:  has a past medical history of Atrial fibrillation ([address] CAD (coronary artery disease), CHB (complete heart block) ([address] GERD (gastroesophageal reflux disease), Hyperlipidemia, Hypertension, Ischemic dilated cardiomyopathy ([address] Mitral regurgitation, and Pacemaker. Past Surgical History:  has a past surgical history that includes Cardioversion (3/19/14  [facility]); Cardiac catheterization (3/19/14  [facility]); Pacemaker insertion (3/31/14  [facility]); Cardiac valve replacement (2014); and Coronary artery bypass graft (2014). Assessment   Assessment: Pt. tolerated mobility well and has no therapy needs while in acute care. Safe to d/c home from mobility standpoint.   Treatment Diagnosis: SBA mobility and gait  Therapy Prognosis: Good  Decision Making: Low Complexity  Barriers to Learning: none noted  No Skilled PT: Safe to return home  Requires PT Follow-Up: No  Activity Tolerance  Activity Tolerance: Patient tolerated treatment well  Activity Tolerance Comments: sats remained in mid to high 90s     Plan   Plan  Plan weeks: eval only  Plan Comment: d/c PT  Safety Devices  Type of Devices: Call light within reach,Left in bed,Nurse notified     Restrictions  Restrictions/Precautions  Restrictions/Precautions: Isolation  Required Braces or Orthoses?: No  Implants present? : Pacemaker     Subjective   Pain: reports none  General  Chart Reviewed: Yes  Patient assessed for rehabilitation services?: Yes  Response To Previous Treatment: Not applicable  Family / Caregiver Present: No  Referring Practitioner: Janice Pennington MD  Referral Date : 05/17/22  Diagnosis: covid 23, acute respiratory failure with hypoxemia, lactic acidosis, SVT  Follows Commands: Within Functional Limits  General Comment  Comments: RN, Michael, shan PT. Subjective  Subjective: Pt. states he has been getting up in the room on his own and is ready to go home. Social/Functional History  Social/Functional History  Lives With: Alone  Type of Home: House  ADL Assistance: Independent  Homemaking Assistance: Independent  Homemaking Responsibilities: Yes  Ambulation Assistance: Independent  Transfer Assistance: Independent  Active : Yes  Leisure & Hobbies: gardening  Vision/Hearing  Hearing: Within functional limits    Cognition   Orientation  Overall Orientation Status: Within Normal Limits  Cognition  Overall Cognitive Status: WNL     Objective   Pulse: 80 (91 with amb)  BP: 113/61  MAP (Calculated): 78.33  Resp: 18  SpO2: 96 % (95-96 with amb.)  O2 Device: None (Room air)     Observation/Palpation  Posture: Good  Observation: pt dressed in his clothes        AROM RLE (degrees)  RLE AROM: WFL  AROM LLE (degrees)  LLE AROM : WFL  Strength RLE  Strength RLE: WFL  Comment: grossly 5/5  Strength LLE  Strength LLE: WFL  Comment: grossly 5/5           Bed mobility  Supine to Sit: Unable to assess  Sit to Supine: Independent  Bed Mobility Comments: pt already seated EOB  Transfers  Sit to Stand: Independent  Stand to sit:  Independent  Ambulation  Surface: level tile  Device: No Device  Assistance: Supervision  Quality of Gait: slight LOB but able to self correct  Gait Deviations: None  Distance: 70'+     Balance  Posture: Good  Sitting - Static: Good;+  Sitting - Dynamic: Good;+  Standing - Static: Good;+  Standing - Dynamic: Good           OutComes Score                                                  AM-PAC Score             Goals  Patient Goals   Patient goals : go home ASAP       Education  Patient Education  Education Provided Comments: use of call light for needs      Therapy Time   Individual Concurrent Group Co-treatment   Time In           Time Out           Minutes                   Yovana Nunez PT     Electronically signed by Yovana Nunez PT on 5/18/2022 at 10:52 AM

## 2022-05-19 PROBLEM — R78.81 BACTEREMIA DUE TO GRAM-POSITIVE BACTERIA: Status: ACTIVE | Noted: 2022-05-19

## 2022-05-19 LAB
ACINETOBACTER CALCOAC BAUMANNII COMPLEX BY PCR: NOT DETECTED
ALBUMIN SERPL-MCNC: 3.4 G/DL (ref 3.5–5.2)
ALP BLD-CCNC: 35 U/L (ref 40–130)
ALT SERPL-CCNC: 47 U/L (ref 5–41)
ANION GAP SERPL CALCULATED.3IONS-SCNC: 10 MMOL/L (ref 7–19)
AST SERPL-CCNC: 37 U/L (ref 5–40)
BACTEROIDES FRAGILIS BY PCR: NOT DETECTED
BASOPHILS ABSOLUTE: 0 K/UL (ref 0–0.2)
BASOPHILS RELATIVE PERCENT: 0 % (ref 0–1)
BILIRUB SERPL-MCNC: 0.3 MG/DL (ref 0.2–1.2)
BUN BLDV-MCNC: 26 MG/DL (ref 8–23)
C-REACTIVE PROTEIN: 1.57 MG/DL (ref 0–0.5)
CALCIUM SERPL-MCNC: 8.6 MG/DL (ref 8.8–10.2)
CANDIDA ALBICANS BY PCR: NOT DETECTED
CANDIDA AURIS BY PCR: NOT DETECTED
CANDIDA GLABRATA BY PCR: NOT DETECTED
CANDIDA KRUSEI BY PCR: NOT DETECTED
CANDIDA PARAPSILOSIS BY PCR: NOT DETECTED
CANDIDA TROPICALIS BY PCR: NOT DETECTED
CHLORIDE BLD-SCNC: 105 MMOL/L (ref 98–111)
CO2: 21 MMOL/L (ref 22–29)
CREAT SERPL-MCNC: 0.8 MG/DL (ref 0.5–1.2)
CRYPTOCOCCUS NEOFORMANS/GATTII BY PCR: NOT DETECTED
ENTEROBACTER CLOACAE COMPLEX BY PCR: NOT DETECTED
ENTEROBACTERALES BY PCR: NOT DETECTED
ENTEROCOCCUS FAECALIS BY PCR: NOT DETECTED
ENTEROCOCCUS FAECIUM BY PCR: NOT DETECTED
EOSINOPHILS ABSOLUTE: 0 K/UL (ref 0–0.6)
EOSINOPHILS RELATIVE PERCENT: 0 % (ref 0–5)
ESCHERICHIA COLI BY PCR: NOT DETECTED
GFR AFRICAN AMERICAN: >59
GFR NON-AFRICAN AMERICAN: >60
GLUCOSE BLD-MCNC: 143 MG/DL (ref 70–99)
GLUCOSE BLD-MCNC: 186 MG/DL (ref 70–99)
GLUCOSE BLD-MCNC: 189 MG/DL (ref 70–99)
GLUCOSE BLD-MCNC: 217 MG/DL (ref 74–109)
GLUCOSE BLD-MCNC: 233 MG/DL (ref 70–99)
HAEMOPHILUS INFLUENZAE BY PCR: NOT DETECTED
HCT VFR BLD CALC: 34.8 % (ref 42–52)
HEMOGLOBIN: 11.3 G/DL (ref 14–18)
IMMATURE GRANULOCYTES #: 0.1 K/UL
KLEBSIELLA AEROGENES BY PCR: NOT DETECTED
KLEBSIELLA OXYTOCA BY PCR: NOT DETECTED
KLEBSIELLA PNEUMONIAE GROUP BY PCR: NOT DETECTED
LACTIC ACID: 1.7 MMOL/L (ref 0.5–1.9)
LISTERIA MONOCYTOGENES BY PCR: NOT DETECTED
LYMPHOCYTES ABSOLUTE: 1.4 K/UL (ref 1.1–4.5)
LYMPHOCYTES RELATIVE PERCENT: 13.6 % (ref 20–40)
MCH RBC QN AUTO: 31.7 PG (ref 27–31)
MCHC RBC AUTO-ENTMCNC: 32.5 G/DL (ref 33–37)
MCV RBC AUTO: 97.5 FL (ref 80–94)
MONOCYTES ABSOLUTE: 0.7 K/UL (ref 0–0.9)
MONOCYTES RELATIVE PERCENT: 6.5 % (ref 0–10)
NEISSERIA MENINGITIDIS BY PCR: NOT DETECTED
NEUTROPHILS ABSOLUTE: 8.1 K/UL (ref 1.5–7.5)
NEUTROPHILS RELATIVE PERCENT: 79.4 % (ref 50–65)
PDW BLD-RTO: 13.1 % (ref 11.5–14.5)
PERFORMED ON: ABNORMAL
PLATELET # BLD: 205 K/UL (ref 130–400)
PMV BLD AUTO: 10.5 FL (ref 9.4–12.4)
POTASSIUM REFLEX MAGNESIUM: 4.5 MMOL/L (ref 3.5–5)
PROCALCITONIN: 5.55 NG/ML (ref 0–0.09)
PROTEUS SPECIES BY PCR: NOT DETECTED
PSEUDOMONAS AERUGINOSA BY PCR: NOT DETECTED
RBC # BLD: 3.57 M/UL (ref 4.7–6.1)
SALMONELLA SPECIES BY PCR: NOT DETECTED
SERRATIA MARCESCENS BY PCR: NOT DETECTED
SODIUM BLD-SCNC: 136 MMOL/L (ref 136–145)
STAPHYLOCOCCUS AUREUS BY PCR: NOT DETECTED
STAPHYLOCOCCUS EPIDERMIDIS BY PCR: NOT DETECTED
STAPHYLOCOCCUS LUGDUNENSIS BY PCR: NOT DETECTED
STAPHYLOCOCCUS SPECIES BY PCR: DETECTED
STENOTROPHOMONAS MALTOPHILIA BY PCR: NOT DETECTED
STREPTOCOCCUS AGALACTIAE BY PCR: NOT DETECTED
STREPTOCOCCUS PNEUMONIAE BY PCR: NOT DETECTED
STREPTOCOCCUS PYOGENES  BY PCR: NOT DETECTED
STREPTOCOCCUS SPECIES BY PCR: NOT DETECTED
TOTAL PROTEIN: 5.8 G/DL (ref 6.6–8.7)
WBC # BLD: 10.2 K/UL (ref 4.8–10.8)

## 2022-05-19 PROCEDURE — 6360000002 HC RX W HCPCS: Performed by: INTERNAL MEDICINE

## 2022-05-19 PROCEDURE — 36415 COLL VENOUS BLD VENIPUNCTURE: CPT

## 2022-05-19 PROCEDURE — 6370000000 HC RX 637 (ALT 250 FOR IP): Performed by: INTERNAL MEDICINE

## 2022-05-19 PROCEDURE — 83605 ASSAY OF LACTIC ACID: CPT

## 2022-05-19 PROCEDURE — 86140 C-REACTIVE PROTEIN: CPT

## 2022-05-19 PROCEDURE — 84145 PROCALCITONIN (PCT): CPT

## 2022-05-19 PROCEDURE — 2100000000 HC CCU R&B

## 2022-05-19 PROCEDURE — 2580000003 HC RX 258: Performed by: INTERNAL MEDICINE

## 2022-05-19 PROCEDURE — 2580000003 HC RX 258: Performed by: STUDENT IN AN ORGANIZED HEALTH CARE EDUCATION/TRAINING PROGRAM

## 2022-05-19 PROCEDURE — 6370000000 HC RX 637 (ALT 250 FOR IP): Performed by: STUDENT IN AN ORGANIZED HEALTH CARE EDUCATION/TRAINING PROGRAM

## 2022-05-19 PROCEDURE — 82947 ASSAY GLUCOSE BLOOD QUANT: CPT

## 2022-05-19 PROCEDURE — 87040 BLOOD CULTURE FOR BACTERIA: CPT

## 2022-05-19 PROCEDURE — A4216 STERILE WATER/SALINE, 10 ML: HCPCS | Performed by: INTERNAL MEDICINE

## 2022-05-19 PROCEDURE — 85025 COMPLETE CBC W/AUTO DIFF WBC: CPT

## 2022-05-19 PROCEDURE — 80053 COMPREHEN METABOLIC PANEL: CPT

## 2022-05-19 RX ADMIN — PANTOPRAZOLE SODIUM 40 MG: 40 TABLET, DELAYED RELEASE ORAL at 09:37

## 2022-05-19 RX ADMIN — CHLORPROMAZINE HYDROCHLORIDE 25 MG: 25 TABLET, SUGAR COATED ORAL at 15:31

## 2022-05-19 RX ADMIN — CLOPIDOGREL BISULFATE 75 MG: 75 TABLET ORAL at 09:36

## 2022-05-19 RX ADMIN — ENOXAPARIN SODIUM 90 MG: 100 INJECTION SUBCUTANEOUS at 09:36

## 2022-05-19 RX ADMIN — SODIUM CHLORIDE, PRESERVATIVE FREE 2000 MG: 5 INJECTION INTRAVENOUS at 05:19

## 2022-05-19 RX ADMIN — BARICITINIB 4 MG: 2 TABLET, FILM COATED ORAL at 09:36

## 2022-05-19 RX ADMIN — DEXAMETHASONE SODIUM PHOSPHATE 6 MG: 10 INJECTION, SOLUTION INTRAMUSCULAR; INTRAVENOUS at 09:36

## 2022-05-19 RX ADMIN — INSULIN LISPRO 2 UNITS: 100 INJECTION, SOLUTION INTRAVENOUS; SUBCUTANEOUS at 09:20

## 2022-05-19 RX ADMIN — INSULIN LISPRO 6 UNITS: 100 INJECTION, SOLUTION INTRAVENOUS; SUBCUTANEOUS at 12:24

## 2022-05-19 RX ADMIN — ZINC SULFATE 220 MG (50 MG) CAPSULE 50 MG: CAPSULE at 09:37

## 2022-05-19 RX ADMIN — CHLORPROMAZINE HYDROCHLORIDE 25 MG: 25 TABLET, SUGAR COATED ORAL at 09:37

## 2022-05-19 RX ADMIN — SODIUM CHLORIDE, PRESERVATIVE FREE 10 ML: 5 INJECTION INTRAVENOUS at 09:51

## 2022-05-19 RX ADMIN — SODIUM CHLORIDE: 9 INJECTION, SOLUTION INTRAVENOUS at 05:17

## 2022-05-19 RX ADMIN — ALBUTEROL SULFATE 2 PUFF: 90 AEROSOL, METERED RESPIRATORY (INHALATION) at 21:00

## 2022-05-19 RX ADMIN — INSULIN LISPRO 2 UNITS: 100 INJECTION, SOLUTION INTRAVENOUS; SUBCUTANEOUS at 12:23

## 2022-05-19 RX ADMIN — CARVEDILOL 12.5 MG: 12.5 TABLET, FILM COATED ORAL at 09:37

## 2022-05-19 RX ADMIN — INSULIN LISPRO 6 UNITS: 100 INJECTION, SOLUTION INTRAVENOUS; SUBCUTANEOUS at 09:21

## 2022-05-19 RX ADMIN — CHLORPROMAZINE HYDROCHLORIDE 25 MG: 25 TABLET, SUGAR COATED ORAL at 20:39

## 2022-05-19 RX ADMIN — ALBUTEROL SULFATE 2 PUFF: 90 AEROSOL, METERED RESPIRATORY (INHALATION) at 12:22

## 2022-05-19 RX ADMIN — WATER 2000 MG: 1 INJECTION INTRAMUSCULAR; INTRAVENOUS; SUBCUTANEOUS at 15:32

## 2022-05-19 RX ADMIN — ATORVASTATIN CALCIUM 20 MG: 20 TABLET, FILM COATED ORAL at 09:37

## 2022-05-19 RX ADMIN — INSULIN LISPRO 6 UNITS: 100 INJECTION, SOLUTION INTRAVENOUS; SUBCUTANEOUS at 18:18

## 2022-05-19 RX ADMIN — OXYCODONE HYDROCHLORIDE AND ACETAMINOPHEN 1000 MG: 500 TABLET ORAL at 09:37

## 2022-05-19 RX ADMIN — INSULIN LISPRO 2 UNITS: 100 INJECTION, SOLUTION INTRAVENOUS; SUBCUTANEOUS at 18:16

## 2022-05-19 RX ADMIN — BUDESONIDE AND FORMOTEROL FUMARATE DIHYDRATE 2 PUFF: 160; 4.5 AEROSOL RESPIRATORY (INHALATION) at 21:00

## 2022-05-19 RX ADMIN — CARVEDILOL 12.5 MG: 12.5 TABLET, FILM COATED ORAL at 15:31

## 2022-05-19 RX ADMIN — INSULIN GLARGINE 25 UNITS: 100 INJECTION, SOLUTION SUBCUTANEOUS at 20:48

## 2022-05-19 RX ADMIN — SODIUM CHLORIDE: 9 INJECTION, SOLUTION INTRAVENOUS at 17:15

## 2022-05-19 NOTE — PROGRESS NOTES
Tana Abdi received from 4th floor to room # [phone] . Mental Status: Patient is oriented, alert, coherent, logical, thought processes intact and able to concentrate and follow conversation. Vitals:    05/19/22 1530   BP: 136/67   Pulse: 70   Resp: 16   Temp: 97.1 °F (36.2 °C)   SpO2: 97%     Placed on cardiac monitor: Yes. Bedside monitor. Belongings: None with patient at bedside . Family at bedside No.  Oriented Patient to room. Call light within reach. Yes. Transfer was: Well tolerated by patient. .    Electronically signed by Abel Espinoza RN on 5/19/2022 at 4:40 PM

## 2022-05-19 NOTE — CONSULTS
INFECTIOUS DISEASES CONSULT NOTE    Patient:  Juliet Conley 77 y.o. male  ROOM # [unfilled]  YOB: 1945  MRN: [id number]  CSN:  [id number]  Admit date: 5/17/2022   Admitting Physician: Yony Chaudhry MD  Primary Care Physician: Handy Rollins MD  REFERRING PROVIDER: No ref. provider found    Reason for Consultation: Bacteremia    History of Present Illness/Chief Complaint: Pleasant 80-year-old man. He recently returned from the [facility]. He indicates on Saturday evening he felt little tired. Sunday he felt tired. He developed some mild cough. Monday he was tired, had some cough, and also dyspnea. He had had some chills and sweats. He presented to the hospital on the 17th. He has been afebrile subsequent to admission. He initially was on oxygen supplementation as high as 5 L with saturations in the low 90s. He is feeling much better at this point. He is now on room air. He has no pain or discomfort. He has no nausea or vomiting. He indicates he was a little bit nauseated early on on 2 occasions but that is resolved. He is tolerating oral intake. He has no urinary symptoms. He has had 2 COVID shots previously. His last was around June of last year. He has not had previous COVID infection. He had tested for positive for COVID in the emergency room. He had blood cultures show 1 of 4 bottles with staph species. He has been on empiric antibiotic treatment vancomycin and cefepime. He has been on baricitinib and steroids. Infectious disease asked to evaluate and offer recommendations. He initially had had high lactate level which has normalized. He had been feeling fine prior to the symptoms that developed over the past few days now seem to be improved. He was not having any subacute illness symptoms.     Current Scheduled Medications:    cefepime  2,000 mg IntraVENous Q8H    baricitinib  4 mg Oral Daily    chlorproMAZINE  25 mg Oral TID    zinc sulfate  50 mg Oral Daily    vitamin C  1,000 mg Oral Daily    vitamin D  50,000 Units Oral Weekly    insulin glargine  25 Units SubCUTAneous Nightly    insulin lispro  6 Units SubCUTAneous TID WC    dexamethasone  6 mg IntraVENous Daily    vancomycin (VANCOCIN) intermittent dosing (placeholder)   Other RX Placeholder    vancomycin  1,500 mg IntraVENous Q24H    atorvastatin  20 mg Oral Daily    carvedilol  12.5 mg Oral BID WC    clopidogrel  75 mg Oral Daily    [Held by provider] lisinopril  40 mg Oral Daily    pantoprazole  40 mg Oral Daily    sodium chloride flush  5-40 mL IntraVENous 2 times per day    albuterol sulfate HFA  2 puff Inhalation 4x daily    budesonide-formoterol  2 puff Inhalation BID    insulin lispro  0-12 Units SubCUTAneous TID WC    insulin lispro  0-6 Units SubCUTAneous Nightly    enoxaparin  1 mg/kg SubCUTAneous BID     Current PRN Medications:  glucose, dextrose bolus **OR** dextrose bolus, glucagon (rDNA), dextrose, sodium chloride flush, sodium chloride, ondansetron **OR** ondansetron, polyethylene glycol, acetaminophen **OR** acetaminophen, potassium chloride **OR** potassium alternative oral replacement **OR** potassium chloride, magnesium sulfate    Allergies:  No Known Allergies    Past Medical History: Coronary artery disease. Atrial fibrillation. Pacemaker placement. Hypertension. Hyperlipidemia. Mitral regurgitation. Ischemic cardiomyopathy. Past Surgical History: History of mitral valve repair with ring annuloplasty. Coronary artery bypass grafting. Torrez-Maze procedure. Pacemaker placement. Social History: Single. Previous tobacco use. Some alcohol use. No illicit drug use. Family History: Lung cancer. Coronary artery disease. Exposure History: Recent travel to [facility]: See HPI. No anginal type chest pain.     Vital Signs:  BP (!) 147/72   Pulse 73   Temp 97.2 °F (36.2 °C) (Temporal)   Resp 17   Ht 5' 10\" (1.778 m)   Wt 200 lb 12.8 oz (91.1 kg)   SpO2 97%   BMI 28.81 kg/m²  Temp (24hrs), Av.9 °F (36.6 °C), Min:97.2 °F (36.2 °C), Max:98.4 °F (36.9 °C)    Physical Exam:   Vital signs reviewed. Lab Results:  CBC:   Recent Labs     22  0206 22  0404 22  0503   WBC 11.5* 11.1* 10.2   HGB 13.7* 12.0* 11.3*   HCT 43.5 36.2* 34.8*    207 205   LYMPHOPCT 29.6 11.0* 13.6*   MONOPCT 7.9 4.8 6.5     CMP:   Recent Labs     22  02022  0206 22  0212 22  0222 22  0404 22  0503     --   --   --  136 136   K 4.1  --  4.1  --  4.0 4.5   CL 99  --   --   --  104 105   CO2 14*  --   --   --  19* 21*   BUN 14  --   --   --  29* 26*   CREATININE 1.5*  --   --   --  1.1 0.8   CALCIUM 9.4  --   --   --  9.1 8.6*   BILITOT 1.0  --   --   --  0.5 0.3   ALKPHOS 55  --   --   --  39* 35*   ALT 48*  --   --   --  52* 47*   AST 65*  --   --   --  56* 37   GLUCOSE 305*   < >  --  284 204* 217*    < > = values in this interval not displayed. Troponin May 17, 2022-0.03  BNP 1141  Lactic acid initially 10. 6-today normal  D-dimer 1.4  C-reactive protein 3.1  Procalcitonin 0.12    Ferritin 479  Hemoglobin A1c 7.3  CRP yesterday 4.4    CRP today 1.5  Procalcitonin today 5.5    Respiratory panel May 17, 2022:   Ref Range & Units 22 0206   Adenovirus by PCR Not Detected Not Detected    Bordetella parapertussis by PCR Not Detected Not Detected    Bordetella pertussis by PCR Not Detected Not Detected    Chlamydophilia pneumoniae by PCR Not Detected Not Detected    Coronavirus 229E by PCR Not Detected Not Detected    Coronavirus HKU1 by PCR Not Detected Not Detected    Coronavirus NL63 by PCR Not Detected Not Detected    Coronavirus OC43 by PCR Not Detected Not Detected    SARS-CoV-2, PCR Not Detected DETECTED Panic     Human Metapneumovirus by PCR Not Detected Not Detected    Human Rhinovirus/Enterovirus by PCR Not Detected Not Detected    Influenza A by PCR Not Detected Not Detected Influenza B by PCR Not Detected Not Detected    Mycoplasma pneumoniae by PCR Not Detected Not Detected    Parainfluenza Virus 1 by PCR Not Detected Not Detected    Parainfluenza Virus 2 by PCR Not Detected Not Detected    Parainfluenza Virus 3 by PCR Not Detected Not Detected    Parainfluenza Virus 4 by PCR Not Detected Not Detected    Respiratory Syncytial Virus by PCR Not Detected Not Detected      Culture:   Blood cultures yesterday-1 of 4 bottles Staphylococcus species-suspect may be contaminant  Blood cultures today no growth to date    Radiology:     CT abdomen and pelvis May 17, 2022:  Impression   1. Tiny gallstones with no sign of cholecystitis and no biliary   dilation. 2. Large amount of fecal material within the rectum. 3. No mass, fluid collection, or inflammatory process within the   abdomen or pelvis.       Signed by Dr Chandler Coughlin chest May 17, 2022:  Impression   1. No evidence of pulmonary embolism. No aortic aneurysm or   dissection. Severe atheromatous changes of coronary arteries. Evidence   of a prior CABG. 2. Pulmonary congestion or pulmonary edema. The lungs are suboptimally   evaluated due to respiratory motion artifacts. 3. Small bibasal pleural effusion. Above study was initially reviewed and reported by stat rads. I do not   find any discrepancies. Signed by Dr Isaias Leon     Chest x-ray May 17, 2022:  Impression   1. Moderate pulmonary vascular congestion. Signed by Dr Isaias Leon         Additional Studies Reviewed:     Impression:   1. COVID-19 pneumonia  2. Positive blood culture-suspect may be contaminant  3. History of coronary artery disease  4. History of valvular heart disease with mitral valvuloplasty    Recommendations:    Would discontinue antibiotic treatment  Complete 5-day course of steroids  Discontinue baricitinib  Monitor off antibiotic treatment  Repeat lab in a.m.   If stable/ongoing improvement by tomorrow-would be okay with me for discharge home  Continue to follow    Kristen Flores MD  05/19/22  2:15 PM

## 2022-05-19 NOTE — PROGRESS NOTES
Pharmacy Renal Adjustment    Izzy Dempsey is a 77 y.o. male. Pharmacy has renally adjusted medications per protocol. Recent Labs     05/18/22  0404 05/19/22  0503   BUN 29* 26*       Recent Labs     05/18/22  0404 05/19/22  0503   CREATININE 1.1 0.8       Estimated Creatinine Clearance: 91 mL/min (based on SCr of 0.8 mg/dL). Height:   Ht Readings from Last 1 Encounters:   05/17/22 5' 10\" (1.778 m)     Weight:  Wt Readings from Last 1 Encounters:   05/17/22 200 lb 12.8 oz (91.1 kg)       Plan: Adjust the following medications based on renal function:           Cefepime 2 gm IV every 12 hours standard infusion adjusted to Cefepime 2 gm IV every 8 hours standard infusion for six days.      Electronically signed by Radha Block [facility] on 5/19/2022 at 1:45 PM

## 2022-05-19 NOTE — PROGRESS NOTES
[facility] Progress Note    Patient:  Tiffany Chambers  YOB: 1945  Date of Service: 5/19/2022  MRN: [id number]   Acct: [de-identified]   Primary Care Physician: Silvia Veronica MD  Advance Directive: DNR  Admit Date: 5/17/2022       Hospital Day: 2        CHIEF COMPLAINT:     Chief Complaint   Patient presents with    Chest Pain    Shortness of Breath       5/19/2022 1:51 PM  Subjective / Interval History:   05/19/2022  Patient seen and examined. Doing well. No new complaints. No acute changes or acute overnight event reported. Frequency of hiccups improved. Patient denies any acute complaints or distress at this time. No fever, chills, no dizziness, lightheadedness or palpitations. Blood cultures now growing gram-positive.      05/18/2022  No acute changes or acute overnight event reported. Shortness of breath improved. Patient denies any active chest pain at this time. No fever or chills. Sitting comfortably in bed in no apparent acute distress. Patient denies any acute complaints or distress at this time. Review of Systems:   Review of Systems  ROS: 14 point review of systems is negative except as specifically addressed above. ADULT DIET;  Regular; Low Fat/Low Chol/High Fiber/KAILEE; Safety Tray; Safety Tray (Disposables)    Intake/Output Summary (Last 24 hours) at 5/19/2022 1351  Last data filed at 5/19/2022 1100  Gross per 24 hour   Intake 1240 ml   Output --   Net 1240 ml       Medications:   dextrose      sodium chloride 100 mL/hr at 05/19/22 0517    sodium chloride 100 mL/hr at 05/19/22 0700     Current Facility-Administered Medications   Medication Dose Route Frequency Provider Last Rate Last Admin    ceFEPIme (MAXIPIME) 2,000 mg in sterile water 20 mL IV syringe  2,000 mg IntraVENous Q8H Meryl Alcocer MD        baricitinib (OLUMIANT) tablet 4 mg  4 mg Oral Daily Cheyenne Ibarra MD   4 mg at 05/19/22 0936    chlorproMAZINE (THORAZINE) tablet 25 mg  25 mg Oral TID Carolina Cleveland MD   25 mg at 05/19/22 9209    zinc sulfate (ZINCATE) capsule 50 mg  50 mg Oral Daily Carolina Cleveland MD   50 mg at 05/19/22 1399    ascorbic acid (VITAMIN C) tablet 1,000 mg  1,000 mg Oral Daily Carolina Cleveland MD   1,000 mg at 05/19/22 0358    vitamin D (ERGOCALCIFEROL) capsule 50,000 Units  50,000 Units Oral Weekly Carolina Cleveland MD   50,000 Units at 05/18/22 1249    glucose chewable tablet 16 g  4 tablet Oral PRN Carolina Cleveland MD        dextrose bolus 10% 125 mL  125 mL IntraVENous PRN Carolina Cleveland MD        Or    dextrose bolus 10% 250 mL  250 mL IntraVENous PRN Carolina Cleveland MD        glucagon (rDNA) injection 1 mg  1 mg IntraMUSCular PRN Carolina Cleveland MD        dextrose 5 % solution  100 mL/hr IntraVENous PRN Carolina Cleveland MD        insulin glargine (LANTUS) injection vial 25 Units  25 Units SubCUTAneous Nightly Carolina Cleveland MD   25 Units at 05/18/22 2115    insulin lispro (HUMALOG) injection vial 6 Units  6 Units SubCUTAneous TID WC Carolina Cleveland MD   6 Units at 05/19/22 1224    0.9 % sodium chloride infusion   IntraVENous Continuous Carolina Cleveland  mL/hr at 05/19/22 0517 New Bag at 05/19/22 0517    dexamethasone (PF) (DECADRON) injection 6 mg  6 mg IntraVENous Daily Carolina Cleveland MD   6 mg at 05/19/22 7591    vancomycin (VANCOCIN) intermittent dosing (placeholder)   Other RX Placeholder Carolina Cleveland MD        vancomycin (VANCOCIN) 1,500 mg in dextrose 5 % 500 mL IVPB  1,500 mg IntraVENous Q24H Carolina Cleveland MD        atorvastatin (LIPITOR) tablet 20 mg  20 mg Oral Daily Ingrid Chirinos MD   20 mg at 05/19/22 7220    carvedilol (COREG) tablet 12.5 mg  12.5 mg Oral BID  Ingrid Chirinos MD   12.5 mg at 05/19/22 0937    clopidogrel (PLAVIX) tablet 75 mg  75 mg Oral Daily Ingrid Chirinos MD   75 mg at 05/19/22 0936    [Held by provider] lisinopril (PRINIVIL;ZESTRIL) tablet 40 mg  40 mg Oral Daily Serene Setting, MD   40 mg at 05/18/22 0753    pantoprazole (PROTONIX) tablet 40 mg  40 mg Oral Daily Serene Setting, MD   40 mg at 05/19/22 3798    sodium chloride flush 0.9 % injection 5-40 mL  5-40 mL IntraVENous 2 times per day Serene Setting, MD   10 mL at 05/19/22 0951    sodium chloride flush 0.9 % injection 5-40 mL  5-40 mL IntraVENous PRN Serene Setting, MD   10 mL at 05/17/22 0559    0.9 % sodium chloride infusion   IntraVENous PRN Serene Setting,  mL/hr at 05/19/22 0700 Rate Verify at 05/19/22 0700    ondansetron (ZOFRAN-ODT) disintegrating tablet 4 mg  4 mg Oral Q8H PRN Serene Setting, MD        Or    ondansetron [facility]) injection 4 mg  4 mg IntraVENous Q6H PRN Serene Setting, MD        polyethylene glycol (GLYCOLAX) packet 17 g  17 g Oral Daily PRN Serene Setting, MD        acetaminophen (TYLENOL) tablet 650 mg  650 mg Oral Q6H PRN Serene Setting, MD        Or   Yasmine Me acetaminophen (TYLENOL) suppository 650 mg  650 mg Rectal Q6H PRN Serene Setting, MD        potassium chloride (KLOR-CON M) extended release tablet 40 mEq  40 mEq Oral PRN Serene Setting, MD        Or    potassium bicarb-citric acid (EFFER-K) effervescent tablet 40 mEq  40 mEq Oral PRN Serene Setting, MD        Or    potassium chloride 10 mEq/100 mL IVPB (Peripheral Line)  10 mEq IntraVENous PRN Serene Setting, MD        magnesium sulfate 2000 mg in 50 mL IVPB premix  2,000 mg IntraVENous PRN Serene Setting, MD        albuterol sulfate  (90 Base) MCG/ACT inhaler 2 puff  2 puff Inhalation 4x daily Serene Setting, MD   2 puff at 05/19/22 1222    budesonide-formoterol (SYMBICORT) 160-4.5 MCG/ACT inhaler 2 puff  2 puff Inhalation BID Serene Setting, MD   2 puff at 05/18/22 8746    insulin lispro (HUMALOG) injection vial 0-12 Units  0-12 Units SubCUTAneous TID WC Serene Setting, MD   2 Units at 05/19/22 1223    insulin lispro (HUMALOG) injection vial 0-6 Units  0-6 Units SubCUTAneous Nightly Nicola Cardenas MD   3 Units at 05/18/22 2139    enoxaparin (LOVENOX) injection 90 mg  1 mg/kg SubCUTAneous BID Liset Metcalf MD   90 mg at 05/19/22 9482         dextrose      sodium chloride 100 mL/hr at 05/19/22 7353    sodium chloride 100 mL/hr at 05/19/22 0700      cefepime  2,000 mg IntraVENous Q8H    baricitinib  4 mg Oral Daily    chlorproMAZINE  25 mg Oral TID    zinc sulfate  50 mg Oral Daily    vitamin C  1,000 mg Oral Daily    vitamin D  50,000 Units Oral Weekly    insulin glargine  25 Units SubCUTAneous Nightly    insulin lispro  6 Units SubCUTAneous TID WC    dexamethasone  6 mg IntraVENous Daily    vancomycin (VANCOCIN) intermittent dosing (placeholder)   Other RX Placeholder    vancomycin  1,500 mg IntraVENous Q24H    atorvastatin  20 mg Oral Daily    carvedilol  12.5 mg Oral BID WC    clopidogrel  75 mg Oral Daily    [Held by provider] lisinopril  40 mg Oral Daily    pantoprazole  40 mg Oral Daily    sodium chloride flush  5-40 mL IntraVENous 2 times per day    albuterol sulfate HFA  2 puff Inhalation 4x daily    budesonide-formoterol  2 puff Inhalation BID    insulin lispro  0-12 Units SubCUTAneous TID WC    insulin lispro  0-6 Units SubCUTAneous Nightly    enoxaparin  1 mg/kg SubCUTAneous BID     glucose, dextrose bolus **OR** dextrose bolus, glucagon (rDNA), dextrose, sodium chloride flush, sodium chloride, ondansetron **OR** ondansetron, polyethylene glycol, acetaminophen **OR** acetaminophen, potassium chloride **OR** potassium alternative oral replacement **OR** potassium chloride, magnesium sulfate  ADULT DIET;  Regular; Low Fat/Low Chol/High Fiber/KAILEE; Safety Tray; Safety Tray (Disposables)       Labs:   CBC with DIFF:  Recent Labs     05/17/22  0206 05/18/22  0404 05/19/22  0503   WBC 11.5* 11.1* 10.2   RBC 4.41* 3.84* 3.57*   HGB 13.7* 12.0* 11.3*   HCT 43.5 36.2* 34.8*   MCV 98.6* 94.3* 97.5*   MCH 31.1* 31.3* 31.7*   MCHC 31.5* 33.1 32.5*   RDW 13.4 13.3 13.1    207 205   MPV 9.8 10.5 10.5   NEUTOPHILPCT 60.1 83.6* 79.4*   LYMPHOPCT 29.6 11.0* 13.6*   MONOPCT 7.9 4.8 6.5   EOSRELPCT 1.0 0.0 0.0   BASOPCT 0.4 0.1 0.0   NEUTROABS 6.9 9.2* 8.1*   LYMPHSABS 3.4 1.2 1.4   MONOSABS 0.90 0.50 0.70   EOSABS 0.10 0.00 0.00   BASOSABS 0.10 0.00 0.00       CMP/BMP:  Recent Labs     05/17/22 0206 05/17/22 0206 05/17/22  0212 05/17/22 0222 05/18/22  0404 05/19/22  0503     --   --   --  136 136   K 4.1  --  4.1  --  4.0 4.5   CL 99  --   --   --  104 105   CO2 14*  --   --   --  19* 21*   ANIONGAP 24*  --   --   --  13 10   GLUCOSE 305*   < >  --  284 204* 217*   BUN 14  --   --   --  29* 26*   CREATININE 1.5*  --   --   --  1.1 0.8   LABGLOM 46*  --   --   --  >60 >60   CALCIUM 9.4  --   --   --  9.1 8.6*   PROT 7.1  --   --   --  6.7 5.8*   LABALBU 4.3  --   --   --  3.5 3.4*   BILITOT 1.0  --   --   --  0.5 0.3   ALKPHOS 55  --   --   --  39* 35*   ALT 48*  --   --   --  52* 47*   AST 65*  --   --   --  56* 37    < > = values in this interval not displayed. CRP:    Recent Labs     05/17/22 0206 05/18/22  0404 05/19/22  0503   CRP 3.12* 4.44* 1.57*     Sed Rate:  No results for input(s): SEDRATE in the last 72 hours. HgBA1c:  No components found for: HGBA1C  FLP:    Lab Results   Component Value Date    TRIG 99 04/07/2021    HDL 44 04/07/2021    LDLCALC 47 04/07/2021    LDLDIRECT 83 03/17/2014     TSH:    Lab Results   Component Value Date    TSH 1.95 03/16/2014     Troponin T:   Recent Labs     05/17/22 0206   TROPONINI 0.03     Pro-BNP: No results for input(s): BNP in the last 72 hours.   INR:   Recent Labs     05/17/22 0206   INR 1.23*     ABGs:   Lab Results   Component Value Date    PHART 7.280 05/17/2022    PO2ART 107.0 05/17/2022    KVE2EBD 24.0 05/17/2022     UA:  Recent Labs     05/17/22  0439   COLORU YELLOW   PHUR 5.0   CLARITYU Clear   SPECGRAV 1.016   LEUKOCYTESUR Negative   UROBILINOGEN 0.2   BILIRUBINUR Negative   BLOODU Negative   GLUCOSEU 100*         Culture Results:    No results for input(s): CXSURG in the last 720 hours. Blood Culture Recent: No results for input(s): BC in the last 720 hours. Recent Labs     05/18/22  1702   BLOODCULT2  Bottle volume = 10 ml  Gram stain Aerobic bottle:  Gram positive cocci in clusters  resembling Staphylococcus  Culture in progress  Please notify Physician  Gram stain Anaerobic bottle:  Gram positive cocci in clusters  resembling Staphylococcus  Culture in progress  Please notify Physician  *             Cultures:   No results for input(s): CULTURE in the last 72 hours. Recent Labs     05/18/22  1702   BLOODCULT2  Bottle volume = 10 ml  Gram stain Aerobic bottle:  Gram positive cocci in clusters  resembling Staphylococcus  Culture in progress  Please notify Physician  Gram stain Anaerobic bottle:  Gram positive cocci in clusters  resembling Staphylococcus  Culture in progress  Please notify Physician  *     No results for input(s): CXSURG in the last 72 hours. No results for input(s): MG, PHOS in the last 72 hours. Recent Labs     05/17/22  0206 05/18/22  0404 05/19/22  0503   AST 65* 56* 37   ALT 48* 52* 47*   BILITOT 1.0 0.5 0.3   ALKPHOS 55 39* 35*         RAD:   CT ABDOMEN PELVIS W IV CONTRAST Additional Contrast? None    Result Date: 5/17/2022  CT ABDOMEN PELVIS W IV CONTRAST 5/17/2022 7:57 AM History: Chest tightness and shortness of breath. Lactic acidosis. History of coronary artery disease and prior CABG. Initial O2 saturation = 82%. Tachycardia. Tachypnea. In order to have a CT radiation dose as low as reasonably achievable Automated Exposure Control was utilized for adjustment of the mA and/or KV according to patient size. DLP in mGycm= 1400. Abdomen/pelvis CT with IV contrast. Axial, sagittal, and coronal sequences. A preliminary [facility] report was transmitted to the appropriate department immediately after this study was interpreted.  Heart size is normal. Bibasilar interstitial prominence with trace amounts of pleural fluid. Diffuse fatty liver. Tiny gallstones with no sign of cholecystitis or bile duct dilation. Normal pancreas. Normal spleen. Normal and symmetric adrenal glands and kidneys. No hydronephrosis or pyelonephritis. Aortic calcification with no aneurysm. No bowel dilation. No appendicitis. No diverticulitis or colitis. Large amount of fecal material within the rectum. 1. Tiny gallstones with no sign of cholecystitis and no biliary dilation. 2. Large amount of fecal material within the rectum. 3. No mass, fluid collection, or inflammatory process within the abdomen or pelvis. Signed by Dr Nancy Davidson    XR CHEST PORTABLE    Result Date: 5/17/2022  Examination. XR CHEST PORTABLE 5/17/2022 2:17 AM History: Shortness of breath. Frontal portable upright view of the chest is obtained and compared with the previous study dated 11/18/2016. The lungs are well-expanded. There is moderate pulmonary vascular congestion. There is no pleural effusion. No areas of focal consolidation. No pneumothorax. The heart size is not evaluated due to the portable projection. There is evidence of previous cardiac surgery and valvular replacement. A dual-chamber cardiac pacer is in place. There is no acute bony abnormality. 1. Moderate pulmonary vascular congestion. Signed by Dr Hilario Selby    Result Date: 5/17/2022  Examination. CTA PULMONARY W CONTRAST 5/17/2022 2:47 AM History: Shortness of breath and elevated d-dimer. Hypoxia. DLP: 674 mGycm. The automated exposure control was utilized to minimize the patient's radiation exposure. The CT angiography of the chest is performed after contrast enhancement. The images are acquired in axial plane and subsequent 2-D reconstruction in coronal and sagittal planes. There is no previous similar study for comparison. The correlation made with chest radiograph obtained earlier today. There is normal opacification of pulmonary arteries and branches bilaterally. No filling defects in the opacified pulmonary arterial bed. The RV/LV ratio is 49/56 which is normal. Atheromatous changes thoracic aorta are seen. No aneurysmal dilatation or dissection. Severe atheromatous changes of coronary arteries are seen. There is evidence of a prior CABG. A few nonspecific mediastinal and hilar lymph nodes are seen. The limited visualized soft tissues of the neck is unremarkable. The lungs are poorly expanded. There are extensive respiratory motion artifacts which limits the diagnostic yield of the study. There are interlobular septal thickening there are scattered groundglass opacity which may represent pulmonary congestion/pulmonary edema. There is a small bibasal pleural effusion. The visualized tracheobronchial structures are patent. The liver and spleen are suboptimally evaluated due to extensive streak artifacts. The gallbladder is incompletely visualized and not evaluated evaluated. The adrenal glands bilaterally are normal. The pancreas and kidneys are incompletely included in the study and not evaluated. The abdomen is separately reviewed and reported. The images reviewed in bone window show chronic degenerative changes of the thoracic spine. No acute bony abnormalities noted. 1. No evidence of pulmonary embolism. No aortic aneurysm or dissection. Severe atheromatous changes of coronary arteries. Evidence of a prior CABG. 2. Pulmonary congestion or pulmonary edema. The lungs are suboptimally evaluated due to respiratory motion artifacts. 3. Small bibasal pleural effusion. Above study was initially reviewed and reported by [facility]. I do not find any discrepancies.  Signed by Dr Salina Schilder      Objective:   Vitals:   BP (!) 147/72   Pulse 73   Temp 97.2 °F (36.2 °C) (Temporal)   Resp 17   Ht 5' 10\" (1.778 m)   Wt 200 lb 12.8 oz (91.1 kg)   SpO2 97%   BMI 28.81 kg/m²       Patient Vitals for the past 24 hrs:   BP Temp Temp src Pulse Resp SpO2   05/19/22 1147 (!) 147/72 97.2 °F (36.2 °C) Temporal 73 17 97 %   05/19/22 0550 126/75 98.2 °F (36.8 °C) Temporal 79 18 98 %   05/18/22 2322 116/63 98.4 °F (36.9 °C) -- 85 18 97 %   05/18/22 1819 -- -- -- 91 -- --   05/18/22 1756 -- -- -- 100 -- --   05/18/22 1653 (!) 146/75 97.9 °F (36.6 °C) Tympanic 90 19 97 %   05/18/22 1650 (!) 146/75 -- -- 90 -- --       24HR INTAKE/OUTPUT:      Intake/Output Summary (Last 24 hours) at 5/19/2022 1351  Last data filed at 5/19/2022 1100  Gross per 24 hour   Intake 1240 ml   Output --   Net 1240 ml       Physical Exam  Vitals and nursing note reviewed. Constitutional:       General: He is not in acute distress. Appearance: Normal appearance. He is not ill-appearing, toxic-appearing or diaphoretic. HENT:      Head: Normocephalic and atraumatic. Right Ear: External ear normal.      Left Ear: External ear normal.      Nose: Nose normal. No congestion or rhinorrhea. Mouth/Throat:      Pharynx: No oropharyngeal exudate or posterior oropharyngeal erythema. Eyes:      General: No scleral icterus. Right eye: No discharge. Left eye: No discharge. Extraocular Movements: Extraocular movements intact. Conjunctiva/sclera: Conjunctivae normal.   Pulmonary:      Effort: Pulmonary effort is normal. No respiratory distress. Abdominal:      General: Bowel sounds are normal. There is no distension. Palpations: Abdomen is soft. Musculoskeletal:         General: No swelling, tenderness, deformity or signs of injury. Normal range of motion. Cervical back: Normal range of motion and neck supple. No rigidity. No muscular tenderness. Right lower leg: No edema. Left lower leg: No edema. Skin:     General: Skin is warm and dry. Coloration: Skin is not jaundiced or pale. Findings: No bruising, erythema, lesion or rash. Neurological:      General: No focal deficit present. Mental Status: He is alert and oriented to person, place, and time. Cranial Nerves: No cranial nerve deficit. Motor: No weakness. Coordination: Coordination normal.   Psychiatric:         Mood and Affect: Mood normal.         Behavior: Behavior normal.         Thought Content: Thought content normal.         Judgment: Judgment normal.           Assessment/plan:       Hospital Problems           Last Modified POA    * (Principal) Acute hypoxemic respiratory failure due to COVID-19 ([address] 5/17/2022 Yes    Paroxysmal atrial fibrillation with rapid ventricular response ([address] 5/17/2022 Yes    Overview Signed 3/20/2014  2:43 PM by Rosalia Greshamman     s/p cardioversion         Type 2 diabetes mellitus with hyperglycemia ([address] 5/18/2022 Yes    Overview Signed 5/18/2022 11:56 AM by Naomy Reeves MD     DMT2 - newly diagnosed  · Hemoglobin A1c: 7.3% (05/17/2022)  · Metformin 1000 mg p.o. twice daily (start date: 05/18/2022)         Bacteremia due to Gram-positive bacteria 5/19/2022 Yes    Pacemaker 5/17/2022 Yes    Essential hypertension 5/17/2022 Yes    CAD (coronary artery disease) 5/17/2022 Yes    Mixed hyperlipidemia 5/17/2022 Yes    Nonischemic cardiomyopathy ([address] 5/17/2022 Yes    Overview Signed 11/2/2021 12:57 PM by Yash Chambers DO     Severe LV systolic dysfunction, ejection fraction 15-20% by cath, 2014               Principal Problem:    Acute hypoxemic respiratory failure due to COVID-19 [facility])  Active Problems:    Paroxysmal atrial fibrillation with rapid ventricular response (HCC)    Type 2 diabetes mellitus with hyperglycemia (HCC)    Bacteremia due to Gram-positive bacteria    Pacemaker    Essential hypertension    CAD (coronary artery disease)    Mixed hyperlipidemia    Nonischemic cardiomyopathy ([address]  Resolved Problems:    * No resolved hospital problems.  *        Brief Summary  Mr Mariam Chin, a 77 y.o. male with cardiovascular disease, history of A. fib s/p cardioversion, history of heart block s/p pacemaker, presented to the ED with worsening shortness of breath with associated cough over the past 2 days, noting some chest tightness difficulty taking a deep breath. He was significantly tachypneic on arrival and hypoxic with SPO2 82% and screened positive for COVID-19. Due to his increased work of breathing he was placed on BiPAP. He appeared to initially be in SVT with heart rate up to 160, however after given dose of Cardizem his heart rate came down some and was in sinus tachycardia. His tachypnea and work of breathing dramatically improved on BiPAP and was weaned to nasal cannula. Chest x-ray Moderate pulmonary vascular congestion    CTA Chest: No evidence of pulmonary embolism. No aortic aneurysm or  dissection. Severe atheromatous changes of coronary arteries. Evidence of a prior CABG. Pulmonary congestion or pulmonary edema. The lungs are suboptimally evaluated due to respiratory motion artifacts. . Small bibasal pleural effusion. CT Abd/Pelvis: Tiny gallstones with no sign of cholecystitis and no biliary  dilation. Large amount of fecal material within the rectum. No mass, fluid collection, or inflammatory process within the abdomen or pelvis. Acute hypoxic respiratory failure due to COVID-19 infection  · Improved on BiPAP,   · Continue supplemental oxygen as needed   · Procalcitonin within lab reference range, and no obvious signs of bacterial infection  · Elevated CRP: 3.12 --> 4.44 (05/18/2022)  · Oxygen supplementation to keep SPO2 above 90%  · Continuous Pulse Ox  · Dexamethasone  · Albuterol and Ipratropium inhalers  · Adjunct therapy with; Famotidine, Melatonin, Vitamin C, D, Zinc, & Inhaled Corticosteroids. · Baricitinib (Olumiant) PO Daily-dosing as per pharmacy.   · Monitor LFTs and renal function   · Avoid Nebulizer treatments to avoid aerosolization.    · Encourage Prone positioning  · Enoxaparin subcu         Sepsis  Metabolic Acidosis Lactic Acidosis  Staph Bacteremia,   · Initial lactic acid of: 10.6 (05/17/2022). · Procalcitonin: 0.12 -->  7.85 --> 5.55 (05/19/2022)  · Lactic acid trended down: 10.6--> 2.8--> 2.3 --> 4.2 --> 1.7 (05/19/2022)  · UA Grossly unremarkable  · Broad-spectrum antibiotic (Vancomycin and Cefepime)  · Blood Culture (collected: 05/18/2022): Growing staphylococcal species. · Sensitivity pending  · Repeat blood cultures (05/19/2022)-pending  · IV fluids  · ID consult    CAD  · Resumed home regimen including; Plavix, Coreg, statin     Diabetes Mellitus II - newly diagnosed  · Hemoglobin A1c: 7.3% (05/17/2022)   Inpatient Regimens to include;  o - Insulin Glargine (Lantus) 25 units subcu nightly  o - Insulin Lispro (Humalog) 6 units subcu pre-meal 3 times a day  o - Insulin Lispro (Humalog) on a Medium dose sliding scale   Monitor POC glucose, and adjust insulin regimen accordingly based on daily insulin requirement.       Hypertension  · Continued home meds including lisinopril and Coreg,   · Monitor BP      Paroxysmal atrial fibrillation with RVR  · Currently in sinus rhythm  · Seen by Cardiology  · Follow-up with cardiology outpatient.     Vitamin D deficiency  · 25-hydroxy vitamin D 19.8 (05/18/2022)  · Vitamin D (ergocalciferol) 50,000 units p.o. weekly (start date: 05/18/2022)     Intractable Hiccups  · Chlorpromazine 25 mg p.o. 3 times daily  (start date: 05/18/2022)  · Discontinued metoclopramide, after discussion with patient about the interaction between metoclopramide and chlorpromazine.           Continue management of other chronic medical conditions - see above and orders. Advance Directive: DNR    ADULT DIET;  Regular; Low Fat/Low Chol/High Fiber/KAILEE; Safety Tray; Safety Tray (Disposables)         Consults Made:   IP CONSULT TO PHARMACY  PALLIATIVE CARE EVAL  IP CONSULT TO CARDIOLOGY  PHARMACY TO DOSE VANCOMYCIN  IP CONSULT TO INFECTIOUS DISEASES    DVT prophylaxis: Enoxaparin      Discharge planning  Continue current management as noted above    Time Spent is 35 mins in the examination, evaluation, counseling and review of medications, assessment and plan.      Electronically signed by   Ramsey Benavides MD, MPH, MD,   Internal Medicine Hospitalist   5/19/2022 1:51 PM

## 2022-05-20 VITALS
DIASTOLIC BLOOD PRESSURE: 62 MMHG | OXYGEN SATURATION: 99 % | HEART RATE: 72 BPM | TEMPERATURE: 97.6 F | SYSTOLIC BLOOD PRESSURE: 138 MMHG | WEIGHT: 200.8 LBS | BODY MASS INDEX: 28.75 KG/M2 | HEIGHT: 70 IN | RESPIRATION RATE: 18 BRPM

## 2022-05-20 LAB
ALBUMIN SERPL-MCNC: 3.4 G/DL (ref 3.5–5.2)
ALP BLD-CCNC: 38 U/L (ref 40–130)
ALT SERPL-CCNC: 87 U/L (ref 5–41)
ANION GAP SERPL CALCULATED.3IONS-SCNC: 12 MMOL/L (ref 7–19)
AST SERPL-CCNC: 64 U/L (ref 5–40)
BASOPHILS ABSOLUTE: 0 K/UL (ref 0–0.2)
BASOPHILS RELATIVE PERCENT: 0 % (ref 0–1)
BILIRUB SERPL-MCNC: 0.3 MG/DL (ref 0.2–1.2)
BUN BLDV-MCNC: 21 MG/DL (ref 8–23)
C-REACTIVE PROTEIN: 0.83 MG/DL (ref 0–0.5)
CALCIUM SERPL-MCNC: 8.6 MG/DL (ref 8.8–10.2)
CHLORIDE BLD-SCNC: 105 MMOL/L (ref 98–111)
CO2: 19 MMOL/L (ref 22–29)
CREAT SERPL-MCNC: 0.9 MG/DL (ref 0.5–1.2)
CULTURE, BLOOD 2: ABNORMAL
CULTURE, BLOOD 2: ABNORMAL
EOSINOPHILS ABSOLUTE: 0 K/UL (ref 0–0.6)
EOSINOPHILS RELATIVE PERCENT: 0 % (ref 0–5)
GFR AFRICAN AMERICAN: >59
GFR NON-AFRICAN AMERICAN: >60
GLUCOSE BLD-MCNC: 181 MG/DL (ref 70–99)
GLUCOSE BLD-MCNC: 201 MG/DL (ref 70–99)
GLUCOSE BLD-MCNC: 202 MG/DL (ref 74–109)
HCT VFR BLD CALC: 33.1 % (ref 42–52)
HEMOGLOBIN: 10.9 G/DL (ref 14–18)
IMMATURE GRANULOCYTES #: 0 K/UL
LYMPHOCYTES ABSOLUTE: 1 K/UL (ref 1.1–4.5)
LYMPHOCYTES RELATIVE PERCENT: 13.8 % (ref 20–40)
MCH RBC QN AUTO: 31.1 PG (ref 27–31)
MCHC RBC AUTO-ENTMCNC: 32.9 G/DL (ref 33–37)
MCV RBC AUTO: 94.6 FL (ref 80–94)
MONOCYTES ABSOLUTE: 0.5 K/UL (ref 0–0.9)
MONOCYTES RELATIVE PERCENT: 6.9 % (ref 0–10)
NEUTROPHILS ABSOLUTE: 5.7 K/UL (ref 1.5–7.5)
NEUTROPHILS RELATIVE PERCENT: 78.7 % (ref 50–65)
ORGANISM: ABNORMAL
PDW BLD-RTO: 13.2 % (ref 11.5–14.5)
PERFORMED ON: ABNORMAL
PERFORMED ON: ABNORMAL
PLATELET # BLD: 175 K/UL (ref 130–400)
PMV BLD AUTO: 10.7 FL (ref 9.4–12.4)
POTASSIUM REFLEX MAGNESIUM: 4.6 MMOL/L (ref 3.5–5)
PROCALCITONIN: 2.38 NG/ML (ref 0–0.09)
RBC # BLD: 3.5 M/UL (ref 4.7–6.1)
SODIUM BLD-SCNC: 136 MMOL/L (ref 136–145)
TOTAL PROTEIN: 5.7 G/DL (ref 6.6–8.7)
WBC # BLD: 7.2 K/UL (ref 4.8–10.8)

## 2022-05-20 PROCEDURE — 80053 COMPREHEN METABOLIC PANEL: CPT

## 2022-05-20 PROCEDURE — 86140 C-REACTIVE PROTEIN: CPT

## 2022-05-20 PROCEDURE — 2580000003 HC RX 258: Performed by: STUDENT IN AN ORGANIZED HEALTH CARE EDUCATION/TRAINING PROGRAM

## 2022-05-20 PROCEDURE — 6360000002 HC RX W HCPCS: Performed by: INTERNAL MEDICINE

## 2022-05-20 PROCEDURE — 82947 ASSAY GLUCOSE BLOOD QUANT: CPT

## 2022-05-20 PROCEDURE — 84145 PROCALCITONIN (PCT): CPT

## 2022-05-20 PROCEDURE — 36415 COLL VENOUS BLD VENIPUNCTURE: CPT

## 2022-05-20 PROCEDURE — 85025 COMPLETE CBC W/AUTO DIFF WBC: CPT

## 2022-05-20 PROCEDURE — 6370000000 HC RX 637 (ALT 250 FOR IP): Performed by: INTERNAL MEDICINE

## 2022-05-20 PROCEDURE — 6370000000 HC RX 637 (ALT 250 FOR IP): Performed by: STUDENT IN AN ORGANIZED HEALTH CARE EDUCATION/TRAINING PROGRAM

## 2022-05-20 RX ORDER — DEXAMETHASONE 6 MG/1
6 TABLET ORAL
Qty: 2 TABLET | Refills: 0 | Status: SHIPPED | OUTPATIENT
Start: 2022-05-21 | End: 2022-05-23

## 2022-05-20 RX ORDER — BUDESONIDE AND FORMOTEROL FUMARATE DIHYDRATE 160; 4.5 UG/1; UG/1
2 AEROSOL RESPIRATORY (INHALATION) 2 TIMES DAILY
Qty: 10.2 G | Refills: 3 | Status: SHIPPED | OUTPATIENT
Start: 2022-05-20

## 2022-05-20 RX ORDER — ALBUTEROL SULFATE 90 UG/1
2 AEROSOL, METERED RESPIRATORY (INHALATION) 4 TIMES DAILY
Qty: 18 G | Refills: 3 | Status: SHIPPED | OUTPATIENT
Start: 2022-05-20

## 2022-05-20 RX ORDER — ZINC SULFATE 50(220)MG
50 CAPSULE ORAL DAILY
Qty: 30 CAPSULE | Refills: 0 | Status: SHIPPED | OUTPATIENT
Start: 2022-05-21

## 2022-05-20 RX ORDER — ERGOCALCIFEROL 1.25 MG/1
50000 CAPSULE ORAL WEEKLY
Qty: 5 CAPSULE | Refills: 0 | Status: SHIPPED | OUTPATIENT
Start: 2022-05-25

## 2022-05-20 RX ADMIN — INSULIN LISPRO 6 UNITS: 100 INJECTION, SOLUTION INTRAVENOUS; SUBCUTANEOUS at 08:51

## 2022-05-20 RX ADMIN — DEXAMETHASONE SODIUM PHOSPHATE 6 MG: 10 INJECTION, SOLUTION INTRAMUSCULAR; INTRAVENOUS at 08:42

## 2022-05-20 RX ADMIN — INSULIN LISPRO 2 UNITS: 100 INJECTION, SOLUTION INTRAVENOUS; SUBCUTANEOUS at 08:42

## 2022-05-20 RX ADMIN — CHLORPROMAZINE HYDROCHLORIDE 25 MG: 25 TABLET, SUGAR COATED ORAL at 08:41

## 2022-05-20 RX ADMIN — OXYCODONE HYDROCHLORIDE AND ACETAMINOPHEN 1000 MG: 500 TABLET ORAL at 08:41

## 2022-05-20 RX ADMIN — ATORVASTATIN CALCIUM 20 MG: 20 TABLET, FILM COATED ORAL at 08:41

## 2022-05-20 RX ADMIN — CARVEDILOL 12.5 MG: 12.5 TABLET, FILM COATED ORAL at 08:41

## 2022-05-20 RX ADMIN — SODIUM CHLORIDE, PRESERVATIVE FREE 10 ML: 5 INJECTION INTRAVENOUS at 08:53

## 2022-05-20 RX ADMIN — ZINC SULFATE 220 MG (50 MG) CAPSULE 50 MG: CAPSULE at 08:41

## 2022-05-20 RX ADMIN — PANTOPRAZOLE SODIUM 40 MG: 40 TABLET, DELAYED RELEASE ORAL at 08:41

## 2022-05-20 RX ADMIN — CLOPIDOGREL BISULFATE 75 MG: 75 TABLET ORAL at 08:41

## 2022-05-20 NOTE — DISCHARGE SUMMARY
[place], [place], [address]  DEPARTMENT OF HOSPITALIST MEDICINE    DISCHARGE SUMMARY:        Pepe Brown  :    MRN:  [id number]    Admit date:  2022  Discharge date: 2022      Admitting Physician:  Ayla Wilson MD    Advance Directive: DNR    Consults Made:   PALLIATIVE CARE EVAL  IP CONSULT TO CARDIOLOGY  IP CONSULT TO INFECTIOUS DISEASES    Primary Care Physician:  Clara Estrella MD    Discharge Diagnoses:  Principal Problem:    Acute hypoxemic respiratory failure due to COVID-19 [facility])  Active Problems:    Paroxysmal atrial fibrillation with rapid ventricular response (HCC)    Type 2 diabetes mellitus with hyperglycemia (HCC)    Bacteremia due to Gram-positive bacteria    Pacemaker    Essential hypertension    CAD (coronary artery disease)    Mixed hyperlipidemia    Nonischemic cardiomyopathy (HCC)  Resolved Problems:    * No resolved hospital problems.  *          Pertinent Labs:  CBC with DIFF:  Recent Labs     22  0503 22  0119   WBC 11.1* 10.2 7.2   RBC 3.84* 3.57* 3.50*   HGB 12.0* 11.3* 10.9*   HCT 36.2* 34.8* 33.1*   MCV 94.3* 97.5* 94.6*   MCH 31.3* 31.7* 31.1*   MCHC 33.1 32.5* 32.9*   RDW 13.3 13.1 13.2    205 175   MPV 10.5 10.5 10.7   NEUTOPHILPCT 83.6* 79.4* 78.7*   LYMPHOPCT 11.0* 13.6* 13.8*   MONOPCT 4.8 6.5 6.9   EOSRELPCT 0.0 0.0 0.0   BASOPCT 0.1 0.0 0.0   NEUTROABS 9.2* 8.1* 5.7   LYMPHSABS 1.2 1.4 1.0*   MONOSABS 0.50 0.70 0.50   EOSABS 0.00 0.00 0.00   BASOSABS 0.00 0.00 0.00       CMP/BMP:  Recent Labs     22  0404 22  0503 22  0119    136 136   K 4.0 4.5 4.6    105 105   CO2 19* 21* 19*   ANIONGAP 13 10 12   GLUCOSE 204* 217* 202*   BUN 29* 26* 21   CREATININE 1.1 0.8 0.9   LABGLOM >60 >60 >60   CALCIUM 9.1 8.6* 8.6*   PROT 6.7 5.8* 5.7*   LABALBU 3.5 3.4* 3.4*   BILITOT 0.5 0.3 0.3   ALKPHOS 39* 35* 38*   ALT 52* 47* 87*   AST 56* 37 64*         CRP:    Recent Labs 05/18/22  0404 05/19/22  0503 05/20/22  0119   CRP 4.44* 1.57* 0.83*     Sed Rate:  No results for input(s): SEDRATE in the last 72 hours. HgBA1c:  No components found for: HGBA1C  FLP:    Lab Results   Component Value Date    TRIG 99 04/07/2021    HDL 44 04/07/2021    LDLCALC 47 04/07/2021    LDLDIRECT 83 03/17/2014     TSH:    Lab Results   Component Value Date    TSH 1.95 03/16/2014     Troponin T: No results for input(s): TROPONINI in the last 72 hours. Pro-BNP: No results for input(s): BNP in the last 72 hours. INR: No results for input(s): INR in the last 72 hours. ABGs:   Lab Results   Component Value Date    PHART 7.280 05/17/2022    PO2ART 107.0 05/17/2022    GAC5VSC 24.0 05/17/2022     UA:No results for input(s): NITRITE, COLORU, PHUR, LABCAST, WBCUA, RBCUA, MUCUS, TRICHOMONAS, YEAST, BACTERIA, CLARITYU, SPECGRAV, LEUKOCYTESUR, UROBILINOGEN, BILIRUBINUR, BLOODU, GLUCOSEU, AMORPHOUS in the last 72 hours. Invalid input(s): Doreen Anders      Culture Results:    No results for input(s): CXSURG in the last 720 hours. Blood Culture Recent:   Recent Labs     05/18/22  1641   BC No Growth to date. Any change in status will be called. Cultures:   No results for input(s): CULTURE in the last 72 hours. Recent Labs     05/18/22  1641 05/18/22  1702   BC No Growth to date. Any change in status will be called. --    BLOODCULT2  --   Bottle volume = 10 ml*  Isolated from Aerobic and Anaerobic bottle  No further workup  This organism was isolated from one set. Susceptibility testing is not routinely done as this  organism frequently represents skin contamination. Additional testing can be ordered by calling the  Microbiology Department. No results for input(s): CXSURG in the last 72 hours. No results for input(s): MG, PHOS in the last 72 hours.   Recent Labs     05/18/22  0404 05/19/22  0503 05/20/22  0119   AST 56* 37 64*   ALT 52* 47* 87*   BILITOT 0.5 0.3 0.3   ALKPHOS 39* 35* 38* Significant Diagnostic Studies:   CT ABDOMEN PELVIS W IV CONTRAST Additional Contrast? None    Result Date: 5/17/2022  CT ABDOMEN PELVIS W IV CONTRAST 5/17/2022 7:57 AM History: Chest tightness and shortness of breath. Lactic acidosis. History of coronary artery disease and prior CABG. Initial O2 saturation = 82%. Tachycardia. Tachypnea. In order to have a CT radiation dose as low as reasonably achievable Automated Exposure Control was utilized for adjustment of the mA and/or KV according to patient size. DLP in mGycm= 1400. Abdomen/pelvis CT with IV contrast. Axial, sagittal, and coronal sequences. A preliminary [facility] report was transmitted to the appropriate department immediately after this study was interpreted. Heart size is normal. Bibasilar interstitial prominence with trace amounts of pleural fluid. Diffuse fatty liver. Tiny gallstones with no sign of cholecystitis or bile duct dilation. Normal pancreas. Normal spleen. Normal and symmetric adrenal glands and kidneys. No hydronephrosis or pyelonephritis. Aortic calcification with no aneurysm. No bowel dilation. No appendicitis. No diverticulitis or colitis. Large amount of fecal material within the rectum. 1. Tiny gallstones with no sign of cholecystitis and no biliary dilation. 2. Large amount of fecal material within the rectum. 3. No mass, fluid collection, or inflammatory process within the abdomen or pelvis. Signed by Dr Cecilio Topete    XR CHEST PORTABLE    Result Date: 5/17/2022  Examination. XR CHEST PORTABLE 5/17/2022 2:17 AM History: Shortness of breath. Frontal portable upright view of the chest is obtained and compared with the previous study dated 11/18/2016. The lungs are well-expanded. There is moderate pulmonary vascular congestion. There is no pleural effusion. No areas of focal consolidation. No pneumothorax. The heart size is not evaluated due to the portable projection.  There is evidence of previous cardiac surgery and valvular replacement. A dual-chamber cardiac pacer is in place. There is no acute bony abnormality. 1. Moderate pulmonary vascular congestion. Signed by Dr Charles Allen    Result Date: 5/17/2022  Examination. CTA PULMONARY W CONTRAST 5/17/2022 2:47 AM History: Shortness of breath and elevated d-dimer. Hypoxia. DLP: 674 mGycm. The automated exposure control was utilized to minimize the patient's radiation exposure. The CT angiography of the chest is performed after contrast enhancement. The images are acquired in axial plane and subsequent 2-D reconstruction in coronal and sagittal planes. There is no previous similar study for comparison. The correlation made with chest radiograph obtained earlier today. There is normal opacification of pulmonary arteries and branches bilaterally. No filling defects in the opacified pulmonary arterial bed. The RV/LV ratio is 49/56 which is normal. Atheromatous changes thoracic aorta are seen. No aneurysmal dilatation or dissection. Severe atheromatous changes of coronary arteries are seen. There is evidence of a prior CABG. A few nonspecific mediastinal and hilar lymph nodes are seen. The limited visualized soft tissues of the neck is unremarkable. The lungs are poorly expanded. There are extensive respiratory motion artifacts which limits the diagnostic yield of the study. There are interlobular septal thickening there are scattered groundglass opacity which may represent pulmonary congestion/pulmonary edema. There is a small bibasal pleural effusion. The visualized tracheobronchial structures are patent. The liver and spleen are suboptimally evaluated due to extensive streak artifacts. The gallbladder is incompletely visualized and not evaluated evaluated. The adrenal glands bilaterally are normal. The pancreas and kidneys are incompletely included in the study and not evaluated. The abdomen is separately reviewed and reported.  The images reviewed in bone window show chronic degenerative changes of the thoracic spine. No acute bony abnormalities noted. 1. No evidence of pulmonary embolism. No aortic aneurysm or dissection. Severe atheromatous changes of coronary arteries. Evidence of a prior CABG. 2. Pulmonary congestion or pulmonary edema. The lungs are suboptimally evaluated due to respiratory motion artifacts. 3. Small bibasal pleural effusion. Above study was initially reviewed and reported by [facility]. I do not find any discrepancies. Signed by Dr Paddy Steele Course:   Mr David Damico, a 77 y. o. male with cardiovascular disease, history of A. fib s/p cardioversion, history of heart block s/p pacemaker, presented to the ED with worsening shortness of breath with associated cough over the past 2 days, noting some chest tightness difficulty taking a deep breath.       He was significantly tachypneic on arrival and hypoxic with SPO2 82% and screened positive for COVID-19.       Due to his increased work of breathing he was placed on BiPAP.       He appeared to initially be in SVT with heart rate up to 160, however after given dose of Cardizem his heart rate came down some and was in sinus tachycardia.       His tachypnea and work of breathing dramatically improved on BiPAP and was weaned to nasal cannula.       Chest x-ray Moderate pulmonary vascular congestion     CTA Chest: No evidence of pulmonary embolism. No aortic aneurysm or  dissection. Severe atheromatous changes of coronary arteries. Evidence of a prior CABG. Pulmonary congestion or pulmonary edema. The lungs are suboptimally evaluated due to respiratory motion artifacts. . Small bibasal pleural effusion.        CT Abd/Pelvis: Tiny gallstones with no sign of cholecystitis and no biliary  dilation. Large amount of fecal material within the rectum.  No mass, fluid collection, or inflammatory process within the abdomen or pelvis.        Acute hypoxic respiratory failure due to COVID-19 infection  · Improved on BiPAP,   · Continued supplemental oxygen as needed   · Procalcitonin within lab reference range, and no obvious signs of bacterial infection  · Elevated CRP: 3.12 --> 4.44 (05/18/2022)  · Oxygen supplementation to keep SPO2 above 90%  · Continuous Pulse Ox  · Dexamethasone  · Albuterol and Ipratropium inhalers  · Adjunct therapy with; Famotidine, Melatonin, Vitamin C, D, Zinc, & Inhaled Corticosteroids. · Baricitinib (Olumiant) PO Daily-dosing as per pharmacy. · Monitored LFTs and renal function   · Avoided Nebulizer treatments to avoid aerosolization.    · Encourage Prone positioning  · Enoxaparin subcu         Sepsis  Metabolic Acidosis   Lactic Acidosis  Staph Hominis Bacteremia -contaminant   · Initial lactic acid of: 10.6 (05/17/2022). · Procalcitonin: 0.12 -->  7.85 --> 5.55 --> 2.38 (05/20/2022)  · Lactic acid trended down: 10.6--> 2.8--> 2.3 --> 4.2 --> 1.7 (05/19/2022)  · UA Grossly unremarkable  · Broad-spectrum antibiotic (Vancomycin and Cefepime)  · Blood Culture (collected: 05/18/2022): Growing staphylococcal species. · Sensitivity pending  · Repeat blood cultures (05/19/2022)-pending  · IV fluids  · Seen by ID  · Positive cultures likely contaminant  · Recommended to observe off antibiotic. · Okay for discharge from ID standpoint.     CAD  · Resumed home regimen including; Plavix, Coreg, statin     Diabetes Mellitus II - newly diagnosed  · Hemoglobin A1c: 7.3% (05/17/2022)  · Inpatient Regimens to included;  ? - Insulin Glargine (Lantus) 25 units subcu nightly  ? - Insulin Lispro (Humalog) 6 units subcu pre-meal 3 times a day  ?  - Insulin Lispro (Humalog) on a Medium dose sliding scale  · Monitored POC glucose, and adjusted insulin regimen accordingly based on daily insulin requirement.        Hypertension  · Continued home meds including lisinopril and Coreg,   · Monitor BP      Paroxysmal atrial fibrillation with RVR  · Currently in sinus rhythm  · Seen by Cardiology  · Follow-up with cardiology outpatient.     Vitamin D deficiency  · 25-hydroxy vitamin D 19.8 (05/18/2022)  · Vitamin D (ergocalciferol) 50,000 units p.o. weekly (start date: 05/18/2022)     Intractable Hiccups  · Chlorpromazine 25 mg p.o. 3 times daily  (start date: 05/18/2022)  · Discontinued metoclopramide, after discussion with patient about the interaction between metoclopramide and chlorpromazine.              Continued management of other chronic medical conditions           Physical Exam:  Vital Signs: /62   Pulse 72   Temp 97.6 °F (36.4 °C) (Axillary)   Resp 18   Ht 5' 10\" (1.778 m)   Wt 200 lb 12.8 oz (91.1 kg)   SpO2 99%   BMI 28.81 kg/m²   Physical Exam  Vitals and nursing note reviewed. Constitutional:       General: He is not in acute distress. Appearance: Normal appearance. He is obese. He is not ill-appearing, toxic-appearing or diaphoretic. HENT:      Head: Normocephalic and atraumatic. Right Ear: External ear normal.      Left Ear: External ear normal.      Nose: Nose normal. No congestion or rhinorrhea. Eyes:      General: No scleral icterus. Right eye: No discharge. Left eye: No discharge. Extraocular Movements: Extraocular movements intact. Conjunctiva/sclera: Conjunctivae normal.      Pupils: Pupils are equal, round, and reactive to light. Cardiovascular:      Rate and Rhythm: Normal rate and regular rhythm. Pulses: Normal pulses. Heart sounds: Normal heart sounds. No murmur heard. No friction rub. No gallop. Pulmonary:      Effort: Pulmonary effort is normal. No respiratory distress. Abdominal:      General: Bowel sounds are normal. There is no distension. Palpations: Abdomen is soft. Tenderness: There is no abdominal tenderness. There is no guarding or rebound. Musculoskeletal:         General: No swelling, tenderness, deformity or signs of injury. Normal range of motion.       Cervical back: Normal range of motion and neck supple. No rigidity. No muscular tenderness. Right lower leg: No edema. Left lower leg: No edema. Skin:     Coloration: Skin is not jaundiced or pale. Findings: No bruising, erythema, lesion or rash. Neurological:      General: No focal deficit present. Mental Status: He is alert and oriented to person, place, and time. Cranial Nerves: No cranial nerve deficit. Motor: No weakness. Coordination: Coordination normal.   Psychiatric:         Mood and Affect: Mood normal.         Behavior: Behavior normal.         Thought Content:  Thought content normal.         Judgment: Judgment normal.         Discharge Medications:        Medication List      START taking these medications    albuterol sulfate  (90 Base) MCG/ACT inhaler  Inhale 2 puffs into the lungs 4 times daily     ascorbic acid 1000 MG tablet  Commonly known as: VITAMIN C  Take 1 tablet by mouth daily  Start taking on: May 21, 2022     budesonide-formoterol 160-4.5 MCG/ACT Aero  Commonly known as: SYMBICORT  Inhale 2 puffs into the lungs 2 times daily     chlorproMAZINE 25 MG tablet  Commonly known as: THORAZINE  Take 1 tablet by mouth 3 times daily for 5 days     dexamethasone 6 MG tablet  Commonly known as: DECADRON  Take 1 tablet by mouth daily (with breakfast) for 2 days  Start taking on: May 21, 2022     Vitamin D (Ergocalciferol) 60702 units Caps  Take 50,000 Units by mouth once a week  Start taking on: May 25, 2022     zinc sulfate 220 (50 Zn) MG capsule  Commonly known as: ZINCATE  Take 1 capsule by mouth daily  Start taking on: May 21, 2022        Chante Williamson taking these medications    atorvastatin 20 MG tablet  Commonly known as: LIPITOR  Take 1 tablet by mouth once daily     carvedilol 12.5 MG tablet  Commonly known as: COREG  1 bid     clopidogrel 75 MG tablet  Commonly known as: PLAVIX  One daily     lisinopril 40 MG tablet  Commonly known as: PRINIVIL;ZESTRIL  Take 1 tablet by mouth daily     pantoprazole 40 MG tablet  Commonly known as: Protonix  Take 1 tablet by mouth daily     sildenafil 100 MG tablet  Commonly known as: Viagra  Take 1 tablet by mouth as needed for Erectile Dysfunction        STOP taking these medications    metoclopramide 10 MG tablet  Commonly known as: REGLAN           Where to Get Your Medications      These medications were sent to [facility]/pharmacy #[id number], [address] [facility] [phone]  [address], [address] [place] [address]    Phone: [phone]   · albuterol sulfate  (90 Base) MCG/ACT inhaler  · ascorbic acid 1000 MG tablet  · budesonide-formoterol 160-4.5 MCG/ACT Aero  · chlorproMAZINE 25 MG tablet  · dexamethasone 6 MG tablet  · Vitamin D (Ergocalciferol) 40830 units Caps  · zinc sulfate 220 (50 Zn) MG capsule           Discharge Instructions: Follow up with Bina Gonzalez MD in 7 days. Take medications as directed. Resume activity as tolerated. [facility]  [facility] [address] [phone]    On 6/15/2022  3:15pm post hosital follow up appointment. IF you still have covid symptoms, please reschedule appointment    Bina Gonzalez MD  [address] [address]  [phone]    On 5/27/2022  AT  Martin Mccarty MD  Infectious Disease [facility]  [phone]      Please schedule telehealth follow-up with me for next week. CBL      Followup Appointments Scheduled at Time of Discharge:  Future Appointments   Date Time Provider [facility]   5/27/2022  8:45 AM Bina Gonzalez MD [facility]   6/15/2022  3:15 PM FL[facility]O Quintero [facility]   11/25/2022 11:30 AM MD VAL [facility]          Diet: ADULT DIET;  Regular; Low Fat/Low Chol/High Fiber/KAILEE; Safety Tray; Safety Tray (Disposables)        DISCHARGE STATUS:    Condition on Discharge: stable  Disposition: Patient is medically stable and will be discharged Home      Extended Emergency Contact Information  Primary Emergency Contact: MEGHNA JADEN [facility], [address] Phone: [phone]  Relation: Child         Time Spent on discharge is  35 mins in the examination, evaluation, counseling and review of medications and discharge plan. Electronically signed by   Laura Cisneros MD, MPH, MD,   Internal Medicine Hospitalist   5/20/2022 10:46 AM      Thank you Carson Vega MD for the opportunity to be involved in this patient's care. If you have any questions or concerns please feel free to contact me at [phone]        EMR Dragon/Transcription disclaimer:   Much of this encounter note is an electronic transcription/translation of spoken language to printed text.  The electronic translation of spoken language may permit erroneous, or at times, nonsensical words or phrases to be inadvertently transcribed; although attempts have made to review the note for such errors, some may still exist.

## 2022-05-20 NOTE — PLAN OF CARE
Problem: Chronic Conditions and Co-morbidities  Goal: Patient's chronic conditions and co-morbidity symptoms are monitored and maintained or improved  5/20/2022 1013 by Gweneth Dance, RN  Outcome: Adequate for Discharge  5/20/2022 0541 by Tana Morales RN  Outcome: Progressing     Problem: Discharge Planning  Goal: Discharge to home or other facility with appropriate resources  5/20/2022 1013 by Gweneth Dance, RN  Outcome: Adequate for Discharge  5/20/2022 0541 by Tana Morales RN  Outcome: Progressing

## 2022-05-20 NOTE — PROGRESS NOTES
Infectious Diseases Progress Note    Patient:  Pepe Brown  YOB: 1945  MRN: [id number]   Admit date: 5/17/2022   Admitting Physician: Joaquina Perez MD  Primary Care Physician: Clara Estrella MD    Chief Complaint/Interval History: He is without fever. He is hemodynamically stable. He feels fine. Oxygen saturation 97 to 99% on room air. He has minimal cough. No productive cough. No chest pain. No chest pressure. No gastrointestinal symptoms. No urinary symptoms. He slept well last night. No chills or sweats overnight. He wants to go home. He feels ready to go home. He feels he is at baseline. I contacted the microbiology laboratory. He had 1 set of blood cultures initially that grew Staph hominis. Other set remains no growth. Blood cultures yesterday all no growth.     In/Out    Intake/Output Summary (Last 24 hours) at 5/20/2022 0919  Last data filed at 5/19/2022 1800  Gross per 24 hour   Intake 1100 ml   Output --   Net 1100 ml     Allergies: No Known Allergies  Current Meds: chlorproMAZINE (THORAZINE) tablet 25 mg, TID  zinc sulfate (ZINCATE) capsule 50 mg, Daily  ascorbic acid (VITAMIN C) tablet 1,000 mg, Daily  vitamin D (ERGOCALCIFEROL) capsule 50,000 Units, Weekly  glucose chewable tablet 16 g, PRN  dextrose bolus 10% 125 mL, PRN   Or  dextrose bolus 10% 250 mL, PRN  glucagon (rDNA) injection 1 mg, PRN  dextrose 5 % solution, PRN  insulin glargine (LANTUS) injection vial 25 Units, Nightly  insulin lispro (HUMALOG) injection vial 6 Units, TID WC  0.9 % sodium chloride infusion, Continuous  dexamethasone (PF) (DECADRON) injection 6 mg, Daily  atorvastatin (LIPITOR) tablet 20 mg, Daily  carvedilol (COREG) tablet 12.5 mg, BID WC  clopidogrel (PLAVIX) tablet 75 mg, Daily  [Held by provider] lisinopril (PRINIVIL;ZESTRIL) tablet 40 mg, Daily  pantoprazole (PROTONIX) tablet 40 mg, Daily  sodium chloride flush 0.9 % injection 5-40 mL, 2 times per day  sodium chloride flush ORTHO POD #3  No complaints  michael po  AVSS  Hg = 25.7  Dressing CDI  mathew's neg  NVI  Stable  Post op anemia, on Fe  Probable DC to SNF today  Ortho DC instructions listed  F/U with me in 10 days  Tony Lopez MD  2/4/2020 7:48 AM         0.9 % injection 5-40 mL, PRN  0.9 % sodium chloride infusion, PRN  ondansetron (ZOFRAN-ODT) disintegrating tablet 4 mg, Q8H PRN   Or  ondansetron (ZOFRAN) injection 4 mg, Q6H PRN  polyethylene glycol (GLYCOLAX) packet 17 g, Daily PRN  acetaminophen (TYLENOL) tablet 650 mg, Q6H PRN   Or  acetaminophen (TYLENOL) suppository 650 mg, Q6H PRN  potassium chloride (KLOR-CON M) extended release tablet 40 mEq, PRN   Or  potassium bicarb-citric acid (EFFER-K) effervescent tablet 40 mEq, PRN   Or  potassium chloride 10 mEq/100 mL IVPB (Peripheral Line), PRN  magnesium sulfate 2000 mg in 50 mL IVPB premix, PRN  albuterol sulfate  (90 Base) MCG/ACT inhaler 2 puff, 4x daily  budesonide-formoterol (SYMBICORT) 160-4.5 MCG/ACT inhaler 2 puff, BID  insulin lispro (HUMALOG) injection vial 0-12 Units, TID WC  insulin lispro (HUMALOG) injection vial 0-6 Units, Nightly  enoxaparin (LOVENOX) injection 90 mg, BID      Review of Systems see HPI    VitalSigns:  /62   Pulse 72   Temp 97.6 °F (36.4 °C) (Axillary)   Resp 18   Ht 5' 10\" (1.778 m)   Wt 200 lb 12.8 oz (91.1 kg)   SpO2 99%   BMI 28.81 kg/m²      Physical Exam  Line/IV site: No erythema, warmth, induration, or tenderness.   Abdomen soft and nontender  Lungs clear without crackles  Abdomen soft nontender  Extremities without significant edema    Lab Results:  CBC:   Recent Labs     05/18/22  0404 05/19/22  0503 05/20/22  0119   WBC 11.1* 10.2 7.2   HGB 12.0* 11.3* 10.9*    205 175     BMP:  Recent Labs     05/18/22  0404 05/19/22  0503 05/20/22  0119    136 136   K 4.0 4.5 4.6    105 105   CO2 19* 21* 19*   BUN 29* 26* 21   CREATININE 1.1 0.8 0.9   GLUCOSE 204* 217* 202*     Component Ref Range & Units 5/20/22 0119 5/19/22 0503 5/18/22 0404 5/17/22 0206   CRP 0.00 - 0.50 mg/dL 0.83 High   1.57 High   4.44 High   3.12 High       Component Ref Range & Units 5/20/22 0119 5/19/22 0503 5/18/22 1407 5/17/22 0206   Procalcitonin 0.00 - 0.09 ng/mL 2.38 High   5.55 High  CM  7.85 High  CM  0.12 High  CM      CultureResults:  Blood cultures May 18, 2022-1 of 2 sets Staph hominis  Blood cultures yesterday no growth    Radiology: None    Additional Studies Reviewed:  None    Impression:  1. COVID-19 pneumonia-improving. His CRP has normalized. He remains without fever. He is on room air. Feel his elevated markers of inflammation and procalcitonin may have been related to COVID. The elevated procalcitonin somewhat atypical, but he had no preceding symptoms to suggest bacterial infection. Feel his positive blood cultures are contaminant. 2.  Positive blood cultures for Staph hominis-contaminant  3. History coronary artery disease  4. History of valvular heart disease with mitral valvuloplasty    Recommendations:  He seems to be doing well  Okay with me for discharge home  Suggest dexamethasone 6 mg orally daily for an additional 2 days  Do not feel he needs any antibiotic treatment at discharge  Talked with the patient about returning should he develop fever, chills, dyspnea, cough, or other signs of infection. His daughter was on speaker phone as well for our discussion. He is going to establish primary care with [facility] internal medicine. I would be happy to see him next week for follow-up of his COVID via telehealth. He has a smart phone and was agreeable to telehealth appointment next week. Will sign off for now.   Please call if any additional questions for infectious diseases  Discussed with nursing staff    Lambert Lloyd MD

## 2022-05-20 NOTE — PROGRESS NOTES
New patient forms received from Dr. Suzanne Allan office. Patient was able to fill out forms and nurse faxed back to their office. Successful fax transmission page received. Copy of forms also given to patient upon discharge. Patient is aware of appointment and shows understanding at this time.      Electronically signed by Gweneth Dance, RN on 5/20/2022 at 12:33 PM

## 2022-05-23 ENCOUNTER — CARE COORDINATION (OUTPATIENT)
Dept: CASE MANAGEMENT | Age: 77
End: 2022-05-23

## 2022-05-23 ENCOUNTER — TELEPHONE (OUTPATIENT)
Dept: INTERNAL MEDICINE | Age: 77
End: 2022-05-23

## 2022-05-23 LAB — BLOOD CULTURE, ROUTINE: NORMAL

## 2022-05-23 NOTE — TELEPHONE ENCOUNTER
Char[facility] 45 Transitions Initial Follow Up Call    Outreach made within 2 business days of discharge: Yes    Patient: Tammie Ríos   Patient : 1945 MRN: [id number]    Reason for Admission: Shortness of breath with cough  Discharge Date: 22      Discharge Diagnoses:  Principal Problem:    Acute hypoxemic respiratory failure due to COVID-19 [facility])  Active Problems:    Paroxysmal atrial fibrillation with rapid ventricular response (HCC)    Type 2 diabetes mellitus with hyperglycemia ([facility]    Bacteremia due to Gram-positive bacteria    Pacemaker    Essential hypertension    CAD (coronary artery disease)    Mixed hyperlipidemia    Nonischemic cardiomyopathy ([facility]  Resolved Problems:    * No resolved hospital problems. *     Spoke with: Marla David    Discharge department/facility: [facility]    TCM Interactive Patient Contact:  Was patient able to fill all prescriptions: Yes  Was patient instructed to bring all medications to the follow-up visit: Yes  Is patient taking all medications as directed in the discharge summary? Yes  Does patient understand their discharge instructions: Yes  Does patient have questions or concerns that need addressed prior to 7-14 day follow up office visit: no    Jamee Lester she states that patient is doing well. Patient does still have some shortness of breath. He is already scheduled for follow up on 22. Pt will bring medications to his apt. Per Marla David she did not know of anything that the patient needed at this time.     Scheduled appointment with PCP within 7-14 days    Follow Up  Future Appointments   Date Time Provider Kailyn Flores   2022  8:45 AM [facility] Severs, [facility]   6/15/2022  3:15 PM FLACO Alvarez [facility] [place]   2022 11:30 AM MD VAL Durán [facility] [place]       Edgardo Noonan, [place]

## 2022-05-23 NOTE — CARE COORDINATION
Char[facility] 45 Transitions Initial Follow Up Call    Call within 2 business days of discharge: Yes    Patient: Izzy Dempsey Patient : 1945   MRN: [id number]  Reason for Admission: COVID 19  Discharge Date: 22 RARS: Readmission Risk Score: 14.2 ( )      Last Discharge [facility]       Complaint Diagnosis Description Type Department Provider    22 Chest Pain; Shortness of Breath Acute respiratory failure with hypoxemia ([address] . .. ED to Hosp-Admission (Discharged) (ADMITTED) [facility] CCU Jeff Villalpando MD; Alli Carrington. .. Spoke with: N/A    Facility: [address] [place] [address]    Non-face-to-face services provided:  Reviewed encounter information for continuity of care prior to follow up Care Transitions Coordination phone call - chart notes, consults, progress notes, test results, med list, appointments, AVS, other information. Care Transitions 24 Hour Call    Care Transitions Interventions         Follow Up  Future Appointments   Date Time Provider Kailyn Flores   2022  8:45 AM Klever Mckeon MD [facility] [place]   6/15/2022  3:15 PM FLACO Ballard [facility] [place]   2022 11:30 AM Kaity Mann MD N [facility]     Attempted to make contact with patient/caregiver for an initial Care Transitions Coordination follow up call post discharge from the hospital without success. Unable to leave a message regarding intent of call and call back information. Call was immediately forwarded to a message that voice mail has not been set up yet. CTN will follow up again at a later time. Any previously scheduled hospital follow up appointments noted below.       Tonya Dinh RN

## 2022-05-23 NOTE — PROGRESS NOTES
Subjective  Thomas Redding is a 76-year-old male who comes today with an infected sebaceous cyst to the left flank. He has been present for several days and has subsequently worsened. On examination the left flank, there is a erythematous fluctuant mass that is tender to palpation. It measures approximately 3 cm in size. There is no appreciable drainage.         Objective  Patient Active Problem List    Diagnosis Date Noted    Bacteremia due to Gram-positive bacteria 05/19/2022    Acute hypoxemic respiratory failure due to COVID-19 [facility]) 05/17/2022    Type 2 diabetes mellitus with hyperglycemia ([facility] 05/18/2022    Nonischemic cardiomyopathy ([facility] 11/02/2021    Mixed hyperlipidemia 08/16/2017    CAD (coronary artery disease)     Mitral regurgitation 04/22/2014    Ischemic dilated cardiomyopathy ([facility] 04/22/2014    Essential hypertension     CHB (complete heart block) ([facility])     Pacemaker     Paroxysmal atrial fibrillation with rapid ventricular response ([facility]) 03/19/2014       Current Outpatient Medications   Medication Sig Dispense Refill    pantoprazole (PROTONIX) 40 MG tablet Take 1 tablet by mouth daily 90 tablet 3    atorvastatin (LIPITOR) 20 MG tablet Take 1 tablet by mouth once daily 90 tablet 3    carvedilol (COREG) 12.5 MG tablet 1 bid 180 tablet 3    clopidogrel (PLAVIX) 75 MG tablet One daily 90 tablet 3    lisinopril (PRINIVIL;ZESTRIL) 40 MG tablet Take 1 tablet by mouth daily 90 tablet 3    sildenafil (VIAGRA) 100 MG tablet Take 1 tablet by mouth as needed for Erectile Dysfunction 16 tablet 2    albuterol sulfate  (90 Base) MCG/ACT inhaler Inhale 2 puffs into the lungs 4 times daily 18 g 3    budesonide-formoterol (SYMBICORT) 160-4.5 MCG/ACT AERO Inhale 2 puffs into the lungs 2 times daily 10.2 g 3    dexamethasone (DECADRON) 6 MG tablet Take 1 tablet by mouth daily (with breakfast) for 2 days 2 tablet 0    zinc sulfate (ZINCATE) 220 (50 Zn) MG capsule Take 1 capsule by mouth daily 30 capsule 0    ascorbic acid (VITAMIN C) 1000 MG tablet Take 1 tablet by mouth daily 30 tablet 0    [START ON 5/25/2022] Ergocalciferol (VITAMIN D) 84023 units CAPS Take 50,000 Units by mouth once a week 5 capsule 0    chlorproMAZINE (THORAZINE) 25 MG tablet Take 1 tablet by mouth 3 times daily for 5 days 15 tablet 0     No current facility-administered medications for this visit. Allergies: Patient has no known allergies.     Past Medical History:   Diagnosis Date    Atrial fibrillation ([address] 3/19/14    s/p cardioversion    CAD (coronary artery disease)     CHB (complete heart block) (HCC)     GERD (gastroesophageal reflux disease)     Hyperlipidemia     Hypertension     Ischemic dilated cardiomyopathy ([address]     Mitral regurgitation     Pacemaker 3/31/14       Past Surgical History:   Procedure Laterality Date    CARDIAC CATHETERIZATION  3/19/14  MDL    EF15-20%    CARDIAC VALVE REPLACEMENT  03/24/2014    Combined Mitral Valve Repair w/ 30 mm Bryant Annuloplasty Ring, CABG X 1 - SVG-OM, Torrez-MAZE Radiofrequency Ablation (JPO)    CARDIOVERSION  3/19/14  MDL    CORONARY ARTERY BYPASS GRAFT  03/24/2014    Combined Mitral Valve Repair w/ #30mm Bryant Annuloplasty Ring, CABG X 1 - SVG-OM, Torrez-MAZE Radiofrequency Ablation (JPO)    PACEMAKER INSERTION  3/31/14  [facility]       Family History   Problem Relation Age of Onset    Heart Disease Other     Lung Cancer Father     Coronary Art Dis Brother     Coronary Art Dis Brother     High Blood Pressure Neg Hx     Cancer Neg Hx     Diabetes Neg Hx        Social History     Tobacco Use    Smoking status: Former Smoker     Types: Cigarettes    Smokeless tobacco: Never Used   Substance Use Topics    Alcohol use: Yes     Comment: occ        Review of systems  Reviewed and positive for the above all other systems noted to be negative    Exam  Temperature 97.1 °F (36.2 °C), temperature source Temporal, height 5' 10\" (1.778 m), weight 226 lb (102.5 kg). Please see HPI        Assessment  Infected sebaceous cyst    Plan/procedure  This will require incision and drainage. Following informed consent and Xylocaine anesthesia the area was incised and copious amounts of sebaceous material was expressed. There is partial removal of the cyst capsule as well. He tolerated the procedure well. Dressing change instructions were given. We will plan to see the patient back in the office as needed.

## 2022-05-24 ENCOUNTER — CARE COORDINATION (OUTPATIENT)
Dept: CASE MANAGEMENT | Age: 77
End: 2022-05-24

## 2022-05-24 LAB
BLOOD CULTURE, ROUTINE: NORMAL
CULTURE, BLOOD 2: NORMAL

## 2022-05-24 NOTE — CARE COORDINATION
[facility] 45 Transitions Initial Follow Up Call    Call within 2 business days of discharge: Yes    Patient: Car Pall Patient : 1945   MRN: [id number]  Reason for Admission:Acute Hypoxic Respiratory Failure secondary to COVID 19  Discharge Date: 22 RARS: Readmission Risk Score: 14.2 ( )      Last Discharge [facility]       Complaint Diagnosis Description Type Department Provider    22 Chest Pain; Shortness of Breath Acute respiratory failure with hypoxemia ([facility] . .. ED to Hosp-Admission (Discharged) (ADMITTED) [facility] CCU Elizabeth Hooper MD; Arjun Broussard. .. Spoke with: [address] [place] [address]: [facility]    Non-face-to-face services provided:  Reviewed encounter information for continuity of care prior to follow up Care Transitions Coordination phone call - chart notes, consults, progress notes, test results, med list, appointments, AVS, other information. Care Transitions 24 Hour Call    Schedule Follow Up Appointment with PCP: Completed  Do you have a copy of your discharge instructions?: Yes  Have you been contacted by a [address]?: No  Have you scheduled your follow up appointment?: Yes  How are you going to get to your appointment?: Car - drive self  Care Transitions Interventions         Follow Up  Future Appointments   Date Time Provider Kailyn Flores   2022  8:45 AM MD HI BelloY [facility]   6/15/2022  3:15 PM FLACO Peters [facility]   2022 11:30 AM MD VAL Mckeon [facility]     Placed a call to the number listed for patient for an initial follow up call for Care Transitions Coordination following discharge from the hospital.  Introduced myself and explained the purpose of my call as well as verifying name, date of birth of patient. Spoke with patient regarding hospitalization, discharge and status thus far. He reported that he is not doing too great.   He said he is still having a hard time.  He said that he has no energy, no strength or endurance. He said he has no appetite. He said he is drinking a lot, but when I asked for an amount, it turns out he is only drinking about 20 ounces of fluid a day. I did encourage him to increase to about 50-60 ounces of fluids a day unless he has some contraindication such as renal or heart issues, which I did not see on his chart. He is not aware of any. I told him he could drink water, tea, lemonade, juice, other liquids, whatever sounds good and quinches his thirst.  I also encouraged small, frequent meals with a variety of fresh fruits, vegetables, proteins, carbs for energy, etc.  He said he has a pulse ox but the battery has  in it and he does not have one to replace it. I did encourage him to have someone get one so he can check his readings often so he can know how it is running. Did discuss ensuring that it stays up in the mid to upper 90s. He said he was near 97% while in the hospital.  Informed him we want him at least above 92% at all times. Informed of signs of hypoxia, cyanosis, etc.  He was not near his medications, so he was not able to do a medication review. He did say that he has one inhaler, pretty sure it was Albuteral he said. He did not recall picking up Symbicort. I will check with his pharmacy and if they do not have it, check with his PCP about getting this sent it. He feels like he has used up the inhaler he has on hand. Unsure if he brought it from the hospital or if he got it at the pharmacy. Will check on this. He is also not sure that he is taking Decadron. He will check once he is near his meds again. He is aware of his hospital follow up appointments, with ID and PCP. Will check with pharmacy and let patient know what I am able to find out. Informed/reminded of Care Transitions Coordination process, purpose, future calls, etc and is agreeable with appropriate follow up.   Ensured to have CTN contact information to be used as needed. Encouraged to call for new/ongoing issues, questions, concerns, etc.  Encouraged to make contact with PCP as indicated for any further needs as well. Given the opportunity to ask questions and answered appropriately. CTN to follow up as indicated. Transitions of Care Initial Call    Was this an external facility discharge? No    Challenges to be reviewed by the provider   Additional needs identified to be addressed with provider: No       Method of communication with provider : Check with pharmacy about Symbicort, follow up as indicated    Advance Care Planning:   Does patient have an Advance Directive: Not on file, education provided. Advance Care Planning   Healthcare Decision Maker:    Primary Decision Maker: Thuy Fortune Child - [phone]    Care Transition Nurse contacted the patient by telephone to perform post hospital discharge assessment. Verified name and  with patient as identifiers. Provided introduction to self, and explanation of the CTN role. CTN reviewed discharge instructions, medical action plan and red flags with patient who verbalized understanding. Patient given an opportunity to ask questions and does not have any further questions or concerns at this time. Were discharge instructions available to patient? Yes. Reviewed appropriate site of care based on symptoms and resources available to patient including: PCP  Specialist  Urgent care clinics  [facility]   When to call [address] The patient agrees to contact the PCP office for questions related to their healthcare. Medication reconciliation was not performed with patient, declined, unable, not near meds at the time. He verbalizes understanding of administration of home medications. Advised obtaining a 90-day supply of all daily and as-needed medications. Was patient discharged with a pulse oximeter? No, had one at home prior to hospitalization.   Batteries down in it.  Need to be replaced. Parameters discussed. CTN provided contact information. Plan for follow-up call in 3-5 days based on severity of symptoms and risk factors.   Plan for next call: - Plan for next call - assess overall status, appetite, sleep patterns, abnormal signs and symptoms, availability and effectiveness of medications, activity level and tolerance, findings from follow up appointments, medication/treatment changes, any additional teaching needs, infection control, seeking medical attention, who/when to call prn any needs, etc.    Nancy Perkins RN

## 2022-05-24 NOTE — CARE COORDINATION
Placed a call to [address] in [facility] and spoke with the pharmacist.  She said that patient did  Albuterol, CItamin C, Thorazine, Decadron and Zinc.  She has Symbicort and Vitamin D waiting to be picked up. I will let patient know about this.

## 2022-05-24 NOTE — CARE COORDINATION
Spoke with patient and told him that the pharmacy has prescriptions waiting for him to , the Symbicort and the Vitamin D. Informed him that he did get all of the others that he was supposed to get at discharge.

## 2022-05-26 ENCOUNTER — CARE COORDINATION (OUTPATIENT)
Dept: CASE MANAGEMENT | Age: 77
End: 2022-05-26

## 2022-05-26 NOTE — CARE COORDINATION
[facility] 45 Transitions Follow Up Call    2022    Patient: Mickie Peterson  Patient : 1945   MRN: [id number]    Discharge Date: 22 RARS: Readmission Risk Score: 14.2 ( )         Spoke with: Poncho Casarez Transitions Subsequent and Final Call    Subsequent and Final Calls  Care Transitions Interventions  Other Interventions: Follow Up  Future Appointments   Date Time Provider Kailyn Flores   2022  8:45 AM Malou Caro MD [facility] [place]   6/15/2022  3:15 PM FLACO Connor [facility] [place]   2022 11:30 AM MD VAL Rivera [facility] [place]     Placed a call to patient and spoke with him for a follow up call. He reported that he is doing a little better. Said that his breathing is some better today. He said that he did get his inhalers and and he thinks they are helping. He is eating better. He said he is taking it easy, trying to do some things, but not going overboard. He wants to work in the yard, but knows he can't do too much. He is doing ok at this time. Will call prn any problems. CTN to follow up in a few days as indicated.      Isma Shrestha RN

## 2022-05-26 NOTE — PROGRESS NOTES
Post-Discharge Transitional Care  Follow Up      Tiffany Chambers   YOB: 1945    Date of Office Visit:  5/27/2022  Date of Hospital Admission: 5/17/22  Date of Hospital Discharge: 5/20/22  Risk of hospital readmission (high >=14%. Medium >=10%) :Readmission Risk Score: 14.2 ( )      Care management risk score Rising risk (score 2-5) and Complex Care (Scores >=6): 2     Non face to face  following discharge, date last encounter closed (first attempt may have been earlier): 5/24/2022 11:14 AM    Call initiated 2 business days of discharge: Yes    ASSESSMENT/PLAN:   Type 2 diabetes mellitus with other circulatory complication, without long-term current use of insulin ([facility])  Assessment & Plan:   Well-controlled, lifestyle modifications recommended   Patient currently diet controlled will provide education baseline diabetic eye exam microalbumin patient has not been aware that his diabetic  Orders:  -     Amb External Referral To Ophthalmology  -     [facility] External Referral To Diabetic Education  -     Microalbumin / Creatinine Urine Ratio; Standing  -     MyChart Glucose Flowsheet  -      DIABETES FOOT EXAM  -     Comprehensive Metabolic Panel; Standing  -     Hemoglobin A1C; Standing  -     Lipid Panel; Standing  -     Urinalysis; Standing  Screening for thyroid disorder  -     TSH; Standing  Prostate cancer screening  -     PSA Screening; Standing  Screening for deficiency anemia  -     CBC with Auto Differential; Standing  HFrEF (heart failure with reduced ejection fraction) ([facility])  -     Brain Natriuretic Peptide; Future  Anemia, unspecified type  -     Iron and TIBC; Future  -     Electrophoresis Protein, Serum with Reflex to Immunofixation; Future  -     Ferritin; Future  -     Vitamin B12 & Folate;  Future  Calculus of gallbladder and bile duct with obstruction without cholecystitis  Assessment & Plan:   Asymptomatic, lifestyle modifications recommended  Acute hypoxemic respiratory failure due to COVID-19 [facility])  Assessment & Plan:   Borderline controlled, recovering from illness  Nonischemic cardiomyopathy [facility])  Assessment & Plan:   Monitored by specialist- no acute findings meriting change in the plan  Essential hypertension  Assessment & Plan:   Well-controlled, continue current medications, medication adherence emphasized and lifestyle modifications recommended      Medical Decision Making: moderate complexity  No follow-ups on file. On this date 5/27/2022 I have spent15 minutes reviewing previous notes, test results and face to face with the patient discussing the diagnosis and importance of compliance with the treatment plan as well as documenting on the day of the visit. Subjective:   HPI:  Follow up of Hospital problems/diagnosis(es):   70-year-old male who presents as a new patient visit admitted due to acute respiratory failure possibly due to COVID infection  Is questionable information patient had paroxysmal atrial fibrillation status post cardioversion patient says he does not remember  He is known to have ischemic cardiomyopathy managed by cardiology  Patient was diagnosed with diabetes during this admission  Patient has not been seen by primary care  States that he knows he has a lot of skin lesions does not want to go to dermatologist  Had colonoscopy unclear where  Patient is also informed that he has anemia unclear if this is new related to COVID advised to repeat blood work    Inpatient course: Discharge summary reviewed- see chart.     Interval history/Current status: See above    Patient Active Problem List   Diagnosis    Paroxysmal atrial fibrillation with rapid ventricular response (HCC)    CHB (complete heart block) ([address]    Pacemaker    Essential hypertension    Mitral regurgitation    HFrEF (heart failure with reduced ejection fraction) ([address]    CAD (coronary artery disease)    Mixed hyperlipidemia    Nonischemic cardiomyopathy ([address]    Acute hypoxemic respiratory failure due to COVID-19 [facility])    Type 2 diabetes mellitus with hyperglycemia (HCC)    Calculus of gallbladder and bile duct with obstruction without cholecystitis       Medications listed as ordered at the time of discharge from hospital     Medication List          Accurate as of May 27, 2022 10:13 AM. If you have any questions, ask your nurse or doctor.             CONTINUE taking these medications    albuterol sulfate  (90 Base) MCG/ACT inhaler  Inhale 2 puffs into the lungs 4 times daily     ascorbic acid 1000 MG tablet  Commonly known as: VITAMIN C  Take 1 tablet by mouth daily     atorvastatin 20 MG tablet  Commonly known as: LIPITOR  Take 1 tablet by mouth once daily     budesonide-formoterol 160-4.5 MCG/ACT Aero  Commonly known as: SYMBICORT  Inhale 2 puffs into the lungs 2 times daily     carvedilol 12.5 MG tablet  Commonly known as: COREG  1 bid     chlorproMAZINE 25 MG tablet  Commonly known as: THORAZINE  Take 1 tablet by mouth 3 times daily for 5 days     clopidogrel 75 MG tablet  Commonly known as: PLAVIX  One daily     lisinopril 40 MG tablet  Commonly known as: PRINIVIL;ZESTRIL  Take 1 tablet by mouth daily     pantoprazole 40 MG tablet  Commonly known as: Protonix  Take 1 tablet by mouth daily     sildenafil 100 MG tablet  Commonly known as: Viagra  Take 1 tablet by mouth as needed for Erectile Dysfunction     Vitamin D (Ergocalciferol) 92157 units Caps  Take 50,000 Units by mouth once a week     zinc sulfate 220 (50 Zn) MG capsule  Commonly known as: ZINCATE  Take 1 capsule by mouth daily              Medications marked \"taking\" at this time  Outpatient Medications Marked as Taking for the 5/27/22 encounter (Office Visit) with Renaldo Valencia MD   Medication Sig Dispense Refill    albuterol sulfate  (90 Base) MCG/ACT inhaler Inhale 2 puffs into the lungs 4 times daily 18 g 3    budesonide-formoterol (SYMBICORT) 160-4.5 MCG/ACT AERO Inhale 2 puffs into the lungs 2 times daily 10.2 g 3    zinc sulfate (ZINCATE) 220 (50 Zn) MG capsule Take 1 capsule by mouth daily 30 capsule 0    ascorbic acid (VITAMIN C) 1000 MG tablet Take 1 tablet by mouth daily 30 tablet 0    Ergocalciferol (VITAMIN D) 44281 units CAPS Take 50,000 Units by mouth once a week 5 capsule 0    pantoprazole (PROTONIX) 40 MG tablet Take 1 tablet by mouth daily 90 tablet 3    atorvastatin (LIPITOR) 20 MG tablet Take 1 tablet by mouth once daily 90 tablet 3    carvedilol (COREG) 12.5 MG tablet 1 bid 180 tablet 3    clopidogrel (PLAVIX) 75 MG tablet One daily 90 tablet 3    lisinopril (PRINIVIL;ZESTRIL) 40 MG tablet Take 1 tablet by mouth daily 90 tablet 3    sildenafil (VIAGRA) 100 MG tablet Take 1 tablet by mouth as needed for Erectile Dysfunction 16 tablet 2        Medications patient taking as of now reconciled against medications ordered at time of hospital discharge: Yes    fatigue, worried about hiccups, multiple skin lesions    Objective:    /62   Pulse 73   Ht 5' 10\" (1.778 m)   Wt 220 lb (99.8 kg)   SpO2 99%   BMI 31.57 kg/m²   General Appearance: alert and oriented to person, place and time, well-developed and well-nourished, in no acute distress and alert and oriented to person, place and time  Skin: warm and dry, no rash or erythema and lesion noted-  multiple both arms and face   Head: normocephalic and atraumatic  Eyes: pupils equal, round, and reactive to light, extraocular eye movements intact, conjunctivae normal  ENT: tympanic membrane, external ear and ear canal normal bilaterally, oropharynx clear and moist with normal mucous membranes  Neck: neck supple and non tender without mass, no thyromegaly or thyroid nodules, no cervical lymphadenopathy and neck supple and non tender without mass   Pulmonary/Chest: clear to auscultation bilaterally- no wheezes, rales or rhonchi, normal air movement, no respiratory distress  Cardiovascular: normal rate, normal S1 and S2, no gallops, intact distal pulses and no carotid bruits  Abdomen: soft, non-tender, non-distended, normal bowel sounds, no masses or organomegaly  Extremities: no cyanosis, no clubbing and no edema  Musculoskeletal: normal range of motion, no joint swelling, deformity or tenderness  Neurologic: reflexes normal and symmetric, no cranial nerve deficit, gait, coordination and speech normal, gait and coordination normal and speech normal    Physical Exam  Vitals reviewed. Constitutional:       General: He is not in acute distress. HENT:      Head: Normocephalic. Right Ear: Tympanic membrane and ear canal normal. There is no impacted cerumen. Left Ear: Tympanic membrane and ear canal normal. There is no impacted cerumen. Nose: Nose normal. No congestion. Mouth/Throat:      Mouth: Mucous membranes are moist.   Eyes:      General: Scleral icterus present. Right eye: No discharge. Left eye: No discharge. Extraocular Movements: Extraocular movements intact. Conjunctiva/sclera: Conjunctivae normal.      Pupils: Pupils are equal, round, and reactive to light. Neck:      Vascular: No carotid bruit. Cardiovascular:      Rate and Rhythm: Normal rate and regular rhythm. Pulses: Normal pulses. Heart sounds: Normal heart sounds. Pulmonary:      Effort: Pulmonary effort is normal. No respiratory distress. Breath sounds: Normal breath sounds. No stridor. No wheezing, rhonchi or rales. Abdominal:      General: Abdomen is flat. Bowel sounds are normal.      Palpations: Abdomen is soft. Comments: distasis recti     Musculoskeletal:         General: Normal range of motion. Cervical back: Normal range of motion and neck supple. No rigidity. Lymphadenopathy:      Cervical: No cervical adenopathy. Skin:     General: Skin is warm. Findings: Lesion present. Neurological:      Mental Status: He is alert and oriented to person, place, and time.       Cranial Nerves: No cranial

## 2022-05-27 ENCOUNTER — OFFICE VISIT (OUTPATIENT)
Dept: INTERNAL MEDICINE | Age: 77
End: 2022-05-27
Payer: OTHER GOVERNMENT

## 2022-05-27 VITALS
OXYGEN SATURATION: 99 % | DIASTOLIC BLOOD PRESSURE: 62 MMHG | WEIGHT: 220 LBS | HEIGHT: 70 IN | HEART RATE: 73 BPM | BODY MASS INDEX: 31.5 KG/M2 | SYSTOLIC BLOOD PRESSURE: 122 MMHG

## 2022-05-27 DIAGNOSIS — I42.8 NONISCHEMIC CARDIOMYOPATHY (HCC): ICD-10-CM

## 2022-05-27 DIAGNOSIS — K80.71 CALCULUS OF GALLBLADDER AND BILE DUCT WITH OBSTRUCTION WITHOUT CHOLECYSTITIS: ICD-10-CM

## 2022-05-27 DIAGNOSIS — I10 ESSENTIAL HYPERTENSION: ICD-10-CM

## 2022-05-27 DIAGNOSIS — E11.59 TYPE 2 DIABETES MELLITUS WITH OTHER CIRCULATORY COMPLICATION, WITHOUT LONG-TERM CURRENT USE OF INSULIN (HCC): Primary | ICD-10-CM

## 2022-05-27 DIAGNOSIS — Z09 HOSPITAL DISCHARGE FOLLOW-UP: ICD-10-CM

## 2022-05-27 DIAGNOSIS — J96.01 ACUTE HYPOXEMIC RESPIRATORY FAILURE DUE TO COVID-19 (HCC): ICD-10-CM

## 2022-05-27 DIAGNOSIS — Z12.5 PROSTATE CANCER SCREENING: ICD-10-CM

## 2022-05-27 DIAGNOSIS — Z13.0 SCREENING FOR DEFICIENCY ANEMIA: ICD-10-CM

## 2022-05-27 DIAGNOSIS — Z13.29 SCREENING FOR THYROID DISORDER: ICD-10-CM

## 2022-05-27 DIAGNOSIS — D64.9 ANEMIA, UNSPECIFIED TYPE: ICD-10-CM

## 2022-05-27 DIAGNOSIS — I50.20 HFREF (HEART FAILURE WITH REDUCED EJECTION FRACTION) (HCC): ICD-10-CM

## 2022-05-27 DIAGNOSIS — U07.1 ACUTE HYPOXEMIC RESPIRATORY FAILURE DUE TO COVID-19 (HCC): ICD-10-CM

## 2022-05-27 PROBLEM — R78.81 BACTEREMIA DUE TO GRAM-POSITIVE BACTERIA: Status: RESOLVED | Noted: 2022-05-19 | Resolved: 2022-05-27

## 2022-05-27 PROCEDURE — 1111F DSCHRG MED/CURRENT MED MERGE: CPT | Performed by: INTERNAL MEDICINE

## 2022-05-27 PROCEDURE — 99495 TRANSJ CARE MGMT MOD F2F 14D: CPT | Performed by: INTERNAL MEDICINE

## 2022-05-27 SDOH — ECONOMIC STABILITY: FOOD INSECURITY: WITHIN THE PAST 12 MONTHS, THE FOOD YOU BOUGHT JUST DIDN'T LAST AND YOU DIDN'T HAVE MONEY TO GET MORE.: NEVER TRUE

## 2022-05-27 SDOH — ECONOMIC STABILITY: FOOD INSECURITY: WITHIN THE PAST 12 MONTHS, YOU WORRIED THAT YOUR FOOD WOULD RUN OUT BEFORE YOU GOT MONEY TO BUY MORE.: NEVER TRUE

## 2022-05-27 ASSESSMENT — PATIENT HEALTH QUESTIONNAIRE - PHQ9
1. LITTLE INTEREST OR PLEASURE IN DOING THINGS: 0
SUM OF ALL RESPONSES TO PHQ9 QUESTIONS 1 & 2: 0
SUM OF ALL RESPONSES TO PHQ QUESTIONS 1-9: 0
2. FEELING DOWN, DEPRESSED OR HOPELESS: 0
SUM OF ALL RESPONSES TO PHQ QUESTIONS 1-9: 0

## 2022-05-27 ASSESSMENT — SOCIAL DETERMINANTS OF HEALTH (SDOH): HOW HARD IS IT FOR YOU TO PAY FOR THE VERY BASICS LIKE FOOD, HOUSING, MEDICAL CARE, AND HEATING?: NOT HARD AT ALL

## 2022-05-27 NOTE — ASSESSMENT & PLAN NOTE
Well-controlled, lifestyle modifications recommended   Patient currently diet controlled will provide education baseline diabetic eye exam microalbumin patient has not been aware that his diabetic

## 2022-06-02 ENCOUNTER — CARE COORDINATION (OUTPATIENT)
Dept: CASE MANAGEMENT | Age: 77
End: 2022-06-02

## 2022-06-08 ENCOUNTER — CARE COORDINATION (OUTPATIENT)
Dept: CASE MANAGEMENT | Age: 77
End: 2022-06-08

## 2022-08-05 ENCOUNTER — TELEPHONE (OUTPATIENT)
Dept: CARDIOLOGY CLINIC | Age: 77
End: 2022-08-05

## 2022-11-07 ENCOUNTER — TELEPHONE (OUTPATIENT)
Dept: CARDIOLOGY CLINIC | Age: 77
End: 2022-11-07

## 2022-11-23 ENCOUNTER — OFFICE VISIT (OUTPATIENT)
Dept: CARDIOLOGY CLINIC | Age: 77
End: 2022-11-23
Payer: OTHER GOVERNMENT

## 2022-11-23 VITALS
WEIGHT: 223 LBS | HEIGHT: 70 IN | OXYGEN SATURATION: 98 % | BODY MASS INDEX: 31.92 KG/M2 | DIASTOLIC BLOOD PRESSURE: 78 MMHG | HEART RATE: 84 BPM | SYSTOLIC BLOOD PRESSURE: 126 MMHG

## 2022-11-23 DIAGNOSIS — I48.0 PAF (PAROXYSMAL ATRIAL FIBRILLATION) (HCC): ICD-10-CM

## 2022-11-23 DIAGNOSIS — E78.2 MIXED HYPERLIPIDEMIA: ICD-10-CM

## 2022-11-23 DIAGNOSIS — Z86.79 S/P MAZE OPERATION FOR ATRIAL FIBRILLATION: ICD-10-CM

## 2022-11-23 DIAGNOSIS — Z98.890 HISTORY OF MITRAL VALVE REPAIR: ICD-10-CM

## 2022-11-23 DIAGNOSIS — I42.8 NONISCHEMIC CARDIOMYOPATHY (HCC): ICD-10-CM

## 2022-11-23 DIAGNOSIS — Z98.890 S/P MAZE OPERATION FOR ATRIAL FIBRILLATION: ICD-10-CM

## 2022-11-23 DIAGNOSIS — Z95.0 PACEMAKER: ICD-10-CM

## 2022-11-23 DIAGNOSIS — I10 ESSENTIAL HYPERTENSION: ICD-10-CM

## 2022-11-23 DIAGNOSIS — I25.10 CORONARY ARTERY DISEASE INVOLVING NATIVE CORONARY ARTERY OF NATIVE HEART WITHOUT ANGINA PECTORIS: Primary | ICD-10-CM

## 2022-11-23 PROCEDURE — 3078F DIAST BP <80 MM HG: CPT | Performed by: NURSE PRACTITIONER

## 2022-11-23 PROCEDURE — 93280 PM DEVICE PROGR EVAL DUAL: CPT | Performed by: NURSE PRACTITIONER

## 2022-11-23 PROCEDURE — 99214 OFFICE O/P EST MOD 30 MIN: CPT | Performed by: NURSE PRACTITIONER

## 2022-11-23 PROCEDURE — 3074F SYST BP LT 130 MM HG: CPT | Performed by: NURSE PRACTITIONER

## 2022-11-23 PROCEDURE — 1123F ACP DISCUSS/DSCN MKR DOCD: CPT | Performed by: NURSE PRACTITIONER

## 2022-11-23 NOTE — PROGRESS NOTES
[facility], [place]. [address], [place] [address] [address] [facility]  [phone] office  [phone] fax      OFFICE VISIT:  2022    Dee Rosa - : 1945  Reason For Visit:  Barbara Christina is a 69 y.o. male who is here for Follow-up (Coronary artery disease involving native coronary artery of native heart without angina pectoris. Patient has no cardiac complaints.)    History:   Diagnosis Orders   1. Coronary artery disease involving native coronary artery of native heart without angina pectoris        2. PAF (paroxysmal atrial fibrillation) ([address]        3. Pacemaker        4. S/P Maze operation for atrial fibrillation        5. Nonischemic cardiomyopathy ([address]        6. Essential hypertension        7. Mixed hyperlipidemia        8. History of mitral valve repair          The patient presents today for cardiology follow up and pacer check. He has multiple cardiac issues and in , a cardiac catheterization demonstrated severe LV systolic dysfunction with global hypokinesis and ejection fraction of 15-20%, severe mitral regurgitation. The patient also has a history of paroxysmal atrial fibrillation  and CAD. He underwent CABG and had mitral valve repair with an annuloplasty ring and single vein bypass graft to an obtuse marginal artery. During the surgery the patient  also had a Torrez maze procedure. He developed complete heart block requiring permanent pacemaker implantation. This is a gentleman who has wanted minimum testing and evaluation in the past.  In 2019, it was recommended that he have an echocardiogram but he refused. It was discussed again 6 months later and still refused. A 2D echocardiogram was recommended again today and the pateint refused. He is planning for a possible orthopedic procedure in the future. I informed patient that a 2D echo would need to be done for cardiac risk stratification.   The patient still refused cardiac testing. Mr. Flood Has reports doing very well. He remains active without decline in activity tolerance. The patient denies symptoms to suggest myocardial ischemia, heart failure or arrhythmia. BP is well controlled on current regimen. The patient's PCP monitors cholesterol. Gabriela Monroe denies exertional chest pain, shortness of breath, orthopnea, paroxysmal nocturnal dyspnea, syncope, presyncope, sensed arrhythmia, edema and fatigue. The patient denies numbness or weakness to suggest cerebrovascular accident or transient ischemic attack.       Mita Lewis has the following history as recorded in [facility]:  Patient Active Problem List   Diagnosis Code    Paroxysmal atrial fibrillation with rapid ventricular response ([facility]) I48.0    CHB (complete heart block) ([facility]) I44.2    Pacemaker Z95.0    Essential hypertension I10    Mitral regurgitation I34.0    HFrEF (heart failure with reduced ejection fraction) ([facility]) I50.20    CAD (coronary artery disease) I25.10    Mixed hyperlipidemia E78.2    Nonischemic cardiomyopathy ([facility]) I42.8    Acute hypoxemic respiratory failure due to COVID-19 ([facility] U07.1, J96.01    Type 2 diabetes mellitus with hyperglycemia ([facility]) E11.65    Calculus of gallbladder and bile duct with obstruction without cholecystitis K80.71     Past Medical History:   Diagnosis Date    Atrial fibrillation ([facility] 3/19/14    s/p cardioversion    Bacteremia due to Gram-positive bacteria 5/19/2022    CAD (coronary artery disease)     CHB (complete heart block) ([facility])     GERD (gastroesophageal reflux disease)     Hyperlipidemia     Hypertension     Ischemic dilated cardiomyopathy ([facility]     Mitral regurgitation     Pacemaker 3/31/14     Past Surgical History:   Procedure Laterality Date    CARDIAC CATHETERIZATION  3/19/14  MDL    EF15-20%    CARDIAC VALVE REPLACEMENT  03/24/2014    Combined Mitral Valve Repair w/ 30 mm Bryant Annuloplasty Ring, CABG X 1 - SVG-OM, Torrez-MAZE Radiofrequency Ablation (JPO)    CARDIOVERSION  3/19/14  MDL    CORONARY ARTERY BYPASS GRAFT  2014    Combined Mitral Valve Repair w/ #30mm Bryant Annuloplasty Ring, CABG X 1 - SVG-OM, Torrez-MAZE Radiofrequency Ablation (JPO)    PACEMAKER INSERTION  3/31/14  [facility]     Family History   Problem Relation Age of Onset    Heart Disease Other     Lung Cancer Father     Coronary Art Dis Brother     Coronary Art Dis Brother     High Blood Pressure Neg Hx     Cancer Neg Hx     Diabetes Neg Hx      Social History     Tobacco Use    Smoking status: Former     Packs/day: 1.00     Years: 3.00     Pack years: 3.00     Types: Cigarettes     Start date:      Quit date:      Years since quittin.9    Smokeless tobacco: Never   Substance Use Topics    Alcohol use: Yes     Comment: occ      Current Outpatient Medications   Medication Sig Dispense Refill    albuterol sulfate  (90 Base) MCG/ACT inhaler Inhale 2 puffs into the lungs 4 times daily 18 g 3    budesonide-formoterol (SYMBICORT) 160-4.5 MCG/ACT AERO Inhale 2 puffs into the lungs 2 times daily 10.2 g 3    pantoprazole (PROTONIX) 40 MG tablet Take 1 tablet by mouth daily 90 tablet 3    atorvastatin (LIPITOR) 20 MG tablet Take 1 tablet by mouth once daily 90 tablet 3    carvedilol (COREG) 12.5 MG tablet 1 bid 180 tablet 3    clopidogrel (PLAVIX) 75 MG tablet One daily 90 tablet 3    lisinopril (PRINIVIL;ZESTRIL) 40 MG tablet Take 1 tablet by mouth daily 90 tablet 3    sildenafil (VIAGRA) 100 MG tablet Take 1 tablet by mouth as needed for Erectile Dysfunction 16 tablet 2     No current facility-administered medications for this visit. Allergies: Patient has no known allergies. Review of Systems  Constitutional - no appetite change, or unexpected weight change. No fever, chills or diaphoresis. No significant change in activity level or new onset of fatigue. HEENT - no significant rhinorrhea or epistaxis. No tinnitus or significant hearing loss. Eyes - no sudden vision change or amaurosis. No corneal arcus, xantholasma, subconjunctival hemorrhage or discharge. Respiratory - no significant wheezing, stridor, apnea or cough. No dyspnea on exertion or shortness of air. Cardiovascular - no exertional chest pain to suggest myocardial ischemia. No orthopnea or PND. No sensation of sustained arrythmia. No occurrence of slow heart rate. No palpitations. No claudication. Gastrointestinal - no abdominal swelling or pain. No blood in stool. No severe constipation, diarrhea, nausea, or vomiting. Genitourinary - no dysuria, frequency, or urgency. No flank pain or hematuria. Musculoskeletal - no back pain or myalgia. No problems with gait. Extremities - no clubbing, cyanosis or extremity edema. Skin - no color change or rash. No pallor. No new surgical incision. Neurologic - no speech difficulty, facial asymmetry or lateralizing weakness. No seizures, presyncope or syncope. No significant dizziness. Hematologic - no easy bruising or excessive bleeding. Psychiatric - no severe anxiety or insomnia. No confusion. All other review of systems are negative. Objective  Vital Signs - /78   Pulse 84   Ht 5' 10\" (1.778 m)   Wt 223 lb (101.2 kg)   SpO2 98%   BMI 32.00 kg/m²   General - Neomia Noon is alert, cooperative, and pleasant. Well groomed. No acute distress. Body habitus - Body mass index is 32 kg/m². HEENT - Head is normocephalic. No circumoral cyanosis. Dentition is normal.  EYES -   Lids normal without ptosis. No discharge, edema or subconjunctival hemorrhage. Neck - Symmetrical without apparent mass or lymphadenopathy. Respiratory - Normal respiratory effort without use of accessory muscles. Ausculatation reveals vesicular breath sounds without crackles, wheezes, rub or rhonchi. Cardiovascular - No jugular venous distention. Auscultation reveals regular rate and rhythm. No audible clicks, gallop or rub.   No murmur. No lower extremity varicosities. No carotid bruits. Abdominal -  No visible distention, mass or pulsations. Extremities - No clubbing or cyanosis. No statis dermatitis or ulcers. No edema. Musculoskeletal -   No Osler's nodes. No kyphosis or scoliosis. Gait is even and regular without limp or shuffle. Ambulates without assistance. Skin -  Warm and dry; no rash or pallor. No new surgical wound. Neurological - No focal neurological deficits. Thought processes coherent. No apparent tremor. Oriented to person, place and time. Psychiatric -  Appropriate affect and mood.      Data reviewed:  Lab Results   Component Value Date    WBC 7.2 05/20/2022    HGB 10.9 (L) 05/20/2022    HCT 33.1 (L) 05/20/2022    MCV 94.6 (H) 05/20/2022     05/20/2022     Lab Results   Component Value Date     05/20/2022    K 4.6 05/20/2022     05/20/2022    CO2 19 (L) 05/20/2022    BUN 21 05/20/2022    CREATININE 0.9 05/20/2022    GLUCOSE 202 (H) 05/20/2022    CALCIUM 8.6 (L) 05/20/2022    PROT 5.7 (L) 05/20/2022    LABALBU 3.4 (L) 05/20/2022    BILITOT 0.3 05/20/2022    ALKPHOS 38 (L) 05/20/2022    AST 64 (H) 05/20/2022    ALT 87 (H) 05/20/2022    LABGLOM >60 05/20/2022    GFRAA >59 05/20/2022    GLOB 2.7 11/18/2016       Lab Results   Component Value Date    CHOL 111 (L) 04/07/2021    CHOL 116 (L) 02/27/2019    CHOL 117 (L) 08/16/2017     Lab Results   Component Value Date    TRIG 99 04/07/2021    TRIG 128 02/27/2019    TRIG 132 (L) 08/16/2017     Lab Results   Component Value Date    HDL 44 (L) 04/07/2021    HDL 47 (L) 02/27/2019    HDL 49 (L) 08/16/2017     Lab Results   Component Value Date    LDLCALC 47 04/07/2021    LDLCALC 43 02/27/2019    LDLCALC 42 08/16/2017     BP Readings from Last 3 Encounters:   11/23/22 126/78   05/27/22 122/62   05/20/22 138/62    Pulse Readings from Last 3 Encounters:   11/23/22 84   05/27/22 73   05/20/22 72        Wt Readings from Last 3 Encounters:   11/23/22 223 lb (101.2 kg)   05/27/22 220 lb (99.8 kg)   05/17/22 200 lb 12.8 oz (91.1 kg)     Assessment/Plan:   Diagnosis Orders   1. Coronary artery disease involving native coronary artery of native heart without angina pectoris        2. PAF (paroxysmal atrial fibrillation) ([facility]        3. Pacemaker        4. S/P Maze operation for atrial fibrillation        5. Nonischemic cardiomyopathy ([facility]        6. Essential hypertension        7. Mixed hyperlipidemia        8. History of mitral valve repair          [id number] Score: 4  Disclaimer: Risk Score calculation is dependent on accuracy of patient problem list and past encounter diagnosis. Pacer check:  Pacemaker check showed adequate battery status @ 2.75 V. Mode: DDDR. Lead impedances are stable. Pacing:  AP-VS 24.2%. Reprogramming for sensitivity and threshold testing. Appropriate diagnostics and safety margins noted. A output 0.375 V at 0.40 ms; P wave 1.00-1.40 mV. V output 0.625 V at 0.40 ms, R wave 22.40-31.36 mV. Sustained arrythmia:  none. Next [facility] remote transmission: 3 months. Stable CV status without overt heart failure, sensed arrhythmia or angina. CAD s/p CABG - stable on current medical management. Hx non ischemic CM - no symptoms of overt heart failure. HTN - normotensive on current regimen. BP today 126/78. Continue same. Hx mitral valve repair - recommended follow up echocardiogram.  Patient declined. No cardiac murmur auscultated. Hyperlipidemia - monitored by PCP. Continues on Lipitor 20 mg daily. PAF - pacer check showed no AF episodes. Patient had MAZE ablation in the past.    Patient is compliant with medication regimen. Previous cardiac history and records reviewed. Continue current medical management for cardiac related condition. Continue other current medications as directed. Continue to follow up with primary care provider for non cardiac medical problems.   If your primary care provider is outside of the [facility], please request that your labs be faxed to this office at [phone]. BP goal 130/80 or less. Call the office with any problems, questions or concerns at [phone]. Cardiology follow up as scheduled in [address] appointments. Educational included in patient instructions. Heart health.       Nadia Hill, APRN

## 2022-11-23 NOTE — PATIENT INSTRUCTIONS
New instructions for today:    Patient Instructions:  Continue current medications as prescribed. Always keep a current medication list. Bring your medications to every office visit. Continue to follow up with primary care provider for non cardiac medical problems. Call the office with any problems, questions or concerns at [phone]. If you have been asked to keep a blood pressure log, do so for 2 weeks. Call the office to report readings to the triage nurse at [phone]. Follow up with cardiologist as scheduled. The following educational material has been included in this after visit summary for your review: Life simple 7. Heart health. Life simple 7  1) Manage blood pressure - high blood pressure is a major risk factor for heart disease and stroke. Keeping blood pressure in health range reduces strain on your heart, arteries and kidneys. Blood pressure goal is less than 130/80. 2) Control cholesterol - contributes to plaque, which can clog arteries and lead to heart disease and stroke. When you control your cholesterol you are giving your arteries their best chance to remain clear. It is recommended that you get cholesterol lab work done once a year. 3) Reduce blood sugar - most of the food we eat is turning into glucose or blood sugar that our body uses for energy. Over time, high levels of blood sugar can damage your heart, kidneys, eyes and nerves. 4) Get active - living an active life is one of the most rewarding gifts you can give yourself and those you love. Simply put, daily physical activity increases your length and quality of life. Strive to exercise 15 minutes most days of the week. 5)  Eat better - A healthy diet is one of your best weapons for fighting cardiovascular disease. When you eat a heart healthy diet, you improve your chances for feeling good and staying healthy for life.   6)  Lose weight - when you shed extra fat an unnecessary pounds, you reduce the burden on your hear, lungs, blood vessels and skeleton. You give yourself the gift of active living, you lower your blood pressure and help yourself feel better. 7) Stop smoking - cigarette smokers have a higher risk of developing cardiovascular disease. If  You smoke, quitting is the best thing you can do for your health. Check American Heart Association on line for more information on Life's Simple 7 and tips for healthy living. A Healthy Heart: Care Instructions  Your Care Instructions     Coronary artery disease, also called heart disease, occurs when a substance called plaque builds up in the vessels that supply oxygen-rich blood to your heart muscle. This can narrow the blood vessels and reduce blood flow. A heart attack happens when blood flow is completely blocked. A high-fat diet, smoking, and other factors increase the risk of heart disease. Your doctor has found that you have a chance of having heart disease. You can do lots of things to keep your heart healthy. It may not be easy, but you can change your diet, exercise more, and quit smoking. These steps really work to lower your chance of heart disease. Follow-up care is a key part of your treatment and safety. Be sure to make and go to all appointments, and call your doctor if you are having problems. It's also a good idea to know your test results and keep a list of the medicines you take. How can you care for yourself at home? Diet  Use less salt when you cook and eat. This helps lower your blood pressure. Taste food before salting. Add only a little salt when you think you need it. With time, your taste buds will adjust to less salt. Eat fewer snack items, fast foods, canned soups, and other high-salt, high-fat, processed foods. Read food labels and try to avoid saturated and trans fats. They increase your risk of heart disease by raising cholesterol levels. Limit the amount of solid fat-butter, margarine, and shortening-you eat.  Use olive, peanut, or canola oil when you cook. Bake, broil, and steam foods instead of frying them. Eat a variety of fruit and vegetables every day. Dark green, deep orange, red, or yellow fruits and vegetables are especially good for you. Examples include spinach, carrots, peaches, and berries. Foods high in fiber can reduce your cholesterol and provide important vitamins and minerals. High-fiber foods include whole-grain cereals and breads, oatmeal, beans, brown rice, citrus fruits, and apples. Eat lean proteins. Heart-healthy proteins include seafood, lean meats and poultry, eggs, beans, peas, nuts, seeds, and soy products. Limit drinks and foods with added sugar. These include candy, desserts, and soda pop. Lifestyle changes  If your doctor recommends it, get more exercise. Walking is a good choice. Bit by bit, increase the amount you walk every day. Try for at least 30 minutes on most days of the week. You also may want to swim, bike, or do other activities. Do not smoke. If you need help quitting, talk to your doctor about stop-smoking programs and medicines. These can increase your chances of quitting for good. Quitting smoking may be the most important step you can take to protect your heart. It is never too late to quit. Limit alcohol to 2 drinks a day for men and 1 drink a day for women. Too much alcohol can cause health problems. Manage other health problems such as diabetes, high blood pressure, and high cholesterol. If you think you may have a problem with alcohol or drug use, talk to your doctor. Medicines  Take your medicines exactly as prescribed. Call your doctor if you think you are having a problem with your medicine. If your doctor recommends aspirin, take the amount directed each day. Make sure you take aspirin and not another kind of pain reliever, such as acetaminophen (Tylenol). When should you call for help? [id number] if you have symptoms of a heart attack.  These may include:  Chest pain or pressure, or a strange feeling in the chest.  Sweating. Shortness of breath. Pain, pressure, or a strange feeling in the back, neck, jaw, or upper belly or in one or both shoulders or arms. Lightheadedness or sudden weakness. A fast or irregular heartbeat. After you call 911, the  may tell you to chew 1 adult-strength or 2 to 4 low-dose aspirin. Wait for an ambulance. Do not try to drive yourself. Watch closely for changes in your health, and be sure to contact your doctor if you have any problems. Where can you learn more? Go to https://[email or web address]. org and sign in to your [employer] account. Enter D949 in the [employer] box to learn more about \"A Healthy Heart: Care Instructions. \"     If you do not have an account, please click on the \"Sign Up Now\" link. Current as of: December 16, 2019               Content Version: 12.5  © 1112-1564 Healthwise, Incorporated. Care instructions adapted under license by [facility] [place] ([facility]). If you have questions about a medical condition or this instruction, always ask your healthcare professional. [place] [address] any warranty or liability for your use of this information.

## 2022-12-20 ENCOUNTER — TELEPHONE (OUTPATIENT)
Dept: CARDIOLOGY CLINIC | Age: 77
End: 2022-12-20

## 2022-12-20 NOTE — TELEPHONE ENCOUNTER
Date: 2/5/22    Cardiologist: only NP since Kailyn Yan    Procedure: right knee scope    Surgeon: Mary Anne Linda    Last Office Visit: 11/23/22  Reason for office visit and medical concerns addressed at this office visit: cad, chb, htn, hyperlipdiemia, cardiomyopathy, pacemaker, afib, dm,     Testing Performed and Date of Service:  none    RCRI = 6.6   METs 4    Current Medications: albuterol, symbicort, protonix, atorvastatin, coreg, plavix, lisinopril, viagra,     Is the patient currently taking an anticoagulant?  If so, what is the diagnosis the patient has been given to warrant the need for the anticoagulant? plavix    Additional Notes: requesting cardiac clearance and to hold plavix prior to procedure

## 2022-12-20 NOTE — TELEPHONE ENCOUNTER
Okay to send letter for cardiac risk stratification.   Okay to hold Plavix 5 to 7 days prior to procedure and resume thereafter

## 2023-02-27 ENCOUNTER — TELEPHONE (OUTPATIENT)
Dept: CARDIOLOGY CLINIC | Age: 78
End: 2023-02-27

## 2023-03-28 ENCOUNTER — TELEPHONE (OUTPATIENT)
Dept: CARDIOLOGY CLINIC | Age: 78
End: 2023-03-28

## 2023-04-19 DIAGNOSIS — E78.5 HYPERLIPIDEMIA, UNSPECIFIED HYPERLIPIDEMIA TYPE: ICD-10-CM

## 2023-04-20 RX ORDER — LISINOPRIL 40 MG/1
TABLET ORAL
Qty: 90 TABLET | Refills: 3 | Status: SHIPPED | OUTPATIENT
Start: 2023-04-20

## 2023-04-20 RX ORDER — ATORVASTATIN CALCIUM 20 MG/1
TABLET, FILM COATED ORAL
Qty: 90 TABLET | Refills: 0 | Status: SHIPPED | OUTPATIENT
Start: 2023-04-20

## 2023-04-20 RX ORDER — CLOPIDOGREL BISULFATE 75 MG/1
TABLET ORAL
Qty: 90 TABLET | Refills: 3 | Status: SHIPPED | OUTPATIENT
Start: 2023-04-20

## 2023-04-20 RX ORDER — CARVEDILOL 12.5 MG/1
TABLET ORAL
Qty: 180 TABLET | Refills: 3 | Status: SHIPPED | OUTPATIENT
Start: 2023-04-20

## 2023-04-26 ENCOUNTER — TELEPHONE (OUTPATIENT)
Dept: CARDIOLOGY CLINIC | Age: 78
End: 2023-04-26

## 2023-05-17 ENCOUNTER — TELEPHONE (OUTPATIENT)
Dept: INTERNAL MEDICINE | Age: 78
End: 2023-05-17

## 2023-05-17 PROBLEM — M25.461 EFFUSION OF RIGHT KNEE JOINT: Status: ACTIVE | Noted: 2023-05-17

## 2023-05-17 PROBLEM — S83.249A TEAR OF MEDIAL MENISCUS OF KNEE: Status: ACTIVE | Noted: 2023-05-17

## 2023-05-17 RX ORDER — CYCLOBENZAPRINE HCL 5 MG
5 TABLET ORAL NIGHTLY
COMMUNITY
Start: 2023-04-19

## 2023-05-17 NOTE — TELEPHONE ENCOUNTER
----- Message from Radha Vincent sent at 5/17/2023  2:13 PM CDT -----  Subject: Message to Provider    QUESTIONS  Information for Provider? Patient would like to know if routine bloodwork   can be ordered prior to his visit on 05/22/23.  ---------------------------------------------------------------------------  --------------  [employer]  [phone]; OK to leave message on voicemail  ---------------------------------------------------------------------------  --------------  SCRIPT ANSWERS  Relationship to Patient?  Self

## 2023-05-18 ENCOUNTER — OFFICE VISIT (OUTPATIENT)
Dept: PRIMARY CARE CLINIC | Age: 78
End: 2023-05-18
Payer: OTHER GOVERNMENT

## 2023-05-18 VITALS
WEIGHT: 205 LBS | TEMPERATURE: 97.6 F | HEIGHT: 70 IN | BODY MASS INDEX: 29.35 KG/M2 | RESPIRATION RATE: 18 BRPM | OXYGEN SATURATION: 98 % | HEART RATE: 87 BPM | DIASTOLIC BLOOD PRESSURE: 72 MMHG | SYSTOLIC BLOOD PRESSURE: 126 MMHG

## 2023-05-18 DIAGNOSIS — Z12.5 SCREENING FOR PROSTATE CANCER: ICD-10-CM

## 2023-05-18 DIAGNOSIS — E78.2 MIXED HYPERLIPIDEMIA: ICD-10-CM

## 2023-05-18 DIAGNOSIS — Z00.00 ANNUAL PHYSICAL EXAM: Primary | ICD-10-CM

## 2023-05-18 DIAGNOSIS — K21.9 GASTROESOPHAGEAL REFLUX DISEASE, UNSPECIFIED WHETHER ESOPHAGITIS PRESENT: ICD-10-CM

## 2023-05-18 DIAGNOSIS — E11.65 TYPE 2 DIABETES MELLITUS WITH HYPERGLYCEMIA, WITHOUT LONG-TERM CURRENT USE OF INSULIN (HCC): ICD-10-CM

## 2023-05-18 DIAGNOSIS — M79.10 MYALGIA: ICD-10-CM

## 2023-05-18 PROCEDURE — 99397 PER PM REEVAL EST PAT 65+ YR: CPT | Performed by: FAMILY MEDICINE

## 2023-05-18 PROCEDURE — 3078F DIAST BP <80 MM HG: CPT | Performed by: FAMILY MEDICINE

## 2023-05-18 PROCEDURE — 3074F SYST BP LT 130 MM HG: CPT | Performed by: FAMILY MEDICINE

## 2023-05-18 ASSESSMENT — ENCOUNTER SYMPTOMS
FACIAL SWELLING: 0
VOMITING: 0
NAUSEA: 0
COLOR CHANGE: 0
APNEA: 0
STRIDOR: 0
EYE ITCHING: 0
EYE DISCHARGE: 0
BACK PAIN: 0

## 2023-05-18 ASSESSMENT — PATIENT HEALTH QUESTIONNAIRE - PHQ9
2. FEELING DOWN, DEPRESSED OR HOPELESS: 0
SUM OF ALL RESPONSES TO PHQ QUESTIONS 1-9: 0
SUM OF ALL RESPONSES TO PHQ9 QUESTIONS 1 & 2: 0
SUM OF ALL RESPONSES TO PHQ QUESTIONS 1-9: 0
SUM OF ALL RESPONSES TO PHQ QUESTIONS 1-9: 0
1. LITTLE INTEREST OR PLEASURE IN DOING THINGS: 0
SUM OF ALL RESPONSES TO PHQ QUESTIONS 1-9: 0

## 2023-05-18 NOTE — PROGRESS NOTES
All patient questions answered. Pt voiced understanding. Reviewed health maintenance. Instructed to continue current medications, diet and exercise. Patient agreedwith treatment plan. Follow up as directed. Old records reviewed, where available.     Salvador Valderrama MD    Note:  dictated using Dragon software

## 2023-05-19 DIAGNOSIS — M79.10 MYALGIA: ICD-10-CM

## 2023-05-19 DIAGNOSIS — E78.2 MIXED HYPERLIPIDEMIA: ICD-10-CM

## 2023-05-19 DIAGNOSIS — Z12.5 SCREENING FOR PROSTATE CANCER: ICD-10-CM

## 2023-05-19 DIAGNOSIS — E11.65 TYPE 2 DIABETES MELLITUS WITH HYPERGLYCEMIA, WITHOUT LONG-TERM CURRENT USE OF INSULIN (HCC): ICD-10-CM

## 2023-05-19 LAB
ALBUMIN SERPL-MCNC: 4 G/DL (ref 3.5–5.2)
ALP SERPL-CCNC: 56 U/L (ref 40–130)
ALT SERPL-CCNC: 21 U/L (ref 5–41)
ANION GAP SERPL CALCULATED.3IONS-SCNC: 9 MMOL/L (ref 7–19)
AST SERPL-CCNC: 18 U/L (ref 5–40)
BASOPHILS # BLD: 0 K/UL (ref 0–0.2)
BASOPHILS NFR BLD: 0.6 % (ref 0–1)
BILIRUB SERPL-MCNC: 0.6 MG/DL (ref 0.2–1.2)
BUN SERPL-MCNC: 12 MG/DL (ref 8–23)
CALCIUM SERPL-MCNC: 9.5 MG/DL (ref 8.8–10.2)
CHLORIDE SERPL-SCNC: 102 MMOL/L (ref 98–111)
CHOLEST SERPL-MCNC: 103 MG/DL (ref 160–199)
CO2 SERPL-SCNC: 26 MMOL/L (ref 22–29)
CREAT SERPL-MCNC: 0.9 MG/DL (ref 0.5–1.2)
CREAT UR-MCNC: 60.8 MG/DL (ref 4.2–622)
EOSINOPHIL # BLD: 0.2 K/UL (ref 0–0.6)
EOSINOPHIL NFR BLD: 2.7 % (ref 0–5)
ERYTHROCYTE [DISTWIDTH] IN BLOOD BY AUTOMATED COUNT: 12.8 % (ref 11.5–14.5)
FERRITIN SERPL-MCNC: 113.7 NG/ML (ref 30–400)
GLUCOSE SERPL-MCNC: 225 MG/DL (ref 74–109)
HBA1C MFR BLD: 9.2 % (ref 4–6)
HCT VFR BLD AUTO: 40.3 % (ref 42–52)
HDLC SERPL-MCNC: 43 MG/DL (ref 55–121)
HGB BLD-MCNC: 13.4 G/DL (ref 14–18)
IMM GRANULOCYTES # BLD: 0 K/UL
IRON SERPL-MCNC: 67 UG/DL (ref 59–158)
LDLC SERPL CALC-MCNC: 43 MG/DL
LYMPHOCYTES # BLD: 1.9 K/UL (ref 1.1–4.5)
LYMPHOCYTES NFR BLD: 27.4 % (ref 20–40)
MCH RBC QN AUTO: 31.5 PG (ref 27–31)
MCHC RBC AUTO-ENTMCNC: 33.3 G/DL (ref 33–37)
MCV RBC AUTO: 94.8 FL (ref 80–94)
MICROALBUMIN UR-MCNC: <1.2 MG/DL (ref 0–19)
MICROALBUMIN/CREAT UR-RTO: NORMAL MG/G
MONOCYTES # BLD: 0.5 K/UL (ref 0–0.9)
MONOCYTES NFR BLD: 7.2 % (ref 0–10)
NEUTROPHILS # BLD: 4.2 K/UL (ref 1.5–7.5)
NEUTS SEG NFR BLD: 61.8 % (ref 50–65)
PLATELET # BLD AUTO: 224 K/UL (ref 130–400)
PMV BLD AUTO: 10.8 FL (ref 9.4–12.4)
POTASSIUM SERPL-SCNC: 4.9 MMOL/L (ref 3.5–5)
PROT SERPL-MCNC: 6.8 G/DL (ref 6.6–8.7)
PSA SERPL-MCNC: 0.94 NG/ML (ref 0–4)
RBC # BLD AUTO: 4.25 M/UL (ref 4.7–6.1)
SODIUM SERPL-SCNC: 137 MMOL/L (ref 136–145)
TRIGL SERPL-MCNC: 86 MG/DL (ref 0–149)
WBC # BLD AUTO: 6.8 K/UL (ref 4.8–10.8)

## 2023-05-23 ENCOUNTER — TELEPHONE (OUTPATIENT)
Dept: PRIMARY CARE CLINIC | Age: 78
End: 2023-05-23

## 2023-05-23 ENCOUNTER — TELEPHONE (OUTPATIENT)
Dept: CARDIOLOGY CLINIC | Age: 78
End: 2023-05-23

## 2023-05-23 DIAGNOSIS — E11.65 TYPE 2 DIABETES MELLITUS WITH HYPERGLYCEMIA, WITHOUT LONG-TERM CURRENT USE OF INSULIN (HCC): Primary | ICD-10-CM

## 2023-05-23 NOTE — TELEPHONE ENCOUNTER
----- Message from Gabino Girard MD sent at 5/23/2023 10:05 AM CDT -----  His hemoglobin A1c was pretty high at 9.2. I would recommend he restart the metformin. If he is okay with that, I will send the prescription. If he has questions, would recommend he make an appointment to discuss further.

## 2023-06-21 ENCOUNTER — TELEPHONE (OUTPATIENT)
Dept: CARDIOLOGY CLINIC | Age: 78
End: 2023-06-21

## 2023-06-21 NOTE — TELEPHONE ENCOUNTER
Called patient reminding to send carelink remote Pacemaker interrogation  No answer or Voicemail option

## 2023-07-16 DIAGNOSIS — E78.5 HYPERLIPIDEMIA, UNSPECIFIED HYPERLIPIDEMIA TYPE: ICD-10-CM

## 2023-07-17 RX ORDER — ATORVASTATIN CALCIUM 20 MG/1
TABLET, FILM COATED ORAL
Qty: 90 TABLET | Refills: 1 | Status: SHIPPED | OUTPATIENT
Start: 2023-07-17

## 2023-07-17 RX ORDER — PANTOPRAZOLE SODIUM 40 MG/1
TABLET, DELAYED RELEASE ORAL
Qty: 90 TABLET | Refills: 3 | Status: SHIPPED | OUTPATIENT
Start: 2023-07-17

## 2023-07-19 ENCOUNTER — TELEPHONE (OUTPATIENT)
Dept: CARDIOLOGY CLINIC | Age: 78
End: 2023-07-19

## 2023-07-19 DIAGNOSIS — I42.8 NONISCHEMIC CARDIOMYOPATHY (HCC): ICD-10-CM

## 2023-07-19 DIAGNOSIS — I25.10 CORONARY ARTERY DISEASE INVOLVING NATIVE CORONARY ARTERY OF NATIVE HEART WITHOUT ANGINA PECTORIS: Primary | ICD-10-CM

## 2023-07-19 DIAGNOSIS — I34.0 MITRAL VALVE INSUFFICIENCY, UNSPECIFIED ETIOLOGY: ICD-10-CM

## 2023-07-19 NOTE — TELEPHONE ENCOUNTER
Cindy Vazquez,  This patient had CABG and MVR in 2014 with no follow up echo in 2014. His EF was 10-15% at that time. He needs a 2D echo completed for before clearance.   Thanks [employer]

## 2023-07-19 NOTE — TELEPHONE ENCOUNTER
Order placed. Pt called but no answer and no way to leave message. Dr. Matt Traylor office notified.

## 2023-07-19 NOTE — TELEPHONE ENCOUNTER
Date: 8-21-23    Cardiologist: Dr. Magalis Lopez     Procedure: Right TKR    Surgeon: Dr. Joaquina Szymanski     Last Office Visit: 11-23-22    Reason for office visit and medical concerns addressed at this office visit: CAD, PAF, CHF, HTN, hyperlipidemia, complete heart block    Testing Performed and Date of Service:  EKG 5-17-22    RCRI = 2 pts, elevated, 6.6%   METs 4    Current Medications: lipitor, protonix, metformin, flexeril, plavix, lisinopril, coreg, albuterol, viagra    Is the patient currently taking an anticoagulant? If so, what is the diagnosis the patient has been given to warrant the need for the anticoagulant?  Plavix     Additional Notes: Cardiac Risk Request and med hold on Plavix

## 2023-07-28 ENCOUNTER — HOSPITAL ENCOUNTER (OUTPATIENT)
Dept: NON INVASIVE DIAGNOSTICS | Age: 78
Discharge: HOME | End: 2023-07-28

## 2023-07-28 DIAGNOSIS — I34.0 MITRAL VALVE INSUFFICIENCY, UNSPECIFIED ETIOLOGY: ICD-10-CM

## 2023-07-28 DIAGNOSIS — I42.8 NONISCHEMIC CARDIOMYOPATHY (HCC): ICD-10-CM

## 2023-07-28 DIAGNOSIS — I25.10 CORONARY ARTERY DISEASE INVOLVING NATIVE CORONARY ARTERY OF NATIVE HEART WITHOUT ANGINA PECTORIS: ICD-10-CM

## 2023-07-28 LAB
LV EF: 40 %
LVEF MODALITY: NORMAL

## 2023-07-28 PROCEDURE — 6360000004 HC RX CONTRAST MEDICATION: Performed by: NURSE PRACTITIONER

## 2023-07-28 PROCEDURE — C8929 TTE W OR WO FOL WCON,DOPPLER: HCPCS

## 2023-07-28 RX ADMIN — PERFLUTREN 1.5 ML: 6.52 INJECTION, SUSPENSION INTRAVENOUS at 11:34

## 2023-08-03 ENCOUNTER — HOSPITAL ENCOUNTER (OUTPATIENT)
Dept: PREADMISSION TESTING | Age: 78
Discharge: HOME | End: 2023-08-07
Payer: COMMERCIAL

## 2023-08-03 VITALS — BODY MASS INDEX: 30.06 KG/M2 | HEIGHT: 70 IN | WEIGHT: 210 LBS

## 2023-08-03 LAB
ALBUMIN SERPL-MCNC: 4.4 G/DL (ref 3.5–5.2)
ALP SERPL-CCNC: 49 U/L (ref 40–130)
ALT SERPL-CCNC: 26 U/L (ref 5–41)
ANION GAP SERPL CALCULATED.3IONS-SCNC: 10 MMOL/L (ref 7–19)
APTT PPP: 28.8 SEC (ref 26–36.2)
AST SERPL-CCNC: 22 U/L (ref 5–40)
BASOPHILS # BLD: 0 K/UL (ref 0–0.2)
BASOPHILS NFR BLD: 0.4 % (ref 0–1)
BILIRUB SERPL-MCNC: 0.6 MG/DL (ref 0.2–1.2)
BILIRUB UR QL STRIP: NEGATIVE
BUN SERPL-MCNC: 13 MG/DL (ref 8–23)
CALCIUM SERPL-MCNC: 10.2 MG/DL (ref 8.8–10.2)
CHLORIDE SERPL-SCNC: 103 MMOL/L (ref 98–111)
CLARITY UR: CLEAR
CO2 SERPL-SCNC: 27 MMOL/L (ref 22–29)
COLOR UR: YELLOW
CREAT SERPL-MCNC: 1 MG/DL (ref 0.5–1.2)
EKG P AXIS: 70 DEGREES
EKG P-R INTERVAL: 152 MS
EKG Q-T INTERVAL: 368 MS
EKG QRS DURATION: 84 MS
EKG QTC CALCULATION (BAZETT): 394 MS
EKG T AXIS: 6 DEGREES
EOSINOPHIL # BLD: 0.2 K/UL (ref 0–0.6)
EOSINOPHIL NFR BLD: 2.6 % (ref 0–5)
ERYTHROCYTE [DISTWIDTH] IN BLOOD BY AUTOMATED COUNT: 13.2 % (ref 11.5–14.5)
GLUCOSE SERPL-MCNC: 138 MG/DL (ref 74–109)
GLUCOSE UR STRIP.AUTO-MCNC: NEGATIVE MG/DL
HBA1C MFR BLD: 6.9 % (ref 4–6)
HCT VFR BLD AUTO: 39.8 % (ref 42–52)
HGB BLD-MCNC: 13.3 G/DL (ref 14–18)
HGB UR STRIP.AUTO-MCNC: NEGATIVE MG/L
IMM GRANULOCYTES # BLD: 0 K/UL
INR PPP: 1.06 (ref 0.88–1.18)
KETONES UR STRIP.AUTO-MCNC: ABNORMAL MG/DL
LEUKOCYTE ESTERASE UR QL STRIP.AUTO: NEGATIVE
LYMPHOCYTES # BLD: 1.8 K/UL (ref 1.1–4.5)
LYMPHOCYTES NFR BLD: 24.8 % (ref 20–40)
MCH RBC QN AUTO: 31.9 PG (ref 27–31)
MCHC RBC AUTO-ENTMCNC: 33.4 G/DL (ref 33–37)
MCV RBC AUTO: 95.4 FL (ref 80–94)
MONOCYTES # BLD: 0.6 K/UL (ref 0–0.9)
MONOCYTES NFR BLD: 8.1 % (ref 0–10)
MRSA DNA SPEC QL NAA+PROBE: NOT DETECTED
NEUTROPHILS # BLD: 4.6 K/UL (ref 1.5–7.5)
NEUTS SEG NFR BLD: 63.5 % (ref 50–65)
NITRITE UR QL STRIP.AUTO: NEGATIVE
PH UR STRIP.AUTO: 5.5 [PH] (ref 5–8)
PLATELET # BLD AUTO: 247 K/UL (ref 130–400)
PMV BLD AUTO: 10.7 FL (ref 9.4–12.4)
POTASSIUM SERPL-SCNC: 5 MMOL/L (ref 3.5–5)
PROT SERPL-MCNC: 7.4 G/DL (ref 6.6–8.7)
PROT UR STRIP.AUTO-MCNC: NEGATIVE MG/DL
PROTHROMBIN TIME: 13.5 SEC (ref 12–14.6)
RBC # BLD AUTO: 4.17 M/UL (ref 4.7–6.1)
SODIUM SERPL-SCNC: 140 MMOL/L (ref 136–145)
SP GR UR STRIP.AUTO: 1.02 (ref 1–1.03)
UROBILINOGEN UR STRIP.AUTO-MCNC: 1 E.U./DL
WBC # BLD AUTO: 7.3 K/UL (ref 4.8–10.8)

## 2023-08-03 PROCEDURE — 93010 ELECTROCARDIOGRAM REPORT: CPT | Performed by: INTERNAL MEDICINE

## 2023-08-03 PROCEDURE — 85730 THROMBOPLASTIN TIME PARTIAL: CPT

## 2023-08-03 PROCEDURE — 85610 PROTHROMBIN TIME: CPT

## 2023-08-03 PROCEDURE — 83036 HEMOGLOBIN GLYCOSYLATED A1C: CPT

## 2023-08-03 PROCEDURE — 80053 COMPREHEN METABOLIC PANEL: CPT

## 2023-08-03 PROCEDURE — 87641 MR-STAPH DNA AMP PROBE: CPT

## 2023-08-03 PROCEDURE — 93005 ELECTROCARDIOGRAM TRACING: CPT | Performed by: ORTHOPAEDIC SURGERY

## 2023-08-03 PROCEDURE — 85025 COMPLETE CBC W/AUTO DIFF WBC: CPT

## 2023-08-03 PROCEDURE — 81003 URINALYSIS AUTO W/O SCOPE: CPT

## 2023-08-03 RX ORDER — DEXAMETHASONE SODIUM PHOSPHATE 10 MG/ML
10 INJECTION, SOLUTION INTRAMUSCULAR; INTRAVENOUS ONCE
OUTPATIENT
Start: 2023-08-21

## 2023-08-03 RX ORDER — OXYCODONE HCL 10 MG/1
10 TABLET, FILM COATED, EXTENDED RELEASE ORAL ONCE
OUTPATIENT
Start: 2023-08-21

## 2023-08-03 RX ORDER — ACETAMINOPHEN 500 MG
1000 TABLET ORAL ONCE
OUTPATIENT
Start: 2023-08-21

## 2023-08-03 RX ORDER — CELECOXIB 200 MG/1
200 CAPSULE ORAL ONCE
OUTPATIENT
Start: 2023-08-21

## 2023-08-03 RX ORDER — PREGABALIN 75 MG/1
75 CAPSULE ORAL ONCE
OUTPATIENT
Start: 2023-08-21

## 2023-08-03 NOTE — DISCHARGE INSTRUCTIONS
you do not receive a phone call by 4 PM the day before your surgery please call [phone] and let them know you have not received an arrival time. If your surgery is on Monday, your call will be on the Friday before your Monday surgery. MEDICATION INSTRUCTIONS PRIOR TO YOUR SURGERY    Night before surgery:      ________Do not take Metformin (you will not be eating or drinking after midnight)      The morning of surgery:  DO NOT TAKE LISINOPRIL    Take your prescribed betablocker  CARVEDILOL      with a sip of water the morning of surgery. OTHERWISE, You can take all your usual prescribed medications with a small sip of water. BUT DO NOT TAKE ANY DIABETIC MEDICATIONS the morning of your surgery. DO NOT TAKE ANY SUPPLEMENTS or over the counter medications the morning of  surgery. Rudy Cassidy for the NARES    A script for Bactroban ointment has been call to your pharmacy or was given to you in written form by your surgeon. The guidelines for the ointment use are as follows:    1)  Start using the ointment 7 days before your surgery date    2)  Use the ointment two times a day - morning and night    3)  Place the ointment on a Q-tip and swirl up in your nose making sure you cover completely       the skin just inside of each nostril. Use one end of the Q-tip for each nostril. Chlorhexidine Gluconate 4% Solution    Patient should shower with this soap a minimum of 3 consecutive showers (2 nights before surgery, the night before surgery and the morning of surgery) washing from the neck down (avoiding contact with genitalia). DO  [address] [facility] [address] YOUR HAIR OR FACE WITH THIS SOAP. When washing with this soap, apply enough to suds up the body thoroughly, turn the water away from your body and allow the soap suds to remain on the body for 2 full minutes, then rinse body completely. After using this soap on the body, please do not apply powders or lotions to your body.   After the shower

## 2023-08-09 ENCOUNTER — OFFICE VISIT (OUTPATIENT)
Dept: CARDIOLOGY CLINIC | Age: 78
End: 2023-08-09
Payer: COMMERCIAL

## 2023-08-09 VITALS
WEIGHT: 210 LBS | SYSTOLIC BLOOD PRESSURE: 110 MMHG | HEART RATE: 85 BPM | DIASTOLIC BLOOD PRESSURE: 72 MMHG | BODY MASS INDEX: 30.06 KG/M2 | HEIGHT: 70 IN | OXYGEN SATURATION: 98 %

## 2023-08-09 DIAGNOSIS — Z98.890 S/P MAZE OPERATION FOR ATRIAL FIBRILLATION: ICD-10-CM

## 2023-08-09 DIAGNOSIS — I44.2 CHB (COMPLETE HEART BLOCK) (HCC): ICD-10-CM

## 2023-08-09 DIAGNOSIS — I48.0 PAF (PAROXYSMAL ATRIAL FIBRILLATION) (HCC): ICD-10-CM

## 2023-08-09 DIAGNOSIS — I50.22 CHRONIC SYSTOLIC HEART FAILURE (HCC): ICD-10-CM

## 2023-08-09 DIAGNOSIS — I10 ESSENTIAL HYPERTENSION: ICD-10-CM

## 2023-08-09 DIAGNOSIS — Z86.79 S/P MAZE OPERATION FOR ATRIAL FIBRILLATION: ICD-10-CM

## 2023-08-09 DIAGNOSIS — Z01.810 PREOPERATIVE CARDIOVASCULAR EXAMINATION: Primary | ICD-10-CM

## 2023-08-09 DIAGNOSIS — Z98.890 HISTORY OF MITRAL VALVE REPAIR: ICD-10-CM

## 2023-08-09 DIAGNOSIS — I25.10 CORONARY ARTERY DISEASE INVOLVING NATIVE CORONARY ARTERY OF NATIVE HEART WITHOUT ANGINA PECTORIS: ICD-10-CM

## 2023-08-09 DIAGNOSIS — I38 VALVULAR HEART DISEASE: ICD-10-CM

## 2023-08-09 DIAGNOSIS — E78.2 MIXED HYPERLIPIDEMIA: ICD-10-CM

## 2023-08-09 DIAGNOSIS — Z95.0 PACEMAKER: ICD-10-CM

## 2023-08-09 PROCEDURE — 3074F SYST BP LT 130 MM HG: CPT | Performed by: CLINICAL NURSE SPECIALIST

## 2023-08-09 PROCEDURE — 3078F DIAST BP <80 MM HG: CPT | Performed by: CLINICAL NURSE SPECIALIST

## 2023-08-09 PROCEDURE — 1123F ACP DISCUSS/DSCN MKR DOCD: CPT | Performed by: CLINICAL NURSE SPECIALIST

## 2023-08-09 PROCEDURE — 93288 INTERROG EVL PM/LDLS PM IP: CPT | Performed by: CLINICAL NURSE SPECIALIST

## 2023-08-09 PROCEDURE — 99214 OFFICE O/P EST MOD 30 MIN: CPT | Performed by: CLINICAL NURSE SPECIALIST

## 2023-08-09 ASSESSMENT — ENCOUNTER SYMPTOMS
WHEEZING: 0
SHORTNESS OF BREATH: 0
COUGH: 0
ABDOMINAL PAIN: 0
EYE REDNESS: 0
VOMITING: 0
CHEST TIGHTNESS: 0
FACIAL SWELLING: 0
NAUSEA: 0

## 2023-08-09 NOTE — PATIENT INSTRUCTIONS
Return for Dr Lj Griffin, as scheduled.     Letter to Dr Amada Ward    Consider addition of Jardiance for heart failure therapy

## 2023-08-21 ENCOUNTER — ANESTHESIA (OUTPATIENT)
Dept: OPERATING ROOM | Age: 78
End: 2023-08-21
Payer: OTHER GOVERNMENT

## 2023-08-21 ENCOUNTER — ANESTHESIA EVENT (OUTPATIENT)
Dept: OPERATING ROOM | Age: 78
End: 2023-08-21
Payer: OTHER GOVERNMENT

## 2023-08-21 ENCOUNTER — APPOINTMENT (OUTPATIENT)
Dept: GENERAL RADIOLOGY | Age: 78
End: 2023-08-21
Attending: ORTHOPAEDIC SURGERY
Payer: OTHER GOVERNMENT

## 2023-08-21 ENCOUNTER — HOSPITAL ENCOUNTER (OUTPATIENT)
Age: 78
Setting detail: OBSERVATION
Discharge: HOME HEALTH | End: 2023-08-24
Attending: ORTHOPAEDIC SURGERY | Admitting: ORTHOPAEDIC SURGERY
Payer: OTHER GOVERNMENT

## 2023-08-21 DIAGNOSIS — M17.11 PRIMARY OSTEOARTHRITIS OF RIGHT KNEE: Primary | ICD-10-CM

## 2023-08-21 PROBLEM — K59.03 DRUG-INDUCED CONSTIPATION: Status: ACTIVE | Noted: 2023-08-21

## 2023-08-21 PROBLEM — M17.10 ARTHRITIS OF KNEE: Status: ACTIVE | Noted: 2023-08-21

## 2023-08-21 LAB
GLUCOSE BLD-MCNC: 147 MG/DL (ref 70–99)
GLUCOSE BLD-MCNC: 171 MG/DL (ref 70–99)
PERFORMED ON: ABNORMAL
PERFORMED ON: ABNORMAL

## 2023-08-21 PROCEDURE — A4217 STERILE WATER/SALINE, 500 ML: HCPCS | Performed by: ORTHOPAEDIC SURGERY

## 2023-08-21 PROCEDURE — 3600000005 HC SURGERY LEVEL 5 BASE: Performed by: ORTHOPAEDIC SURGERY

## 2023-08-21 PROCEDURE — 7100000000 HC PACU RECOVERY - FIRST 15 MIN: Performed by: ORTHOPAEDIC SURGERY

## 2023-08-21 PROCEDURE — C1776 JOINT DEVICE (IMPLANTABLE): HCPCS | Performed by: ORTHOPAEDIC SURGERY

## 2023-08-21 PROCEDURE — 3700000000 HC ANESTHESIA ATTENDED CARE: Performed by: ORTHOPAEDIC SURGERY

## 2023-08-21 PROCEDURE — 6370000000 HC RX 637 (ALT 250 FOR IP): Performed by: ORTHOPAEDIC SURGERY

## 2023-08-21 PROCEDURE — 2700000000 HC OXYGEN THERAPY PER DAY

## 2023-08-21 PROCEDURE — 7100000001 HC PACU RECOVERY - ADDTL 15 MIN: Performed by: ORTHOPAEDIC SURGERY

## 2023-08-21 PROCEDURE — 2580000003 HC RX 258: Performed by: ORTHOPAEDIC SURGERY

## 2023-08-21 PROCEDURE — 2709999900 HC NON-CHARGEABLE SUPPLY: Performed by: ORTHOPAEDIC SURGERY

## 2023-08-21 PROCEDURE — 3600000015 HC SURGERY LEVEL 5 ADDTL 15MIN: Performed by: ORTHOPAEDIC SURGERY

## 2023-08-21 PROCEDURE — 6370000000 HC RX 637 (ALT 250 FOR IP): Performed by: FAMILY MEDICINE

## 2023-08-21 PROCEDURE — G0378 HOSPITAL OBSERVATION PER HR: HCPCS

## 2023-08-21 PROCEDURE — 73560 X-RAY EXAM OF KNEE 1 OR 2: CPT

## 2023-08-21 PROCEDURE — 6360000002 HC RX W HCPCS: Performed by: ORTHOPAEDIC SURGERY

## 2023-08-21 PROCEDURE — 6370000000 HC RX 637 (ALT 250 FOR IP): Performed by: ANESTHESIOLOGY

## 2023-08-21 PROCEDURE — 2500000003 HC RX 250 WO HCPCS: Performed by: ANESTHESIOLOGY

## 2023-08-21 PROCEDURE — 6360000002 HC RX W HCPCS: Performed by: NURSE ANESTHETIST, CERTIFIED REGISTERED

## 2023-08-21 PROCEDURE — 64447 NJX AA&/STRD FEMORAL NRV IMG: CPT | Performed by: NURSE ANESTHETIST, CERTIFIED REGISTERED

## 2023-08-21 PROCEDURE — 6360000002 HC RX W HCPCS: Performed by: ANESTHESIOLOGY

## 2023-08-21 PROCEDURE — 2500000003 HC RX 250 WO HCPCS: Performed by: NURSE ANESTHETIST, CERTIFIED REGISTERED

## 2023-08-21 PROCEDURE — C9290 INJ, BUPIVACAINE LIPOSOME: HCPCS | Performed by: ORTHOPAEDIC SURGERY

## 2023-08-21 PROCEDURE — 94760 N-INVAS EAR/PLS OXIMETRY 1: CPT

## 2023-08-21 PROCEDURE — 94150 VITAL CAPACITY TEST: CPT

## 2023-08-21 PROCEDURE — 2720000010 HC SURG SUPPLY STERILE: Performed by: ORTHOPAEDIC SURGERY

## 2023-08-21 PROCEDURE — C9399 UNCLASSIFIED DRUGS OR BIOLOG: HCPCS | Performed by: NURSE ANESTHETIST, CERTIFIED REGISTERED

## 2023-08-21 PROCEDURE — 82962 GLUCOSE BLOOD TEST: CPT

## 2023-08-21 PROCEDURE — 3700000001 HC ADD 15 MINUTES (ANESTHESIA): Performed by: ORTHOPAEDIC SURGERY

## 2023-08-21 DEVICE — IMPLANTABLE DEVICE
Type: IMPLANTABLE DEVICE | Site: KNEE | Status: FUNCTIONAL
Brand: PERSONA® TRABECULAR METAL®

## 2023-08-21 DEVICE — PSN MC VE ASF R 10MM 8-11 EF: Type: IMPLANTABLE DEVICE | Site: KNEE | Status: FUNCTIONAL

## 2023-08-21 DEVICE — PSN TIB POR 2 PEG SZ F R: Type: IMPLANTABLE DEVICE | Site: KNEE | Status: FUNCTIONAL

## 2023-08-21 RX ORDER — DIPHENHYDRAMINE HCL 25 MG
25 TABLET ORAL EVERY 6 HOURS PRN
Status: DISCONTINUED | OUTPATIENT
Start: 2023-08-21 | End: 2023-08-24 | Stop reason: HOSPADM

## 2023-08-21 RX ORDER — CELECOXIB 200 MG/1
200 CAPSULE ORAL ONCE
Status: COMPLETED | OUTPATIENT
Start: 2023-08-21 | End: 2023-08-21

## 2023-08-21 RX ORDER — SODIUM CHLORIDE, SODIUM LACTATE, POTASSIUM CHLORIDE, CALCIUM CHLORIDE 600; 310; 30; 20 MG/100ML; MG/100ML; MG/100ML; MG/100ML
INJECTION, SOLUTION INTRAVENOUS CONTINUOUS
Status: DISCONTINUED | OUTPATIENT
Start: 2023-08-21 | End: 2023-08-21 | Stop reason: HOSPADM

## 2023-08-21 RX ORDER — ONDANSETRON 2 MG/ML
4 INJECTION INTRAMUSCULAR; INTRAVENOUS EVERY 6 HOURS PRN
Status: DISCONTINUED | OUTPATIENT
Start: 2023-08-21 | End: 2023-08-24 | Stop reason: HOSPADM

## 2023-08-21 RX ORDER — HYDROMORPHONE HYDROCHLORIDE 1 MG/ML
0.25 INJECTION, SOLUTION INTRAMUSCULAR; INTRAVENOUS; SUBCUTANEOUS
Status: DISCONTINUED | OUTPATIENT
Start: 2023-08-21 | End: 2023-08-24 | Stop reason: HOSPADM

## 2023-08-21 RX ORDER — SODIUM CHLORIDE 0.9 % (FLUSH) 0.9 %
5-40 SYRINGE (ML) INJECTION PRN
Status: DISCONTINUED | OUTPATIENT
Start: 2023-08-21 | End: 2023-08-21 | Stop reason: HOSPADM

## 2023-08-21 RX ORDER — LIDOCAINE HYDROCHLORIDE 10 MG/ML
INJECTION, SOLUTION INFILTRATION; PERINEURAL PRN
Status: DISCONTINUED | OUTPATIENT
Start: 2023-08-21 | End: 2023-08-21 | Stop reason: SDUPTHER

## 2023-08-21 RX ORDER — BISACODYL 5 MG/1
5 TABLET, DELAYED RELEASE ORAL DAILY
Status: DISCONTINUED | OUTPATIENT
Start: 2023-08-21 | End: 2023-08-24 | Stop reason: HOSPADM

## 2023-08-21 RX ORDER — 0.9 % SODIUM CHLORIDE 0.9 %
500 INTRAVENOUS SOLUTION INTRAVENOUS PRN
Status: DISCONTINUED | OUTPATIENT
Start: 2023-08-21 | End: 2023-08-24 | Stop reason: HOSPADM

## 2023-08-21 RX ORDER — POLYETHYLENE GLYCOL 3350 17 G/17G
17 POWDER, FOR SOLUTION ORAL 2 TIMES DAILY
Status: DISCONTINUED | OUTPATIENT
Start: 2023-08-21 | End: 2023-08-24 | Stop reason: HOSPADM

## 2023-08-21 RX ORDER — ROPIVACAINE HYDROCHLORIDE 5 MG/ML
30 INJECTION, SOLUTION EPIDURAL; INFILTRATION; PERINEURAL ONCE
Status: DISCONTINUED | OUTPATIENT
Start: 2023-08-21 | End: 2023-08-21 | Stop reason: HOSPADM

## 2023-08-21 RX ORDER — SODIUM CHLORIDE 9 MG/ML
INJECTION, SOLUTION INTRAVENOUS CONTINUOUS
Status: DISCONTINUED | OUTPATIENT
Start: 2023-08-21 | End: 2023-08-24 | Stop reason: HOSPADM

## 2023-08-21 RX ORDER — MIDAZOLAM HYDROCHLORIDE 2 MG/2ML
2 INJECTION, SOLUTION INTRAMUSCULAR; INTRAVENOUS
Status: COMPLETED | OUTPATIENT
Start: 2023-08-21 | End: 2023-08-21

## 2023-08-21 RX ORDER — OXYCODONE HYDROCHLORIDE 5 MG/1
15 TABLET ORAL EVERY 4 HOURS PRN
Status: DISCONTINUED | OUTPATIENT
Start: 2023-08-21 | End: 2023-08-24 | Stop reason: HOSPADM

## 2023-08-21 RX ORDER — BUPIVACAINE HYDROCHLORIDE 2.5 MG/ML
INJECTION, SOLUTION INFILTRATION; PERINEURAL PRN
Status: DISCONTINUED | OUTPATIENT
Start: 2023-08-21 | End: 2023-08-21 | Stop reason: ALTCHOICE

## 2023-08-21 RX ORDER — ONDANSETRON 2 MG/ML
INJECTION INTRAMUSCULAR; INTRAVENOUS PRN
Status: DISCONTINUED | OUTPATIENT
Start: 2023-08-21 | End: 2023-08-21 | Stop reason: SDUPTHER

## 2023-08-21 RX ORDER — PANTOPRAZOLE SODIUM 40 MG/1
40 TABLET, DELAYED RELEASE ORAL DAILY
Status: DISCONTINUED | OUTPATIENT
Start: 2023-08-21 | End: 2023-08-24 | Stop reason: HOSPADM

## 2023-08-21 RX ORDER — PANTOPRAZOLE SODIUM 40 MG/1
40 TABLET, DELAYED RELEASE ORAL
Status: DISCONTINUED | OUTPATIENT
Start: 2023-08-22 | End: 2023-08-21

## 2023-08-21 RX ORDER — ROCURONIUM BROMIDE 10 MG/ML
INJECTION, SOLUTION INTRAVENOUS PRN
Status: DISCONTINUED | OUTPATIENT
Start: 2023-08-21 | End: 2023-08-21 | Stop reason: SDUPTHER

## 2023-08-21 RX ORDER — LIDOCAINE HYDROCHLORIDE 10 MG/ML
INJECTION, SOLUTION INFILTRATION; PERINEURAL
Status: COMPLETED | OUTPATIENT
Start: 2023-08-21 | End: 2023-08-21

## 2023-08-21 RX ORDER — HYDROMORPHONE HYDROCHLORIDE 1 MG/ML
1 INJECTION, SOLUTION INTRAMUSCULAR; INTRAVENOUS; SUBCUTANEOUS
Status: DISCONTINUED | OUTPATIENT
Start: 2023-08-21 | End: 2023-08-24 | Stop reason: HOSPADM

## 2023-08-21 RX ORDER — SODIUM CHLORIDE 9 MG/ML
INJECTION, SOLUTION INTRAVENOUS PRN
Status: DISCONTINUED | OUTPATIENT
Start: 2023-08-21 | End: 2023-08-24 | Stop reason: HOSPADM

## 2023-08-21 RX ORDER — CARVEDILOL 12.5 MG/1
12.5 TABLET ORAL 2 TIMES DAILY
Status: DISCONTINUED | OUTPATIENT
Start: 2023-08-21 | End: 2023-08-24 | Stop reason: HOSPADM

## 2023-08-21 RX ORDER — CLOPIDOGREL BISULFATE 75 MG/1
75 TABLET ORAL DAILY
Status: DISCONTINUED | OUTPATIENT
Start: 2023-08-21 | End: 2023-08-24 | Stop reason: HOSPADM

## 2023-08-21 RX ORDER — DEXAMETHASONE SODIUM PHOSPHATE 10 MG/ML
INJECTION, SOLUTION INTRAMUSCULAR; INTRAVENOUS PRN
Status: DISCONTINUED | OUTPATIENT
Start: 2023-08-21 | End: 2023-08-21 | Stop reason: SDUPTHER

## 2023-08-21 RX ORDER — ROPIVACAINE HYDROCHLORIDE 5 MG/ML
INJECTION, SOLUTION EPIDURAL; INFILTRATION; PERINEURAL
Status: COMPLETED | OUTPATIENT
Start: 2023-08-21 | End: 2023-08-21

## 2023-08-21 RX ORDER — SODIUM CHLORIDE 0.9 % (FLUSH) 0.9 %
5-40 SYRINGE (ML) INJECTION PRN
Status: DISCONTINUED | OUTPATIENT
Start: 2023-08-21 | End: 2023-08-24 | Stop reason: HOSPADM

## 2023-08-21 RX ORDER — DEXAMETHASONE SODIUM PHOSPHATE 10 MG/ML
10 INJECTION, SOLUTION INTRAMUSCULAR; INTRAVENOUS ONCE
Status: DISCONTINUED | OUTPATIENT
Start: 2023-08-21 | End: 2023-08-21 | Stop reason: HOSPADM

## 2023-08-21 RX ORDER — HYDROMORPHONE HYDROCHLORIDE 1 MG/ML
0.5 INJECTION, SOLUTION INTRAMUSCULAR; INTRAVENOUS; SUBCUTANEOUS EVERY 5 MIN PRN
Status: DISCONTINUED | OUTPATIENT
Start: 2023-08-21 | End: 2023-08-21 | Stop reason: HOSPADM

## 2023-08-21 RX ORDER — SODIUM CHLORIDE 0.9 % (FLUSH) 0.9 %
5-40 SYRINGE (ML) INJECTION EVERY 12 HOURS SCHEDULED
Status: DISCONTINUED | OUTPATIENT
Start: 2023-08-21 | End: 2023-08-24 | Stop reason: HOSPADM

## 2023-08-21 RX ORDER — PREGABALIN 75 MG/1
75 CAPSULE ORAL ONCE
Status: COMPLETED | OUTPATIENT
Start: 2023-08-21 | End: 2023-08-21

## 2023-08-21 RX ORDER — CARVEDILOL 12.5 MG/1
12.5 TABLET ORAL ONCE
Status: COMPLETED | OUTPATIENT
Start: 2023-08-21 | End: 2023-08-21

## 2023-08-21 RX ORDER — ONDANSETRON 4 MG/1
4 TABLET, ORALLY DISINTEGRATING ORAL EVERY 8 HOURS PRN
Status: DISCONTINUED | OUTPATIENT
Start: 2023-08-21 | End: 2023-08-24 | Stop reason: HOSPADM

## 2023-08-21 RX ORDER — SODIUM CHLORIDE 9 MG/ML
INJECTION, SOLUTION INTRAVENOUS PRN
Status: DISCONTINUED | OUTPATIENT
Start: 2023-08-21 | End: 2023-08-21 | Stop reason: HOSPADM

## 2023-08-21 RX ORDER — OXYCODONE HYDROCHLORIDE 5 MG/1
5 TABLET ORAL EVERY 4 HOURS PRN
Status: DISCONTINUED | OUTPATIENT
Start: 2023-08-21 | End: 2023-08-24 | Stop reason: HOSPADM

## 2023-08-21 RX ORDER — HYDROMORPHONE HYDROCHLORIDE 1 MG/ML
0.25 INJECTION, SOLUTION INTRAMUSCULAR; INTRAVENOUS; SUBCUTANEOUS EVERY 5 MIN PRN
Status: COMPLETED | OUTPATIENT
Start: 2023-08-21 | End: 2023-08-21

## 2023-08-21 RX ORDER — TRAMADOL HYDROCHLORIDE 50 MG/1
50 TABLET ORAL EVERY 6 HOURS
Status: DISCONTINUED | OUTPATIENT
Start: 2023-08-21 | End: 2023-08-24 | Stop reason: HOSPADM

## 2023-08-21 RX ORDER — DIPHENHYDRAMINE HYDROCHLORIDE 50 MG/ML
12.5 INJECTION INTRAMUSCULAR; INTRAVENOUS
Status: DISCONTINUED | OUTPATIENT
Start: 2023-08-21 | End: 2023-08-21 | Stop reason: HOSPADM

## 2023-08-21 RX ORDER — TRANEXAMIC ACID 100 MG/ML
INJECTION, SOLUTION INTRAVENOUS PRN
Status: DISCONTINUED | OUTPATIENT
Start: 2023-08-21 | End: 2023-08-21 | Stop reason: SDUPTHER

## 2023-08-21 RX ORDER — ACETAMINOPHEN 325 MG/1
650 TABLET ORAL EVERY 6 HOURS
Status: DISCONTINUED | OUTPATIENT
Start: 2023-08-21 | End: 2023-08-24 | Stop reason: HOSPADM

## 2023-08-21 RX ORDER — ATORVASTATIN CALCIUM 20 MG/1
20 TABLET, FILM COATED ORAL DAILY
Status: DISCONTINUED | OUTPATIENT
Start: 2023-08-21 | End: 2023-08-24 | Stop reason: HOSPADM

## 2023-08-21 RX ORDER — PROPOFOL 10 MG/ML
INJECTION, EMULSION INTRAVENOUS PRN
Status: DISCONTINUED | OUTPATIENT
Start: 2023-08-21 | End: 2023-08-21 | Stop reason: SDUPTHER

## 2023-08-21 RX ORDER — FENTANYL CITRATE 50 UG/ML
INJECTION, SOLUTION INTRAMUSCULAR; INTRAVENOUS PRN
Status: DISCONTINUED | OUTPATIENT
Start: 2023-08-21 | End: 2023-08-21 | Stop reason: SDUPTHER

## 2023-08-21 RX ORDER — ACETAMINOPHEN 500 MG
1000 TABLET ORAL ONCE
Status: COMPLETED | OUTPATIENT
Start: 2023-08-21 | End: 2023-08-21

## 2023-08-21 RX ORDER — ASPIRIN 81 MG/1
81 TABLET ORAL 2 TIMES DAILY
Status: DISCONTINUED | OUTPATIENT
Start: 2023-08-21 | End: 2023-08-24 | Stop reason: HOSPADM

## 2023-08-21 RX ORDER — OXYCODONE HCL 10 MG/1
10 TABLET, FILM COATED, EXTENDED RELEASE ORAL ONCE
Status: COMPLETED | OUTPATIENT
Start: 2023-08-21 | End: 2023-08-21

## 2023-08-21 RX ORDER — OXYCODONE HYDROCHLORIDE 5 MG/1
10 TABLET ORAL EVERY 4 HOURS PRN
Status: DISCONTINUED | OUTPATIENT
Start: 2023-08-21 | End: 2023-08-24 | Stop reason: HOSPADM

## 2023-08-21 RX ORDER — HYDROMORPHONE HYDROCHLORIDE 1 MG/ML
0.5 INJECTION, SOLUTION INTRAMUSCULAR; INTRAVENOUS; SUBCUTANEOUS
Status: DISCONTINUED | OUTPATIENT
Start: 2023-08-21 | End: 2023-08-24 | Stop reason: HOSPADM

## 2023-08-21 RX ORDER — SODIUM CHLORIDE 0.9 % (FLUSH) 0.9 %
5-40 SYRINGE (ML) INJECTION EVERY 12 HOURS SCHEDULED
Status: DISCONTINUED | OUTPATIENT
Start: 2023-08-21 | End: 2023-08-21 | Stop reason: HOSPADM

## 2023-08-21 RX ORDER — METOCLOPRAMIDE HYDROCHLORIDE 5 MG/ML
10 INJECTION INTRAMUSCULAR; INTRAVENOUS
Status: DISCONTINUED | OUTPATIENT
Start: 2023-08-21 | End: 2023-08-21 | Stop reason: HOSPADM

## 2023-08-21 RX ADMIN — SODIUM CHLORIDE, SODIUM LACTATE, POTASSIUM CHLORIDE, AND CALCIUM CHLORIDE: 600; 310; 30; 20 INJECTION, SOLUTION INTRAVENOUS at 08:54

## 2023-08-21 RX ADMIN — WATER 2000 MG: 1 INJECTION INTRAMUSCULAR; INTRAVENOUS; SUBCUTANEOUS at 20:44

## 2023-08-21 RX ADMIN — FENTANYL CITRATE 50 MCG: 0.05 INJECTION, SOLUTION INTRAMUSCULAR; INTRAVENOUS at 12:13

## 2023-08-21 RX ADMIN — SUGAMMADEX 200 MG: 100 INJECTION, SOLUTION INTRAVENOUS at 13:05

## 2023-08-21 RX ADMIN — LIDOCAINE HYDROCHLORIDE 1 ML: 10 INJECTION, SOLUTION INFILTRATION; PERINEURAL at 10:37

## 2023-08-21 RX ADMIN — MIDAZOLAM 2 MG: 1 INJECTION INTRAMUSCULAR; INTRAVENOUS at 10:31

## 2023-08-21 RX ADMIN — TRAMADOL HYDROCHLORIDE 50 MG: 50 TABLET, COATED ORAL at 15:54

## 2023-08-21 RX ADMIN — FENTANYL CITRATE 50 MCG: 0.05 INJECTION, SOLUTION INTRAMUSCULAR; INTRAVENOUS at 12:26

## 2023-08-21 RX ADMIN — PROPOFOL 150 MG: 10 INJECTION, EMULSION INTRAVENOUS at 11:33

## 2023-08-21 RX ADMIN — CARVEDILOL 12.5 MG: 12.5 TABLET, FILM COATED ORAL at 10:06

## 2023-08-21 RX ADMIN — CELECOXIB 200 MG: 200 CAPSULE ORAL at 09:40

## 2023-08-21 RX ADMIN — OXYCODONE HYDROCHLORIDE 10 MG: 10 TABLET, FILM COATED, EXTENDED RELEASE ORAL at 09:40

## 2023-08-21 RX ADMIN — SODIUM CHLORIDE: 9 INJECTION, SOLUTION INTRAVENOUS at 20:04

## 2023-08-21 RX ADMIN — METFORMIN HYDROCHLORIDE 1000 MG: 500 TABLET, FILM COATED ORAL at 15:54

## 2023-08-21 RX ADMIN — HYDROMORPHONE HYDROCHLORIDE 0.25 MG: 1 INJECTION, SOLUTION INTRAMUSCULAR; INTRAVENOUS; SUBCUTANEOUS at 14:05

## 2023-08-21 RX ADMIN — ASPIRIN 81 MG: 81 TABLET, COATED ORAL at 20:44

## 2023-08-21 RX ADMIN — HYDROMORPHONE HYDROCHLORIDE 0.25 MG: 1 INJECTION, SOLUTION INTRAMUSCULAR; INTRAVENOUS; SUBCUTANEOUS at 14:00

## 2023-08-21 RX ADMIN — BISACODYL 5 MG: 5 TABLET, COATED ORAL at 15:56

## 2023-08-21 RX ADMIN — CARVEDILOL 12.5 MG: 12.5 TABLET, FILM COATED ORAL at 20:44

## 2023-08-21 RX ADMIN — CEFAZOLIN 2000 MG: 2 INJECTION, POWDER, FOR SOLUTION INTRAMUSCULAR; INTRAVENOUS at 11:44

## 2023-08-21 RX ADMIN — TRAMADOL HYDROCHLORIDE 50 MG: 50 TABLET, COATED ORAL at 20:44

## 2023-08-21 RX ADMIN — TRANEXAMIC ACID 1000 MG: 1 INJECTION, SOLUTION INTRAVENOUS at 12:54

## 2023-08-21 RX ADMIN — CLOPIDOGREL BISULFATE 75 MG: 75 TABLET ORAL at 15:54

## 2023-08-21 RX ADMIN — FENTANYL CITRATE 50 MCG: 0.05 INJECTION, SOLUTION INTRAMUSCULAR; INTRAVENOUS at 11:33

## 2023-08-21 RX ADMIN — POLYETHYLENE GLYCOL 3350 17 G: 17 POWDER, FOR SOLUTION ORAL at 20:44

## 2023-08-21 RX ADMIN — DEXAMETHASONE SODIUM PHOSPHATE 10 MG: 10 INJECTION, SOLUTION INTRAMUSCULAR; INTRAVENOUS at 11:47

## 2023-08-21 RX ADMIN — OXYCODONE HYDROCHLORIDE 10 MG: 5 TABLET ORAL at 17:59

## 2023-08-21 RX ADMIN — HYDROMORPHONE HYDROCHLORIDE 0.25 MG: 1 INJECTION, SOLUTION INTRAMUSCULAR; INTRAVENOUS; SUBCUTANEOUS at 14:10

## 2023-08-21 RX ADMIN — ATORVASTATIN CALCIUM 20 MG: 20 TABLET, FILM COATED ORAL at 16:01

## 2023-08-21 RX ADMIN — LIDOCAINE HYDROCHLORIDE 50 MG: 10 INJECTION, SOLUTION INFILTRATION; PERINEURAL at 11:33

## 2023-08-21 RX ADMIN — ACETAMINOPHEN 650 MG: 325 TABLET ORAL at 20:44

## 2023-08-21 RX ADMIN — FENTANYL CITRATE 50 MCG: 0.05 INJECTION, SOLUTION INTRAMUSCULAR; INTRAVENOUS at 12:30

## 2023-08-21 RX ADMIN — PANTOPRAZOLE SODIUM 40 MG: 40 TABLET, DELAYED RELEASE ORAL at 18:15

## 2023-08-21 RX ADMIN — HYDROMORPHONE HYDROCHLORIDE 0.25 MG: 1 INJECTION, SOLUTION INTRAMUSCULAR; INTRAVENOUS; SUBCUTANEOUS at 14:15

## 2023-08-21 RX ADMIN — PREGABALIN 75 MG: 75 CAPSULE ORAL at 09:40

## 2023-08-21 RX ADMIN — SODIUM CHLORIDE: 9 INJECTION, SOLUTION INTRAVENOUS at 15:59

## 2023-08-21 RX ADMIN — ROCURONIUM BROMIDE 50 MG: 10 INJECTION, SOLUTION INTRAVENOUS at 11:33

## 2023-08-21 RX ADMIN — FENTANYL CITRATE 50 MCG: 0.05 INJECTION, SOLUTION INTRAMUSCULAR; INTRAVENOUS at 12:16

## 2023-08-21 RX ADMIN — ACETAMINOPHEN 1000 MG: 500 TABLET ORAL at 09:40

## 2023-08-21 RX ADMIN — TRANEXAMIC ACID 1000 MG: 1 INJECTION, SOLUTION INTRAVENOUS at 11:39

## 2023-08-21 RX ADMIN — ROPIVACAINE HYDROCHLORIDE 20 ML: 5 INJECTION, SOLUTION EPIDURAL; INFILTRATION; PERINEURAL at 10:37

## 2023-08-21 RX ADMIN — ACETAMINOPHEN 650 MG: 325 TABLET ORAL at 15:55

## 2023-08-21 RX ADMIN — ONDANSETRON 4 MG: 2 INJECTION INTRAMUSCULAR; INTRAVENOUS at 13:00

## 2023-08-21 RX ADMIN — SODIUM CHLORIDE, SODIUM LACTATE, POTASSIUM CHLORIDE, AND CALCIUM CHLORIDE: 600; 310; 30; 20 INJECTION, SOLUTION INTRAVENOUS at 11:54

## 2023-08-21 SDOH — ECONOMIC STABILITY: INCOME INSECURITY: HOW HARD IS IT FOR YOU TO PAY FOR THE VERY BASICS LIKE FOOD, HOUSING, MEDICAL CARE, AND HEATING?: NOT HARD AT ALL

## 2023-08-21 SDOH — HEALTH STABILITY: PHYSICAL HEALTH: ON AVERAGE, HOW MANY MINUTES DO YOU ENGAGE IN EXERCISE AT THIS LEVEL?: 60 MIN

## 2023-08-21 SDOH — HEALTH STABILITY: PHYSICAL HEALTH: ON AVERAGE, HOW MANY DAYS PER WEEK DO YOU ENGAGE IN MODERATE TO STRENUOUS EXERCISE (LIKE A BRISK WALK)?: 7 DAYS

## 2023-08-21 SDOH — ECONOMIC STABILITY: INCOME INSECURITY: IN THE LAST 12 MONTHS, WAS THERE A TIME WHEN YOU WERE NOT ABLE TO PAY THE MORTGAGE OR RENT ON TIME?: NO

## 2023-08-21 ASSESSMENT — PAIN SCALES - GENERAL
PAINLEVEL_OUTOF10: 4
PAINLEVEL_OUTOF10: 6
PAINLEVEL_OUTOF10: 4

## 2023-08-21 ASSESSMENT — PAIN DESCRIPTION - ORIENTATION
ORIENTATION: RIGHT

## 2023-08-21 ASSESSMENT — PAIN DESCRIPTION - DESCRIPTORS
DESCRIPTORS: ACHING

## 2023-08-21 ASSESSMENT — PAIN - FUNCTIONAL ASSESSMENT
PAIN_FUNCTIONAL_ASSESSMENT: 0-10
PAIN_FUNCTIONAL_ASSESSMENT: PREVENTS OR INTERFERES SOME ACTIVE ACTIVITIES AND ADLS

## 2023-08-21 ASSESSMENT — PAIN DESCRIPTION - LOCATION
LOCATION: KNEE

## 2023-08-21 ASSESSMENT — LIFESTYLE VARIABLES: SMOKING_STATUS: 0

## 2023-08-21 NOTE — DISCHARGE INSTRUCTIONS
Dr Viridiana Rueda Total Knee Replacement Discharge Instructions     *Elevate legs at all times when not walking  * Use Ice Pack to affected extremity as needed for swelling and pain     * HOMEWORK          Be able to fully straighten leg by post-op appointment. This is the most important thing to work on!!!          Use the ankle pillow or a towel roll under the ankle to work on straightening          You may work on bending the knee also    *Surgical Site Care: The adhesive (glue strip) dressing can be removed in 3 weeks. May shower on Wednesday and clean wound but do not scrub incision. Pat dry. NO tub bathing or swimming. Wash hands frequently during the day. *Activity:      [facility] ! [address] [facility] !!!                 Home Health therapy will come to the house three times a week:                    Get in and out of your bed                                                                                           Getting up and down out of the chair                                                                   Bathroom  / bathing activities                                                                                Do NOT walk for exercise ( it will increase pain and swelling )                            Walk several times a day but only for short distances             *Pain Medicines:          Keep a LOG of your medicines to track when pain med is due          Take prescribed medicine as needed for pain          Take Tylenol as your pain decreases          Take a stool softener of your choice while taking pain medications as they are very constipating ( examples:  colace  dulcolax  fiber   prune juice )      *To prevent blood clots: Take prescribed blood thinner as directed.   (Plavix daily  and Aspirin 81 mg two times daily for 6 weeks)           While taking the blood thinner and Aspirin, DO NOT take any other NSAIDS like Naprosyn, Ibuprofen, etc. Do ankle pump exercises when sitting in the chair           *To Prevent Pneumonia:           Sit up and take deep breaths several times a day and use the incentive spirometer     *Call the office if:           Increased redness, any drainage, severe pain, new onset fever or chills. Also notify of any calf pain, tenderness, swelling or redness. Any new rash, nausea or vomiting     **If you have any questions or problems please call our office at 1 [phone]**   or contact [facility]) at 1 [phone].   Thank you

## 2023-08-21 NOTE — BRIEF OP NOTE
Brief Postoperative Note      Patient: Amauri Upton  YOB: 1945  MRN: [id number]    Date of Procedure: 8/21/2023    Pre-Op Diagnosis Codes:     * Primary osteoarthritis of right knee [M17.11]    Post-Op Diagnosis: Same       Procedure(s):  ROBOTIC RIGHT KNEE TOTAL ARTHROPLASTY    Surgeon(s):  Alva Tai MD    Assistant:  First Assistant: Cholo Leon; Iliana Thomas PA-C    Anesthesia: General    Estimated Blood Loss (mL): less than 144     Complications: None    Specimens:   * No specimens in log *    Implants:  Implant Name Type Inv.  Item Serial No.  Lot No. LRB No. Used Action   PSN TIB POR 2 PEG SZ F R - [id number]  PSN TIB POR 2 PEG SZ F R  [employer] [id number] Right 1 Implanted   PSN FEM CR POR CCR NRW SZ9 R - [id number]  PSN FEM CR POR CCR NRW [facility] [employer] [id number] Right 1 Implanted   PSN FEM CR POR CCR NRW SZ9 R - [id number]  PSN FEM CR POR CCR [facility] [employer] [id number] Right 1 Implanted         Drains: * No LDAs found *    Findings: TT:27 minutes      Electronically signed by Alva Tai MD on 8/21/2023 at 1:09 PM

## 2023-08-21 NOTE — ANESTHESIA POSTPROCEDURE EVALUATION
Department of Anesthesiology  Postprocedure Note    Patient: Maude León  MRN: [id number]  YOB: 1945  Date of evaluation: 8/21/2023      Procedure Summary     Date: 08/21/23 Room / Location: [address]    Anesthesia Start: 1130 Anesthesia Stop: 4764    Procedure: ROBOTIC RIGHT KNEE TOTAL ARTHROPLASTY (Right) Diagnosis:       Primary osteoarthritis of right knee      (Primary osteoarthritis of right knee [M17.11])    Surgeons: Leela Lu MD Responsible Provider: FLACO Philip CRNA    Anesthesia Type: regional, general ASA Status: 3          Anesthesia Type: No value filed.     Marianne Phase I: Marianne Score: 10    Marianne Phase II:        Anesthesia Post Evaluation    Patient location during evaluation: PACU  Patient participation: complete - patient cannot participate  Level of consciousness: responsive to physical stimuli  Pain score: 0  Airway patency: patent  Nausea & Vomiting: no nausea and no vomiting  Complications: no  Cardiovascular status: hemodynamically stable  Respiratory status: acceptable, spontaneous ventilation and room air  Hydration status: euvolemic  Pain management: adequate

## 2023-08-21 NOTE — PROGRESS NOTES
4 Eyes Skin Assessment     NAME:  Emily Martinez  YOB: 1945  MEDICAL RECORD NUMBER:  [id number]    The patient is being assessed for  Admission    I agree that at least one RN has performed a thorough Head to Toe Skin Assessment on the patient. ALL assessment sites listed below have been assessed. Areas assessed by both nurses:    Head, Face, Ears, Shoulders, Back, Chest, Arms, Elbows, Hands, Sacrum. Buttock, Coccyx, Ischium, and Legs. Feet and Heels        Does the Patient have a Wound?  No noted wound(s)       Warren Prevention initiated by RN: No  Wound Care Orders initiated by RN: No    Pressure Injury (Stage 3,4, Unstageable, DTI, NWPT, and Complex wounds) if present, place Wound referral order by RN under : No    New Ostomies, if present place, Ostomy referral order under : No     Nurse 1 eSignature: Electronically signed by Romel Jefferson RN on 8/21/23 at 3:34 PM CDT    **SHARE this note so that the co-signing nurse can place an eSignature**    Nurse 2 eSignature: Electronically signed by Deep Tolbert RN on 8/21/23 at 4:46 PM CDT

## 2023-08-21 NOTE — DISCHARGE INSTR - DIET
Good nutrition is important when healing from an illness, injury, or surgery. Follow any nutrition recommendations given to you during your hospital stay. If you were given an oral nutrition supplement while in the hospital, continue to take this supplement at home. You can take it with meals, in-between meals, and/or before bedtime. These supplements can be purchased at most local grocery stores, pharmacies, and chain [employer]stores. If you have any questions about your diet or nutrition, call the hospital and ask for the dietitian.             Resume home diet

## 2023-08-21 NOTE — ANESTHESIA PROCEDURE NOTES
Peripheral Block    Patient location during procedure: holding area  Reason for block: post-op pain management  Start time: 8/21/2023 10:37 AM  Staffing  Performed: anesthesiologist   Anesthesiologist: Dang Mccormack MD  Preanesthetic Checklist  Completed: patient identified, IV checked, site marked, risks and benefits discussed, surgical/procedural consents, equipment checked, pre-op evaluation, timeout performed, anesthesia consent given, oxygen available and monitors applied/VS acknowledged  Peripheral Block   Patient position: supine  Prep: ChloraPrep  Provider prep: mask and sterile gloves  Patient monitoring: cardiac monitor, continuous pulse ox, frequent blood pressure checks and IV access  Block type: Femoral  Adductor canal  Laterality: right  Injection technique: single-shot  Guidance: ultrasound guided  Local infiltration: ropivacaine  Infiltration strength: 0.5 %  Local infiltration: ropivacaine  Dose: 20 mL    Needle   Needle type: combined needle/nerve stimulator   Needle gauge: 22 G  Needle localization: ultrasound guidance  Needle length: 10 cm  Assessment   Injection assessment: negative aspiration for heme, no paresthesia on injection and local visualized surrounding nerve on ultrasound  Paresthesia pain: none  Slow fractionated injection: yes  Hemodynamics: stable  Real-time US image taken/store: yes  Outcomes: patient tolerated procedure well and uncomplicated    Additional Notes  Under ultrasound (\"US\") guidance, a 22 gauge needle was inserted and placed in close proximity to the femoral nerve. Ultrasound was also used to visualize the spread of the anesthetic in close proximity to the nerve being blocked. The nerve appeared anatomically normal, and there were no abnormal pathological findings. A permanent image was recorded.      20 mL 0.5% Ropivacaine injected  Medications Administered  lidocaine injection 1% - Subcuticular   1 mL - 8/21/2023 10:37:00 AM  ropivacaine (NAROPIN) injection 0.5%

## 2023-08-22 PROBLEM — M17.11 PRIMARY OSTEOARTHRITIS OF RIGHT KNEE: Status: ACTIVE | Noted: 2023-08-22

## 2023-08-22 LAB
ANION GAP SERPL CALCULATED.3IONS-SCNC: 13 MMOL/L (ref 7–19)
BUN SERPL-MCNC: 18 MG/DL (ref 8–23)
CALCIUM SERPL-MCNC: 8.4 MG/DL (ref 8.8–10.2)
CHLORIDE SERPL-SCNC: 106 MMOL/L (ref 98–111)
CO2 SERPL-SCNC: 18 MMOL/L (ref 22–29)
CREAT SERPL-MCNC: 1 MG/DL (ref 0.5–1.2)
FOLATE SERPL-MCNC: 11.6 NG/ML (ref 4.5–32.2)
GLUCOSE BLD-MCNC: 159 MG/DL (ref 70–99)
GLUCOSE BLD-MCNC: 194 MG/DL (ref 70–99)
GLUCOSE SERPL-MCNC: 199 MG/DL (ref 74–109)
HCT VFR BLD AUTO: 31.8 % (ref 42–52)
HGB BLD-MCNC: 10.8 G/DL (ref 14–18)
IRON SATN MFR SERPL: 14 % (ref 14–50)
IRON SERPL-MCNC: 42 UG/DL (ref 59–158)
PERFORMED ON: ABNORMAL
PERFORMED ON: ABNORMAL
POTASSIUM SERPL-SCNC: 5 MMOL/L (ref 3.5–5)
SODIUM SERPL-SCNC: 137 MMOL/L (ref 136–145)
TIBC SERPL-MCNC: 293 UG/DL (ref 250–400)
VIT B12 SERPL-MCNC: 452 PG/ML (ref 211–946)

## 2023-08-22 PROCEDURE — 97116 GAIT TRAINING THERAPY: CPT

## 2023-08-22 PROCEDURE — 85018 HEMOGLOBIN: CPT

## 2023-08-22 PROCEDURE — 80048 BASIC METABOLIC PNL TOTAL CA: CPT

## 2023-08-22 PROCEDURE — 6360000002 HC RX W HCPCS: Performed by: ORTHOPAEDIC SURGERY

## 2023-08-22 PROCEDURE — 6370000000 HC RX 637 (ALT 250 FOR IP): Performed by: FAMILY MEDICINE

## 2023-08-22 PROCEDURE — 85014 HEMATOCRIT: CPT

## 2023-08-22 PROCEDURE — 6370000000 HC RX 637 (ALT 250 FOR IP): Performed by: PHYSICIAN ASSISTANT

## 2023-08-22 PROCEDURE — 2580000003 HC RX 258: Performed by: ORTHOPAEDIC SURGERY

## 2023-08-22 PROCEDURE — 97162 PT EVAL MOD COMPLEX 30 MIN: CPT

## 2023-08-22 PROCEDURE — 83550 IRON BINDING TEST: CPT

## 2023-08-22 PROCEDURE — 83540 ASSAY OF IRON: CPT

## 2023-08-22 PROCEDURE — 96374 THER/PROPH/DIAG INJ IV PUSH: CPT

## 2023-08-22 PROCEDURE — 82962 GLUCOSE BLOOD TEST: CPT

## 2023-08-22 PROCEDURE — 36415 COLL VENOUS BLD VENIPUNCTURE: CPT

## 2023-08-22 PROCEDURE — 82746 ASSAY OF FOLIC ACID SERUM: CPT

## 2023-08-22 PROCEDURE — 2700000000 HC OXYGEN THERAPY PER DAY

## 2023-08-22 PROCEDURE — 82607 VITAMIN B-12: CPT

## 2023-08-22 PROCEDURE — G0378 HOSPITAL OBSERVATION PER HR: HCPCS

## 2023-08-22 PROCEDURE — 94760 N-INVAS EAR/PLS OXIMETRY 1: CPT

## 2023-08-22 PROCEDURE — 6370000000 HC RX 637 (ALT 250 FOR IP): Performed by: ORTHOPAEDIC SURGERY

## 2023-08-22 PROCEDURE — 96361 HYDRATE IV INFUSION ADD-ON: CPT

## 2023-08-22 RX ORDER — INSULIN LISPRO 100 [IU]/ML
0-8 INJECTION, SOLUTION INTRAVENOUS; SUBCUTANEOUS
Status: DISCONTINUED | OUTPATIENT
Start: 2023-08-22 | End: 2023-08-24 | Stop reason: HOSPADM

## 2023-08-22 RX ORDER — CHLORPROMAZINE HYDROCHLORIDE 25 MG/1
25 TABLET, FILM COATED ORAL 3 TIMES DAILY PRN
Status: DISCONTINUED | OUTPATIENT
Start: 2023-08-22 | End: 2023-08-24 | Stop reason: HOSPADM

## 2023-08-22 RX ORDER — INSULIN LISPRO 100 [IU]/ML
0-4 INJECTION, SOLUTION INTRAVENOUS; SUBCUTANEOUS NIGHTLY
Status: DISCONTINUED | OUTPATIENT
Start: 2023-08-22 | End: 2023-08-24 | Stop reason: HOSPADM

## 2023-08-22 RX ORDER — FERROUS SULFATE 325(65) MG
325 TABLET ORAL
Status: DISCONTINUED | OUTPATIENT
Start: 2023-08-22 | End: 2023-08-24 | Stop reason: HOSPADM

## 2023-08-22 RX ADMIN — WATER 2000 MG: 1 INJECTION INTRAMUSCULAR; INTRAVENOUS; SUBCUTANEOUS at 20:05

## 2023-08-22 RX ADMIN — ACETAMINOPHEN 650 MG: 325 TABLET ORAL at 16:56

## 2023-08-22 RX ADMIN — OXYCODONE HYDROCHLORIDE 10 MG: 5 TABLET ORAL at 07:19

## 2023-08-22 RX ADMIN — ASPIRIN 81 MG: 81 TABLET, COATED ORAL at 20:05

## 2023-08-22 RX ADMIN — TRAMADOL HYDROCHLORIDE 50 MG: 50 TABLET, COATED ORAL at 20:05

## 2023-08-22 RX ADMIN — ONDANSETRON 4 MG: 4 TABLET, ORALLY DISINTEGRATING ORAL at 11:36

## 2023-08-22 RX ADMIN — WATER 2000 MG: 1 INJECTION INTRAMUSCULAR; INTRAVENOUS; SUBCUTANEOUS at 12:12

## 2023-08-22 RX ADMIN — OXYCODONE HYDROCHLORIDE 15 MG: 5 TABLET ORAL at 11:34

## 2023-08-22 RX ADMIN — SODIUM CHLORIDE: 9 INJECTION, SOLUTION INTRAVENOUS at 02:51

## 2023-08-22 RX ADMIN — OXYCODONE HYDROCHLORIDE 15 MG: 5 TABLET ORAL at 21:58

## 2023-08-22 RX ADMIN — SODIUM CHLORIDE, PRESERVATIVE FREE 10 ML: 5 INJECTION INTRAVENOUS at 07:11

## 2023-08-22 RX ADMIN — OXYCODONE HYDROCHLORIDE 15 MG: 5 TABLET ORAL at 16:56

## 2023-08-22 RX ADMIN — HYDROMORPHONE HYDROCHLORIDE 0.5 MG: 1 INJECTION, SOLUTION INTRAMUSCULAR; INTRAVENOUS; SUBCUTANEOUS at 12:18

## 2023-08-22 RX ADMIN — SODIUM CHLORIDE, PRESERVATIVE FREE 10 ML: 5 INJECTION INTRAVENOUS at 20:05

## 2023-08-22 RX ADMIN — WATER 2000 MG: 1 INJECTION INTRAMUSCULAR; INTRAVENOUS; SUBCUTANEOUS at 02:51

## 2023-08-22 RX ADMIN — ACETAMINOPHEN 650 MG: 325 TABLET ORAL at 02:51

## 2023-08-22 RX ADMIN — TRAMADOL HYDROCHLORIDE 50 MG: 50 TABLET, COATED ORAL at 02:51

## 2023-08-22 RX ADMIN — CLOPIDOGREL BISULFATE 75 MG: 75 TABLET ORAL at 07:11

## 2023-08-22 RX ADMIN — PANTOPRAZOLE SODIUM 40 MG: 40 TABLET, DELAYED RELEASE ORAL at 07:10

## 2023-08-22 RX ADMIN — CARVEDILOL 12.5 MG: 12.5 TABLET, FILM COATED ORAL at 20:05

## 2023-08-22 RX ADMIN — BISACODYL 5 MG: 5 TABLET, COATED ORAL at 07:10

## 2023-08-22 RX ADMIN — CARVEDILOL 12.5 MG: 12.5 TABLET, FILM COATED ORAL at 07:11

## 2023-08-22 RX ADMIN — TRAMADOL HYDROCHLORIDE 50 MG: 50 TABLET, COATED ORAL at 16:56

## 2023-08-22 RX ADMIN — ATORVASTATIN CALCIUM 20 MG: 20 TABLET, FILM COATED ORAL at 07:09

## 2023-08-22 RX ADMIN — POLYETHYLENE GLYCOL 3350 17 G: 17 POWDER, FOR SOLUTION ORAL at 21:37

## 2023-08-22 RX ADMIN — TRAMADOL HYDROCHLORIDE 50 MG: 50 TABLET, COATED ORAL at 07:10

## 2023-08-22 RX ADMIN — FERROUS SULFATE TAB 325 MG (65 MG ELEMENTAL FE) 325 MG: 325 (65 FE) TAB at 07:25

## 2023-08-22 RX ADMIN — CHLORPROMAZINE HYDROCHLORIDE 25 MG: 25 TABLET, FILM COATED ORAL at 18:52

## 2023-08-22 RX ADMIN — ACETAMINOPHEN 650 MG: 325 TABLET ORAL at 20:05

## 2023-08-22 RX ADMIN — METFORMIN HYDROCHLORIDE 1000 MG: 500 TABLET, FILM COATED ORAL at 16:56

## 2023-08-22 ASSESSMENT — PAIN SCALES - GENERAL
PAINLEVEL_OUTOF10: 4
PAINLEVEL_OUTOF10: 9
PAINLEVEL_OUTOF10: 8
PAINLEVEL_OUTOF10: 1
PAINLEVEL_OUTOF10: 9

## 2023-08-22 ASSESSMENT — PAIN DESCRIPTION - PAIN TYPE: TYPE: SURGICAL PAIN

## 2023-08-22 ASSESSMENT — PAIN DESCRIPTION - LOCATION
LOCATION: KNEE

## 2023-08-22 ASSESSMENT — PAIN DESCRIPTION - ONSET: ONSET: ON-GOING

## 2023-08-22 ASSESSMENT — PAIN DESCRIPTION - DESCRIPTORS
DESCRIPTORS: ACHING
DESCRIPTORS: ACHING
DESCRIPTORS: ACHING;DULL

## 2023-08-22 ASSESSMENT — PAIN DESCRIPTION - ORIENTATION
ORIENTATION: RIGHT
ORIENTATION: RIGHT

## 2023-08-22 ASSESSMENT — PAIN DESCRIPTION - DIRECTION: RADIATING_TOWARDS: NO

## 2023-08-22 ASSESSMENT — PAIN - FUNCTIONAL ASSESSMENT: PAIN_FUNCTIONAL_ASSESSMENT: PREVENTS OR INTERFERES SOME ACTIVE ACTIVITIES AND ADLS

## 2023-08-22 ASSESSMENT — PAIN DESCRIPTION - FREQUENCY: FREQUENCY: INTERMITTENT

## 2023-08-22 NOTE — ANESTHESIA POST-OP
Anesthesia Post-op Note    Dena Betancur    Procedure(s):  ROBOTIC RIGHT KNEE TOTAL ARTHROPLASTY    Patient location: [facility] Floor    Anesthesia type: general and regional    Post-op pain: adequate analgesia    Post-op assessment: Doing very well, plan for discharge tomorrow    Post-op vital signs: stable    Level of consciousness: awake, alert, and oriented    Complications: none    Aravind Garcia MD  Anesthesiology

## 2023-08-22 NOTE — PROGRESS NOTES
Physical Therapy     08/22/23 1400   Restrictions/Precautions   Restrictions/Precautions Weight Bearing   Lower Extremity Weight Bearing Restrictions   Right Lower Extremity Weight Bearing Weight Bearing As Tolerated   Subjective   Subjective pt up in chair/ agreeable to tx   Subjective   Pain 6/10   Bed mobility   Sit to Supine Contact guard assistance   Scooting Stand by assistance   Bed Mobility Comments able to perform task with cuing for bridging with LLE to scoot to [facility] for repositioning post AMB. Transfers   Sit to Stand Contact guard assistance   Stand to Sit Contact guard assistance   Bed to Chair Contact guard assistance   Comment back to bed; no LOB or safety concerns. Ambulation   Device Rolling Walker   Assistance Contact guard assistance   Gait Deviations Slow Leyda;Decreased step length;Decreased step height   Distance 40'   Short Term Goals   Time Frame for Short Term Goals 2 WKS   Short Term Goal 1  FT WITH RW, SBA   Short Term Goal 2 STEPS, CGA   Activity Tolerance   Activity Tolerance Patient limited by pain; Patient limited by endurance   Assessment   Assessment pt limited by pain/endurance cued to increase step length of LLE due to notable initial drag/lag behind. Moderate cuing to stay inside AD/maintain AD closer to self during turns/navigation of tight spaces. Pt left repositioned for comfort in bed with all needs in reach.    PT Plan of Care   Tuesday X   Safety Devices   Type of Devices Left in bed;Gait belt;Bed alarm in place;Call light within reach     Electronically signed by Bubba Mariano PTA on 8/22/2023 at 2:42 PM

## 2023-08-22 NOTE — OP NOTE
[facility] [address], [address] [place] [address]                                OPERATIVE REPORT    PATIENT NAME: Cedric Scott                  :        1945  MED REC NO:   [id number]                              ROOM:       [facility]  ACCOUNT NO:   [de-identified]                           ADMIT DATE: 2023  PROVIDER:     Yue West MD    DATE OF PROCEDURE:  2023    PREOPERATIVE DIAGNOSIS:  Primary osteoarthritis, right knee. POSTOPERATIVE DIAGNOSIS:  Primary osteoarthritis, right knee. PROCEDURE PERFORMED:  Robotic-assisted right total knee replacement. SURGEON:  Yue West MD    FIRST ASSISTANT:  Miranda Mckee PA-C. Please note, he is a critical  assistant in exposure and placement of implants. ANESTHESIA:  General.    EBL:  100 mL. FLUIDS:  1800 mL. TOURNIQUET TIME:  27 minutes. COMPONENTS USED:  Misa Persona knee system, femur size 9 narrow CR TM  press-fit femur, tibia size F TM press-fit tibia, polyethylene is a size  10 MC polyethylene. INDICATIONS:  This is a 67-year-old gentleman with severe arthritis of  the right knee, failing conservative care. Because of this, he elected  for the above procedure. OPERATIVE PROCEDURE:  After informed consent, he was given 2 gm of  Ancef, 1 gm of tranexamic acid, underwent adductor canal block and  general anesthetic. Tourniquet was placed around the right upper thigh. Left leg was placed in SCD. Right leg was prepped and draped in the  usual sterile fashion. Leg was Esmarched. Tourniquet was inflated to  250 mmHg. A midline incision was made. Parapatellar approach was made. Prepatellar fat pad was partially excised. Synovium on the distal femur  was elevated. Superomedial femoral array was placed and proximal tibial  array was placed. We then registered the patient's anatomy with the  robot.     Robotic analysis revealed that the patient was in 3.5 degrees of  flexion, 1.5 degrees of varus. In extension, 4.5 medial and 3 lateral;  and in flexion, 2 medial and 5 lateral.    Femoral settings, size 9 femur, 4 degrees of external rotation, 0 degree  cut, 4 degrees of flexion, 11 mm resection. Tibial settings, 0 degree cut, 7 mm slope, 11 mm resection. Additional Maneuvers:  PCL was released. Final numbers in extension, 22.5 medial, 24 lateral, for a delta of 1.5. Flexion numbers, 18.5 medial, 23 lateral, for a delta 4.5. The distal femoral cut was robotically made. We validated this, placed  our size 9 cutting block. Anterior, posterior and chamfer cuts were  made. Distal medial 12, distal lateral 8.5, posteromedial 9 and  posterolateral 6. The tibia was exposed. Menisci was excised. We  robotically made our tibial resection. Tourniquet was released. We  sized the tibia to a size F tibia which was placed in line with the  anterior tibial crest.  Trial reduction was done. We were tighter in  flexion and extension, so the PCL was released. We also did a limited  release around the proximal medial tibial plateau. We now had a very well balanced and stable knee. A very good bone for  press-fit fixation. Lug holes were drilled for the femur and the tibia. We irrigated with 1500 mL of normal saline. We mixed 20 mL of Exparel  with 30 mL of saline and 50 mL of 0.25% Marcaine. We injected the  posteromedial capsule with 40 mL and 60 mL in the subcutaneous tissues. The final size F tibia was press-fit followed by the size 9 narrow  femoral component. Repeat trial was done. We then irrigated with  another 1500 mL of normal saline. The final size 10 liner was locked in  the tibial tray. Final numbers showed 6 degrees of hyperextension and 1.5 degrees of  varus.   In extension, 3 medial and 3 lateral; and in flexion, 3.5 medial  and 6 lateral.  Parapatellar approach was closed with interrupted #1  Vicryl suture, 2-0 Vicryl suture for

## 2023-08-22 NOTE — CARE COORDINATION
Naila Payne RN Ortho Navigator  [phone]     Patient would like dme item (walker with wheels)  to be delivered to [address]. Patient will discharge home tomorrow before lunch. Please call if you have any questions. Electronically signed by Deja Elizabeth RN on 8/22/2023 at 11:17 AM  Lydia Sal  8/21/2023  8:26 AM   Admission  Description: 69year old male Department: L [facility] Surg Services     Patient Demographics    Name Patient ID SSN Gender Identity Birth Date   Lydia Sal [id number] Male [birth year] (77 yrs)     Address Phone Email     [email or web address] [place] [phone] (W)   [phone] (H) [email or web address]       Reg Status PCP Date Last Verified Next Review Date    Verified Trung Ruff MD  [phone] 08/08/23 08/22/23      Insurance Payors as of 8/22/2023    [facility]    Plan: [address] Group: [id number] Member: [id number]   Effective from: 8/21/2023 Subscriber: Lydia Sal Subscriber ID: [id number]   Guarantor: Lydia Sal     Patient Contacts    Name Relation Home Work Mobile   Paige Vickers Child [phone]       Electronically signed by Deja Elizabeth RN on 8/22/2023 at 11:23 AM

## 2023-08-22 NOTE — PROGRESS NOTES
Physical Therapy  Facility/Department: [facility] SURG SERVICES  Physical Therapy Initial Assessment    Name: Esme Lange  : 1945  MRN: [id number]  Date of Service: 2023    Discharge Recommendations:  Patient would benefit from continued therapy after discharge, Continue to assess pending progress (Pt [facility])   PT Equipment Recommendations  Other: RW      Patient Diagnosis(es): There were no encounter diagnoses. Past Medical History:  has a past medical history of Arthritis, Atrial fibrillation ([address]), Bacteremia due to Gram-positive bacteria, CAD (coronary artery disease), CHB (complete heart block) ([address]), Diabetes mellitus ([address]), GERD (gastroesophageal reflux disease), Hyperlipidemia, Hypertension, Ischemic dilated cardiomyopathy ([address]), Knee pain, Mitral regurgitation, and Pacemaker. Past Surgical History:  has a past surgical history that includes Cardioversion (3/19/14  [facility]); Cardiac catheterization (3/19/14  [facility]); Pacemaker insertion (3/31/14  [facility]); Cardiac valve replacement (2014); Coronary artery bypass graft (2014); Colonoscopy; and Total knee arthroplasty (Right, 2023). Assessment   Body Structures, Functions, Activity Limitations Requiring Skilled Therapeutic Intervention: Decreased functional mobility ; Decreased strength;Decreased balance;Decreased ROM; Increased pain  Assessment: pT WOULD BENEFIT FROM SKILLED PT IN THIS SETTING FOR POST OP TKR PROTOCOL  Therapy Prognosis: Good  Decision Making: Low Complexity  Requires PT Follow-Up: Yes  Activity Tolerance  Activity Tolerance: Patient tolerated treatment well;Patient limited by endurance     Plan   Physcial Therapy Plan  General Plan: 5-7 times per week  Therapy Duration: 2 Weeks  Current Treatment Recommendations: Strengthening, Functional mobility training, Gait training, Patient/Caregiver education & training, Pain management, Safety education & training, Therapeutic activities, Stair

## 2023-08-22 NOTE — CARE COORDINATION
Spoke with patient regarding MD orders for [facility] services. Patient agreeable and has chosen [facility]. Referral Faxed. [phone] [facility] [phone]. [phone]. Please notify [phone] [facility] when patient discharges and fax DC Summary,  DC med list and any new [facility] orders. The Patient and/or patient representative was provided with a choice of provider and agrees   with the discharge plan. [x] Yes [] No    Freedom of choice list was provided with basic dialogue that supports the patient's individualized plan of care/goals, treatment preferences and shares the quality data associated with the providers.  [x] Yes [] No  Electronically signed by Melissa Chow on 8/22/2023 at 9:44 AM

## 2023-08-22 NOTE — PLAN OF CARE
Problem: Discharge Planning  Goal: Discharge to home or other facility with appropriate resources  Outcome: Progressing  Flowsheets  Taken 8/22/2023 0709 by Prince Roshan LPN  Discharge to home or other facility with appropriate resources:   Identify barriers to discharge with patient and caregiver   Arrange for needed discharge resources and transportation as appropriate   Identify discharge learning needs (meds, wound care, etc)   Refer to discharge planning if patient needs post-hospital services based on physician order or complex needs related to functional status, cognitive ability or social support system  Taken 8/21/2023 1945 by Irineo Jensen RN  Discharge to home or other facility with appropriate resources:   Identify barriers to discharge with patient and caregiver   Arrange for needed discharge resources and transportation as appropriate   Identify discharge learning needs (meds, wound care, etc)   Refer to discharge planning if patient needs post-hospital services based on physician order or complex needs related to functional status, cognitive ability or social support system     Problem: Pain  Goal: Verbalizes/displays adequate comfort level or baseline comfort level  Outcome: Progressing     Problem: Chronic Conditions and Co-morbidities  Goal: Patient's chronic conditions and co-morbidity symptoms are monitored and maintained or improved  Outcome: Progressing  Flowsheets  Taken 8/22/2023 0709 by Prince Roshan LPN  Care Plan - Patient's Chronic Conditions and Co-Morbidity Symptoms are Monitored and Maintained or Improved:   Monitor and assess patient's chronic conditions and comorbid symptoms for stability, deterioration, or improvement   Collaborate with multidisciplinary team to address chronic and comorbid conditions and prevent exacerbation or deterioration   Update acute care plan with appropriate goals if chronic or comorbid symptoms are exacerbated and prevent overall improvement and

## 2023-08-22 NOTE — ADDENDUM NOTE
Addendum  created 08/22/23 7484 by Mar Leger MD    Clinical Note Signed · Rate controlled on metoprolol, anticoagulated with Eliquis  · Heart rate regular. · Will need echocardiogram once volume optimized. · Cardiology following. · Monitor on telemetry.

## 2023-08-22 NOTE — PROGRESS NOTES
Comprehensive Nutrition Assessment    Type and Reason for Visit:  Initial, Positive Nutrition Screen    Nutrition Recommendations/Plan:   Modify diet to CHO 3. Start double protein portions with meals. Malnutrition Assessment:  Malnutrition Status:  No malnutrition (08/22/23 1058)    Context:  Acute Illness     Findings of the 6 clinical characteristics of malnutrition:  Energy Intake:  No significant decrease in energy intake  Weight Loss:  No significant weight loss     Body Fat Loss:  No significant body fat loss     Muscle Mass Loss:  No significant muscle mass loss    Fluid Accumulation:  No significant fluid accumulation     Strength:  Not Performed    Nutrition Assessment:    Pt is adequately nourished with good PO intake of meals. Blood glucose levels are ranging from 171-100. HgbA1C 6.9. Will modify diet to CHO 3 for better blood sugar control. Start double protein portions with meals. Nutrition Related Findings:    Last BM 8/20 Wound Type: Surgical Incision       Current Nutrition Intake & Therapies:    Average Meal Intake: %  ADULT DIET; Regular; 4 carb choices (60 gm/meal)    Anthropometric Measures:  Height: 5' 10\" (177.8 cm)  Ideal Body Weight (IBW): 166 lbs (75 kg)    Current Body Weight: 210 lb (95.3 kg)  Current BMI (kg/m2): 30.1  BMI Categories: Obese Class 1 (BMI 30.0-34. 9)    Estimated Daily Nutrient Needs:  Energy Requirements Based On: Kcal/kg  Weight Used for Energy Requirements: Current  Energy (kcal/day): 3159-3184 kcals/day  Weight Used for Protein Requirements: Ideal  Protein (g/day):  g/protein/day  Method Used for Fluid Requirements: 1 ml/kcal  Fluid (ml/day): 6073-5573 mL/day    Nutrition Diagnosis:   Altered nutrition-related lab values related to endocrine dysfuntion as evidenced by lab values    Nutrition Interventions:   Food and/or Nutrient Delivery: Modify Current Diet, IV Fluid Delivery  Coordination of Nutrition Care: Continue to monitor while inpatient    Goals:  Goals: PO intake 75% or greater, Meet at least 75% of estimated needs    Nutrition Monitoring and Evaluation:   Food/Nutrient Intake Outcomes: Food and Nutrient Intake  Physical Signs/Symptoms Outcomes: Biochemical Data, Nutrition Focused Physical Findings, Skin, Weight    Jennifer Mcnamara MS, RD, LD  Contact: [phone]

## 2023-08-22 NOTE — CONSULTS
Referring Provider: Dr. Matt Traylor  Reason for Consultation: Medical management    Patient Care Team:  Chriss Puentes MD as PCP - General (Family Medicine)  Chriss Puentes MD as PCP - Empaneled Provider  Mala Lee MD as Consulting Physician (Interventional Cardiology)    Chief complaint right knee pain    Subjective . History of present illness: The patient presents to the orthopedic service for TKA. They have failed outpatient conservative treatment of NSAIDS, muscle relaxer, physical therapy and opioid pain meds. The pain is affecting their activities of daily living and they have chosen to undergo surgical correction. We have been asked to provide medical consultation by the attending physician since their primary care provider does not attend here at [address] in their healthcare during the perioperative phase was discussed with the patient. All questions were encouraged and answered to the best of our ability. The postoperative pain is as expected. There are no other participating or relieving factors noted.        REVIEW OF SYSTEMS:    CONSTITUTIONAL:  Negative for anorexia, chills, fevers, night sweats and weight loss  EYES:  negative for eye dryness, icterus and redness  HEENT:   negative for dental problems, epistaxis, facial trauma and thrush  RESPIRATORY:  negative for chest tightness, cough, dyspnea on exertion, pneumonia and sputum  CARDIOVASCULAR: negative for chest pain, dyspnea, exertional chest pressure/discomfort, irregular heart beat, palpitations, paroxysmal nocturnal dyspnea and syncope  GASTROINTESTINAL:  negative for abdominal pain, hematemesis, jaundice, melena and rectal bleeding  MUSCULOSKELETAL:  negative for muscle weakness, myalgias and neck pain, chronic joint pain noted  NEUROLOGICAL:   negative for dizziness, headaches, seizures, speech problems, tremors and vertigo  INTEGUMENT: negative for pruritus, rash, skin color change and skin lesion(s)   A to monitor  3. Essential hypertension--continue present medications  4. CAD (coronary artery disease)--no complaints of chest pain  5. Mixed hyperlipidemia--statin therapy  6. Type 2 diabetes mellitus with hyperglycemia---Blood sugars noted. Explained to patient the need for better control to optimize the healing process. Reviewed home medication regimen, IV fluids, and dietary intake over the last 24 hours to help determine the etiology of the hyperglycemia. Adjustments made and discussed with staff, see orders for further details. 7.  Gastroesophageal reflux disease--- continue PPI and antireflux measures  8. Drug-induced constipation--start bowel regimen  9. Postoperative low Hgb--stable, expected, being monitored with daily labs. Anemia profile reviewed, orders for iron replacement. Hgb 88    69year-old patient resides in [place], [place]. PMHx paroxysmal A-fib, HTN, CAD, HLD, T2DM, GERD and osteoarthritis. Medically stable p.o. #1 right TKA. Adjusting diet and medications for better glycemic control. Ready and willing to work with therapy in anticipation for potential discharge home tomorrow. I discussed the patients findings and my recommendations with patient/family, staff and the Orthopaedic team.  Daniel Diaz PA-C  08/22/23  6:21 AM    Postop day #1 from right total knee, doing well postoperatively. Start PT OT. Plan discharge home 1-2 days. I have discussed the care of Veronika Peoples, including pertinent history and exam findings with the ARNP/PA. I have seen and examined the patient and the key elements of all parts of the encounter have been performed by me. I agree with the assessment and plan as outlined by the ARNP/PA. Please refer to my separate note for complete documentation.      Electronically signed by Swathi Dior MD on 8/22/2023 at 7:26 AM

## 2023-08-23 LAB
ANION GAP SERPL CALCULATED.3IONS-SCNC: 12 MMOL/L (ref 7–19)
BUN SERPL-MCNC: 18 MG/DL (ref 8–23)
CALCIUM SERPL-MCNC: 8.6 MG/DL (ref 8.8–10.2)
CHLORIDE SERPL-SCNC: 101 MMOL/L (ref 98–111)
CO2 SERPL-SCNC: 19 MMOL/L (ref 22–29)
CREAT SERPL-MCNC: 0.9 MG/DL (ref 0.5–1.2)
GLUCOSE BLD-MCNC: 139 MG/DL (ref 70–99)
GLUCOSE BLD-MCNC: 149 MG/DL (ref 70–99)
GLUCOSE BLD-MCNC: 174 MG/DL (ref 70–99)
GLUCOSE BLD-MCNC: 176 MG/DL (ref 70–99)
GLUCOSE SERPL-MCNC: 173 MG/DL (ref 74–109)
HCT VFR BLD AUTO: 30.8 % (ref 42–52)
HGB BLD-MCNC: 10.3 G/DL (ref 14–18)
PERFORMED ON: ABNORMAL
POTASSIUM SERPL-SCNC: 4.4 MMOL/L (ref 3.5–5)
SODIUM SERPL-SCNC: 132 MMOL/L (ref 136–145)

## 2023-08-23 PROCEDURE — 2580000003 HC RX 258: Performed by: ORTHOPAEDIC SURGERY

## 2023-08-23 PROCEDURE — 82962 GLUCOSE BLOOD TEST: CPT

## 2023-08-23 PROCEDURE — 85014 HEMATOCRIT: CPT

## 2023-08-23 PROCEDURE — 97530 THERAPEUTIC ACTIVITIES: CPT

## 2023-08-23 PROCEDURE — G0378 HOSPITAL OBSERVATION PER HR: HCPCS

## 2023-08-23 PROCEDURE — 36415 COLL VENOUS BLD VENIPUNCTURE: CPT

## 2023-08-23 PROCEDURE — 80048 BASIC METABOLIC PNL TOTAL CA: CPT

## 2023-08-23 PROCEDURE — 6370000000 HC RX 637 (ALT 250 FOR IP): Performed by: FAMILY MEDICINE

## 2023-08-23 PROCEDURE — 6370000000 HC RX 637 (ALT 250 FOR IP): Performed by: PHYSICIAN ASSISTANT

## 2023-08-23 PROCEDURE — 96361 HYDRATE IV INFUSION ADD-ON: CPT

## 2023-08-23 PROCEDURE — 96376 TX/PRO/DX INJ SAME DRUG ADON: CPT

## 2023-08-23 PROCEDURE — 6370000000 HC RX 637 (ALT 250 FOR IP): Performed by: ORTHOPAEDIC SURGERY

## 2023-08-23 PROCEDURE — 6360000002 HC RX W HCPCS: Performed by: ORTHOPAEDIC SURGERY

## 2023-08-23 PROCEDURE — 85018 HEMOGLOBIN: CPT

## 2023-08-23 RX ORDER — NALOXONE HYDROCHLORIDE 4 MG/.1ML
1 SPRAY NASAL PRN
Qty: 1 EACH | Refills: 0 | Status: SHIPPED | OUTPATIENT
Start: 2023-08-23

## 2023-08-23 RX ORDER — TIZANIDINE 2 MG/1
2 TABLET ORAL 4 TIMES DAILY PRN
Qty: 40 TABLET | Refills: 1 | Status: SHIPPED | OUTPATIENT
Start: 2023-08-23

## 2023-08-23 RX ORDER — TIZANIDINE 4 MG/1
4 TABLET ORAL EVERY 8 HOURS PRN
Status: DISCONTINUED | OUTPATIENT
Start: 2023-08-23 | End: 2023-08-24 | Stop reason: HOSPADM

## 2023-08-23 RX ORDER — ASPIRIN 81 MG/1
81 TABLET ORAL 2 TIMES DAILY
Qty: 30 TABLET | Refills: 3 | Status: SHIPPED | OUTPATIENT
Start: 2023-08-23

## 2023-08-23 RX ORDER — OXYCODONE HYDROCHLORIDE 10 MG/1
10 TABLET ORAL EVERY 4 HOURS PRN
Qty: 60 TABLET | Refills: 0 | Status: SHIPPED | OUTPATIENT
Start: 2023-08-23 | End: 2023-09-22

## 2023-08-23 RX ADMIN — TRAMADOL HYDROCHLORIDE 50 MG: 50 TABLET, COATED ORAL at 15:28

## 2023-08-23 RX ADMIN — TRAMADOL HYDROCHLORIDE 50 MG: 50 TABLET, COATED ORAL at 20:33

## 2023-08-23 RX ADMIN — FERROUS SULFATE TAB 325 MG (65 MG ELEMENTAL FE) 325 MG: 325 (65 FE) TAB at 08:55

## 2023-08-23 RX ADMIN — OXYCODONE HYDROCHLORIDE 15 MG: 5 TABLET ORAL at 11:39

## 2023-08-23 RX ADMIN — METFORMIN HYDROCHLORIDE 1000 MG: 500 TABLET, FILM COATED ORAL at 08:56

## 2023-08-23 RX ADMIN — WATER 2000 MG: 1 INJECTION INTRAMUSCULAR; INTRAVENOUS; SUBCUTANEOUS at 02:51

## 2023-08-23 RX ADMIN — POLYETHYLENE GLYCOL 3350 17 G: 17 POWDER, FOR SOLUTION ORAL at 08:55

## 2023-08-23 RX ADMIN — OXYCODONE HYDROCHLORIDE 15 MG: 5 TABLET ORAL at 07:30

## 2023-08-23 RX ADMIN — CARVEDILOL 12.5 MG: 12.5 TABLET, FILM COATED ORAL at 20:34

## 2023-08-23 RX ADMIN — ACETAMINOPHEN 650 MG: 325 TABLET ORAL at 08:56

## 2023-08-23 RX ADMIN — TRAMADOL HYDROCHLORIDE 50 MG: 50 TABLET, COATED ORAL at 02:51

## 2023-08-23 RX ADMIN — ACETAMINOPHEN 650 MG: 325 TABLET ORAL at 20:33

## 2023-08-23 RX ADMIN — OXYCODONE HYDROCHLORIDE 15 MG: 5 TABLET ORAL at 16:23

## 2023-08-23 RX ADMIN — CLOPIDOGREL BISULFATE 75 MG: 75 TABLET ORAL at 08:56

## 2023-08-23 RX ADMIN — CARVEDILOL 12.5 MG: 12.5 TABLET, FILM COATED ORAL at 08:55

## 2023-08-23 RX ADMIN — BISACODYL 5 MG: 5 TABLET, COATED ORAL at 08:56

## 2023-08-23 RX ADMIN — ACETAMINOPHEN 650 MG: 325 TABLET ORAL at 15:28

## 2023-08-23 RX ADMIN — TIZANIDINE 4 MG: 4 TABLET ORAL at 17:51

## 2023-08-23 RX ADMIN — TRAMADOL HYDROCHLORIDE 50 MG: 50 TABLET, COATED ORAL at 08:56

## 2023-08-23 RX ADMIN — POLYETHYLENE GLYCOL 3350 17 G: 17 POWDER, FOR SOLUTION ORAL at 20:34

## 2023-08-23 RX ADMIN — ASPIRIN 81 MG: 81 TABLET, COATED ORAL at 20:34

## 2023-08-23 RX ADMIN — SODIUM CHLORIDE, PRESERVATIVE FREE 10 ML: 5 INJECTION INTRAVENOUS at 20:34

## 2023-08-23 RX ADMIN — ASPIRIN 81 MG: 81 TABLET, COATED ORAL at 08:56

## 2023-08-23 RX ADMIN — WATER 2000 MG: 1 INJECTION INTRAMUSCULAR; INTRAVENOUS; SUBCUTANEOUS at 11:40

## 2023-08-23 RX ADMIN — ATORVASTATIN CALCIUM 20 MG: 20 TABLET, FILM COATED ORAL at 08:56

## 2023-08-23 RX ADMIN — ACETAMINOPHEN 650 MG: 325 TABLET ORAL at 02:51

## 2023-08-23 RX ADMIN — HYDROMORPHONE HYDROCHLORIDE 1 MG: 1 INJECTION, SOLUTION INTRAMUSCULAR; INTRAVENOUS; SUBCUTANEOUS at 09:52

## 2023-08-23 RX ADMIN — PANTOPRAZOLE SODIUM 40 MG: 40 TABLET, DELAYED RELEASE ORAL at 08:56

## 2023-08-23 RX ADMIN — SODIUM CHLORIDE, PRESERVATIVE FREE 10 ML: 5 INJECTION INTRAVENOUS at 08:58

## 2023-08-23 ASSESSMENT — PAIN DESCRIPTION - PAIN TYPE
TYPE: SURGICAL PAIN
TYPE: SURGICAL PAIN

## 2023-08-23 ASSESSMENT — PAIN DESCRIPTION - DESCRIPTORS
DESCRIPTORS: ACHING;DULL
DESCRIPTORS: ACHING;DULL
DESCRIPTORS: ACHING;CRAMPING

## 2023-08-23 ASSESSMENT — PAIN SCALES - GENERAL
PAINLEVEL_OUTOF10: 5
PAINLEVEL_OUTOF10: 9
PAINLEVEL_OUTOF10: 9
PAINLEVEL_OUTOF10: 8
PAINLEVEL_OUTOF10: 7
PAINLEVEL_OUTOF10: 8
PAINLEVEL_OUTOF10: 7
PAINLEVEL_OUTOF10: 8
PAINLEVEL_OUTOF10: 4
PAINLEVEL_OUTOF10: 1

## 2023-08-23 ASSESSMENT — PAIN DESCRIPTION - LOCATION
LOCATION: KNEE

## 2023-08-23 ASSESSMENT — PAIN DESCRIPTION - FREQUENCY
FREQUENCY: INTERMITTENT
FREQUENCY: INTERMITTENT

## 2023-08-23 ASSESSMENT — PAIN DESCRIPTION - ORIENTATION
ORIENTATION: RIGHT

## 2023-08-23 ASSESSMENT — PAIN - FUNCTIONAL ASSESSMENT
PAIN_FUNCTIONAL_ASSESSMENT: PREVENTS OR INTERFERES SOME ACTIVE ACTIVITIES AND ADLS
PAIN_FUNCTIONAL_ASSESSMENT: PREVENTS OR INTERFERES SOME ACTIVE ACTIVITIES AND ADLS

## 2023-08-23 NOTE — PROGRESS NOTES
Physical Therapy  Name: Zan Gomez  MRN:  [id number]  Date of service:  8/23/2023 08/23/23 1402   Restrictions/Precautions   Restrictions/Precautions Weight Bearing   Lower Extremity Weight Bearing Restrictions   Right Lower Extremity Weight Bearing Weight Bearing As Tolerated   Subjective   Subjective Pt agreed to therapy. Has had pain meds. Pain Assessment   Pain Assessment 0-10   Pain Level 8   Pain Location Knee   Pain Orientation Right   Pain Descriptors Aching;Dull   Functional Pain Assessment Prevents or interferes some active activities and ADLs   Pain Type Surgical pain   Pain Frequency Intermittent   Non-Pharmaceutical Pain Intervention(s) Ambulation/Increased Activity;Repositioned;Rest;Cold pack   Response to Pain Intervention Patient satisfied   Bed Mobility   Supine to Sit Moderate assistance   Sit to Supine Minimal assistance   Comment sat EOB working on getting sitting balance a few minutes   Transfers   Sit to Stand Moderate Assistance   Stand to Sit Minimal Assistance   Ambulation   Surface Level tile   Device Rolling Walker   Assistance Contact guard assistance   Gait Deviations Slow Leyda;Decreased step length;Decreased step height   Distance 3'   Short Term Goals   Time Frame for Short Term Goals 2 WKS   Short Term Goal 1  FT WITH RW, SBA   Short Term Goal 2 STEPS, CGA   Conditions Requiring Skilled Therapeutic Intervention   Body Structures, Functions, Activity Limitations Requiring Skilled Therapeutic Intervention Decreased functional mobility ; Decreased strength;Decreased balance;Decreased ROM; Increased pain   Assessment Pt worked on sit to/from stand technique with some improvement but still requiring assist. Continues to demonstrate posterior lean but able to correct with cueing. Positioned in bed with all needs in reach.    Activity Tolerance   Activity Tolerance Patient limited by pain   PT Plan of Care   Wednesday X   Safety Devices   Type of Devices Call light within

## 2023-08-23 NOTE — PROGRESS NOTES
x-rays that are available. Updated overnight status with nursing staff. Reviewed the case with Chapman Boeck, PA-C. All questions were answered to the patient's/family's understanding. Patient is medically stable, please see orders for further details. We were asked to provide medical consultation by the attending physician during the perioperative phase. They will return to their primary care provider and have been instructed to follow up with them concerning abnormal lab/x-rays. Questions were encouraged and answered to the best of our ability. We will be available for 30 days or until they return to their primary care provider, if they return to the emergency room in the next 30 days with a kofi-operative issue we will gladly admit them. Otherwise, they will be admitted to the hospitalist service. All questions and concerns addressed prior to discharge. I have discussed the care of Lydia Sal, including pertinent history and exam findings with the ARNP/PA. I have seen and examined the patient and the key elements of all parts of the encounter have been performed by me. I agree with the assessment and plan as outlined by the ARNP/PA. Please refer to my separate note for complete documentation.      Electronically signed by Nik Marroquin MD on 8/23/2023 at 7:57 AM

## 2023-08-23 NOTE — PROGRESS NOTES
Physical Therapy  Name: Wisam Headley  MRN:  [id number]  Date of service:  8/23/2023 08/23/23 0840   Restrictions/Precautions   Restrictions/Precautions Weight Bearing   Lower Extremity Weight Bearing Restrictions   Right Lower Extremity Weight Bearing Weight Bearing As Tolerated   Subjective   Subjective Pt agreed to therapy. Reports he had pain meds earlier. Pain Assessment   Pain Assessment 0-10   Pain Level 8   Pain Location Knee   Pain Orientation Right   Pain Descriptors Aching;Dull   Functional Pain Assessment Prevents or interferes some active activities and ADLs   Pain Type Surgical pain   Pain Frequency Intermittent   Non-Pharmaceutical Pain Intervention(s) Ambulation/Increased Activity;Repositioned; Rest   Response to Pain Intervention Patient satisfied   Bed Mobility   Supine to Sit Moderate assistance  (increased time given, pt used gait belt to assist RLE OOB but required assist to scoot to EOB)   Transfers   Sit to Stand Moderate Assistance   Stand to Sit Minimal Assistance   Bed to Chair Minimal assistance  (stand step with RW)   Comment 2 attempts to stand with about 3-4 min rest in between; v/c's for STS technique, working on fwd lean, pt with some posterior lean in standing also   Short Term Goals   Time Frame for Short Term Goals 2 WKS   Short Term Goal 1  FT WITH RW, SBA   Short Term Goal 2 STEPS, CGA   Conditions Requiring Skilled Therapeutic Intervention   Body Structures, Functions, Activity Limitations Requiring Skilled Therapeutic Intervention Decreased functional mobility ; Decreased strength;Decreased balance;Decreased ROM; Increased pain   Assessment Pt having increased difficulty with mobility this morning. Requiring more assist for bed mobility and transfers. Difficulty with fwd lean to stand but able to achieve stand on second attempt. Antalgic with small steps when transferring. Positioned in chair with all needs in reach.    Activity Tolerance   Activity Tolerance Patient

## 2023-08-24 VITALS
DIASTOLIC BLOOD PRESSURE: 64 MMHG | RESPIRATION RATE: 18 BRPM | HEART RATE: 83 BPM | SYSTOLIC BLOOD PRESSURE: 127 MMHG | TEMPERATURE: 96.9 F | BODY MASS INDEX: 30.06 KG/M2 | WEIGHT: 210 LBS | OXYGEN SATURATION: 98 % | HEIGHT: 70 IN

## 2023-08-24 LAB
ANION GAP SERPL CALCULATED.3IONS-SCNC: 11 MMOL/L (ref 7–19)
BUN SERPL-MCNC: 13 MG/DL (ref 8–23)
CALCIUM SERPL-MCNC: 8.8 MG/DL (ref 8.8–10.2)
CHLORIDE SERPL-SCNC: 98 MMOL/L (ref 98–111)
CO2 SERPL-SCNC: 21 MMOL/L (ref 22–29)
CREAT SERPL-MCNC: 0.8 MG/DL (ref 0.5–1.2)
GLUCOSE BLD-MCNC: 159 MG/DL (ref 70–99)
GLUCOSE BLD-MCNC: 160 MG/DL (ref 70–99)
GLUCOSE SERPL-MCNC: 151 MG/DL (ref 74–109)
HCT VFR BLD AUTO: 29.6 % (ref 42–52)
HGB BLD-MCNC: 10.1 G/DL (ref 14–18)
PERFORMED ON: ABNORMAL
PERFORMED ON: ABNORMAL
POTASSIUM SERPL-SCNC: 4.4 MMOL/L (ref 3.5–5)
SODIUM SERPL-SCNC: 130 MMOL/L (ref 136–145)

## 2023-08-24 PROCEDURE — 82962 GLUCOSE BLOOD TEST: CPT

## 2023-08-24 PROCEDURE — 85014 HEMATOCRIT: CPT

## 2023-08-24 PROCEDURE — 85018 HEMOGLOBIN: CPT

## 2023-08-24 PROCEDURE — G0378 HOSPITAL OBSERVATION PER HR: HCPCS

## 2023-08-24 PROCEDURE — 97116 GAIT TRAINING THERAPY: CPT

## 2023-08-24 PROCEDURE — 94760 N-INVAS EAR/PLS OXIMETRY 1: CPT

## 2023-08-24 PROCEDURE — 6370000000 HC RX 637 (ALT 250 FOR IP): Performed by: PHYSICIAN ASSISTANT

## 2023-08-24 PROCEDURE — 6370000000 HC RX 637 (ALT 250 FOR IP): Performed by: FAMILY MEDICINE

## 2023-08-24 PROCEDURE — 6370000000 HC RX 637 (ALT 250 FOR IP): Performed by: ORTHOPAEDIC SURGERY

## 2023-08-24 PROCEDURE — 2580000003 HC RX 258: Performed by: ORTHOPAEDIC SURGERY

## 2023-08-24 PROCEDURE — 97530 THERAPEUTIC ACTIVITIES: CPT

## 2023-08-24 PROCEDURE — 36415 COLL VENOUS BLD VENIPUNCTURE: CPT

## 2023-08-24 PROCEDURE — 80048 BASIC METABOLIC PNL TOTAL CA: CPT

## 2023-08-24 RX ADMIN — CARVEDILOL 12.5 MG: 12.5 TABLET, FILM COATED ORAL at 09:20

## 2023-08-24 RX ADMIN — SODIUM CHLORIDE, PRESERVATIVE FREE 10 ML: 5 INJECTION INTRAVENOUS at 09:22

## 2023-08-24 RX ADMIN — TRAMADOL HYDROCHLORIDE 50 MG: 50 TABLET, COATED ORAL at 03:52

## 2023-08-24 RX ADMIN — TRAMADOL HYDROCHLORIDE 50 MG: 50 TABLET, COATED ORAL at 09:19

## 2023-08-24 RX ADMIN — PANTOPRAZOLE SODIUM 40 MG: 40 TABLET, DELAYED RELEASE ORAL at 09:19

## 2023-08-24 RX ADMIN — ACETAMINOPHEN 650 MG: 325 TABLET ORAL at 09:19

## 2023-08-24 RX ADMIN — ASPIRIN 81 MG: 81 TABLET, COATED ORAL at 09:20

## 2023-08-24 RX ADMIN — ACETAMINOPHEN 650 MG: 325 TABLET ORAL at 03:52

## 2023-08-24 RX ADMIN — METFORMIN HYDROCHLORIDE 1000 MG: 500 TABLET, FILM COATED ORAL at 09:20

## 2023-08-24 RX ADMIN — ATORVASTATIN CALCIUM 20 MG: 20 TABLET, FILM COATED ORAL at 09:19

## 2023-08-24 RX ADMIN — FERROUS SULFATE TAB 325 MG (65 MG ELEMENTAL FE) 325 MG: 325 (65 FE) TAB at 09:20

## 2023-08-24 RX ADMIN — POLYETHYLENE GLYCOL 3350 17 G: 17 POWDER, FOR SOLUTION ORAL at 09:21

## 2023-08-24 RX ADMIN — OXYCODONE HYDROCHLORIDE 15 MG: 5 TABLET ORAL at 13:01

## 2023-08-24 RX ADMIN — CLOPIDOGREL BISULFATE 75 MG: 75 TABLET ORAL at 09:20

## 2023-08-24 RX ADMIN — CHLORPROMAZINE HYDROCHLORIDE 25 MG: 25 TABLET, FILM COATED ORAL at 09:30

## 2023-08-24 ASSESSMENT — PAIN - FUNCTIONAL ASSESSMENT
PAIN_FUNCTIONAL_ASSESSMENT: PREVENTS OR INTERFERES SOME ACTIVE ACTIVITIES AND ADLS

## 2023-08-24 ASSESSMENT — PAIN DESCRIPTION - LOCATION
LOCATION: KNEE
LOCATION: KNEE;BACK

## 2023-08-24 ASSESSMENT — PAIN DESCRIPTION - ORIENTATION
ORIENTATION: RIGHT

## 2023-08-24 ASSESSMENT — PAIN DESCRIPTION - PAIN TYPE
TYPE: SURGICAL PAIN
TYPE: SURGICAL PAIN

## 2023-08-24 ASSESSMENT — PAIN DESCRIPTION - DESCRIPTORS
DESCRIPTORS: ACHING
DESCRIPTORS: ACHING;CRAMPING
DESCRIPTORS: ACHING;DULL
DESCRIPTORS: ACHING
DESCRIPTORS: ACHING

## 2023-08-24 ASSESSMENT — PAIN SCALES - GENERAL
PAINLEVEL_OUTOF10: 1
PAINLEVEL_OUTOF10: 8
PAINLEVEL_OUTOF10: 8
PAINLEVEL_OUTOF10: 2
PAINLEVEL_OUTOF10: 6
PAINLEVEL_OUTOF10: 3

## 2023-08-24 ASSESSMENT — PAIN DESCRIPTION - FREQUENCY
FREQUENCY: INTERMITTENT
FREQUENCY: INTERMITTENT

## 2023-08-24 NOTE — PROGRESS NOTES
Physical Therapy  Name: Zan Gomez  MRN:  [id number]  Date of service:  8/24/2023 08/24/23 9946   Restrictions/Precautions   Restrictions/Precautions Weight Bearing   Lower Extremity Weight Bearing Restrictions   Right Lower Extremity Weight Bearing Weight Bearing As Tolerated   Subjective   Subjective Pt agreed to therapy. Pain Assessment   Pain Assessment 0-10   Pain Level 6   Pain Location Knee;Back   Pain Orientation Right   Pain Descriptors Aching   Functional Pain Assessment Prevents or interferes some active activities and ADLs   Pain Type Surgical pain   Pain Frequency Intermittent   Non-Pharmaceutical Pain Intervention(s) Ambulation/Increased Activity;Repositioned;Rest;Cold pack   Response to Pain Intervention Patient satisfied   Bed Mobility   Supine to Sit Minimal assistance   Transfers   Sit to Stand Stand by assistance   Stand to Sit Stand by assistance   Ambulation   Surface Level tile   Device Rolling Walker   Assistance Contact guard assistance   Gait Deviations Slow Leyda;Decreased step length;Decreased step height   Distance 30'   Short Term Goals   Time Frame for Short Term Goals 2 WKS   Short Term Goal 1  FT WITH RW, SBA   Short Term Goal 2 STEPS, CGA   Conditions Requiring Skilled Therapeutic Intervention   Body Structures, Functions, Activity Limitations Requiring Skilled Therapeutic Intervention Decreased functional mobility ; Decreased strength;Decreased balance;Decreased ROM; Increased pain   Assessment Pt mobility improved today and reports back pain is better today. Able to amb in room without LOB and tolerated well. Fairly steady overall. Assisted up to recliner with all needs in reach. Activity Tolerance   Activity Tolerance Patient tolerated treatment well   PT Plan of Care   Thursday X   Safety Devices   Type of Devices Call light within reach; Left in chair         Electronically signed by Jovanna Shields PTA on 8/24/2023 at 9:25 AM

## 2023-08-24 NOTE — PROGRESS NOTES
Patient discharged home today with [facility]. Facility notified of patient's discharge and all documents were faxed. P ; F .    Electronically signed by Homero Jesus RN on 8/24/2023 at 12:58 PM

## 2023-08-24 NOTE — PROGRESS NOTES
CLINICAL PHARMACY NOTE: MEDS TO BEDS    Total # of Prescriptions Filled: 4   The following medications were delivered to the patient:  Current Discharge Medication List        START taking these medications    Details   oxyCODONE (OXY-IR) 10 MG immediate release tablet Take 1 tablet by mouth every 4 hours as needed for Pain for up to 30 days. Max Daily Amount: 60 mg  Qty: 60 tablet, Refills: 0    Comments: Reduce doses taken as pain becomes manageable  Associated Diagnoses: Primary osteoarthritis of right knee      aspirin 81 MG EC tablet Take 1 tablet by mouth 2 times daily  Qty: 30 tablet, Refills: 3      tiZANidine (ZANAFLEX) 2 MG tablet Take 1 tablet by mouth 4 times daily as needed (spasms)  Qty: 40 tablet, Refills: 1      naloxone 4 MG/0.1ML LIQD nasal spray 1 spray by Nasal route as needed for Opioid Reversal  Qty: 1 each, Refills: 0               Additional Documentation:    Handed scripts to patients family member and patient was alert as well. Paid with card.

## 2023-08-24 NOTE — PROGRESS NOTES
Patient discharged home today with [facility]. Went over all discharge instructions and new medications with patient and provided a copy of all new medications to take home. Patient verbalized understanding. Patient was stable upon discharge.    Electronically signed by Nila Cheadle, RN on 8/24/2023 at 12:57 PM

## 2023-08-24 NOTE — PROGRESS NOTES
Patient received discharge information and medications. Patients IV had been previously removed. Patient and family did not have any questions or comments at this time. Patient transported to the main entrance where he left in a private vehicle.

## 2023-08-24 NOTE — PROGRESS NOTES
Physical Therapy  Name: Sean Harvey  MRN:  [id number]  Date of service:  8/24/2023 08/24/23 1315   Restrictions/Precautions   Restrictions/Precautions Weight Bearing   Lower Extremity Weight Bearing Restrictions   Right Lower Extremity Weight Bearing Weight Bearing As Tolerated   Subjective   Subjective Pt agreed to therapy. Just received pain meds. Pain Assessment   Pain Assessment 0-10   Pain Level 8   Pain Location Knee   Pain Orientation Right   Pain Descriptors Aching   Functional Pain Assessment Prevents or interferes some active activities and ADLs   Pain Type Surgical pain   Pain Frequency Intermittent   Non-Pharmaceutical Pain Intervention(s) Ambulation/Increased Activity;Repositioned; Rest   Response to Pain Intervention Patient satisfied   Transfers   Sit to Stand Contact guard assistance   Stand to Sit Contact guard assistance   Comment some posterior lean in standing with min assist at times to correct   Ambulation   Surface Level tile   Device Rolling Walker   Assistance Contact guard assistance   Gait Deviations Slow Leyda;Decreased step length;Decreased step height   Distance 10' x 2   Stairs/Curb   Stairs? Yes   Stairs   # Steps  5   Stairs Height 6\"  (4\")   Rails Bilateral   Device No Device   Assistance Contact guard assistance;Minimal assistance  (mod assist with very first step but then improved)   Comment v/c's for correct pattern   Short Term Goals   Time Frame for Short Term Goals 2 WKS   Short Term Goal 1  FT WITH RW, SBA   Short Term Goal 2 STEPS, CGA   Conditions Requiring Skilled Therapeutic Intervention   Body Structures, Functions, Activity Limitations Requiring Skilled Therapeutic Intervention Decreased functional mobility ; Decreased strength;Decreased balance;Decreased ROM; Increased pain   Assessment Pt able to complete stair training with correct technique. Stability improved after ascending first step. Unsteady with sit to/from stand at times.  Recommended to pt to have assist with transfers and amb at home at first. Assisted back to chair with all needs in reach. Activity Tolerance   Activity Tolerance Patient tolerated treatment well   PT Plan of Care   Thursday X   Safety Devices   Type of Devices Call light within reach; Left in chair           Electronically signed by Jovanna Shields PTA on 8/24/2023 at 1:21 PM

## 2023-08-24 NOTE — PROGRESS NOTES
[facility]  Daily Progress Note  Sabrina Torres  MRN: [id number] LOS: 0    Admit Date: 2023 6:17 AM          Chief Complaint:  Right knee pain    Interval History:    Reviewed overnight events and nursing notes. Status:  improved  Pain:  some relief  Gen: No fevers  HEENT: No migraine or visual change  Lung: No cough, hemopotysis or wheezing  Cv: No chest pain or pressure  Abd: No nausea or vomit, no change in bowel fct, no gi bleeding    DIET:  ADULT DIET;  Regular; 3 carb choices (45 gm/meal)    Medications:      sodium chloride Stopped (23 0840)    sodium chloride        insulin lispro  0-8 Units SubCUTAneous TID WC    insulin lispro  0-4 Units SubCUTAneous Nightly    ferrous sulfate  325 mg Oral Daily with breakfast    clopidogrel  75 mg Oral Daily    carvedilol  12.5 mg Oral BID    atorvastatin  20 mg Oral Daily    metFORMIN  1,000 mg Oral BID WC    sodium chloride flush  5-40 mL IntraVENous 2 times per day    acetaminophen  650 mg Oral Q6H    bisacodyl  5 mg Oral Daily    traMADol  50 mg Oral Q6H    aspirin  81 mg Oral BID    pantoprazole  40 mg Oral Daily    polyethylene glycol  17 g Oral BID       Data:     Code Status: Full Code    Family History   Problem Relation Age of Onset    Heart Disease Other     Lung Cancer Father     Coronary Art Dis Brother     Coronary Art Dis Brother     High Blood Pressure Neg Hx     Cancer Neg Hx     Diabetes Neg Hx      Social History     Socioeconomic History    Marital status:      Spouse name: Not on file    Number of children: Not on file    Years of education: Not on file    Highest education level: Not on file   Occupational History    Not on file   Tobacco Use    Smoking status: Former     Packs/day: 1.00     Years: 3.00     Pack years: 3.00     Types: Cigarettes     Start date: 66     Quit date:      Years since quittin.6    Smokeless tobacco: Never   Vaping Use    Vaping Use: Never used   Substance and Sexual

## 2023-08-25 ENCOUNTER — TELEPHONE (OUTPATIENT)
Dept: INTERNAL MEDICINE | Age: 78
End: 2023-08-25

## 2023-08-25 ENCOUNTER — TELEPHONE (OUTPATIENT)
Dept: INPATIENT UNIT | Age: 78
End: 2023-08-25

## 2023-08-25 NOTE — TELEPHONE ENCOUNTER
[address] [facility],[address] Transitions Initial Follow Up Call    Outreach made within 2 business days of discharge: Yes    Patient: Genevieve Felder   Patient : 1945 MRN: [id number]    Reason for Admission: There are no discharge diagnoses documented for the most recent discharge. Discharge Date: 23       Spoke with: Attempted to make contact with patient/caregiver for an initial transitions of care follow up call post discharge without success. I will reach out again at a later time. Any previously scheduled hospital follow up appointments noted below.         Discharge department/facility: [facility]    RECORDS IN CHART  PT    Scheduled appointment with PCP within 7-14 days    Follow Up  Future Appointments   Date Time Provider [address]   2023 10:00 AM Teetee Calvo MD [facility]   2023  9:30 AM Kathia Mota MD [facility] Cardio [facility]       [place], [place]

## 2023-08-28 ENCOUNTER — TELEPHONE (OUTPATIENT)
Dept: INTERNAL MEDICINE | Age: 78
End: 2023-08-28

## 2023-08-28 NOTE — TELEPHONE ENCOUNTER
[id number] [facility],[address] Floor Transitions Initial Follow Up Call    Outreach made within 2 business days of discharge: Yes    Patient: Patricia Wall   Patient : 1945 MRN: [id number]    Reason for Admission: Right knee djd    Discharge Date: 23      Discharge Diagnosis  right knee djd     Spoke with: Patient    Discharge department/facility: [address] [place] [address]    TCM Interactive Patient Contact:  Was patient able to fill all prescriptions: Yes  Was patient instructed to bring all medications to the follow-up visit: Yes  Is patient taking all medications as directed in the discharge summary? Yes  Does patient understand their discharge instructions: Yes  Does patient have questions or concerns that need addressed prior to 7-14 day follow up office visit: no    Spoke to the patient he is doing well. Pt describes his pain as a 5 or 6 he is using pain medication as needed mainly just using OTC pain medications. He does not have any problem with constipation. He does have home health. He will let us or them now if he needs any equipment. At this time he has declined to do a TCM apt.     RECORDS IN CHART  PT    Scheduled appointment with PCP within 7-14 days    Follow Up  Future Appointments   Date Time Provider [facility] [address]   2023 10:00 AM [facility]   2023  9:30 AM Adolfo Caban MD [facility] Cardio [facility]       Bev Rosen, [address] [facility]

## 2023-11-20 ENCOUNTER — TELEPHONE (OUTPATIENT)
Dept: CARDIOLOGY CLINIC | Age: 78
End: 2023-11-20

## 2023-11-27 ENCOUNTER — OFFICE VISIT (OUTPATIENT)
Dept: PRIMARY CARE CLINIC | Age: 78
End: 2023-11-27
Payer: OTHER GOVERNMENT

## 2023-11-27 VITALS
BODY MASS INDEX: 28.77 KG/M2 | HEIGHT: 70 IN | OXYGEN SATURATION: 98 % | DIASTOLIC BLOOD PRESSURE: 72 MMHG | WEIGHT: 201 LBS | HEART RATE: 94 BPM | SYSTOLIC BLOOD PRESSURE: 124 MMHG | TEMPERATURE: 97.5 F

## 2023-11-27 DIAGNOSIS — I25.810 CORONARY ARTERY DISEASE INVOLVING CORONARY BYPASS GRAFT OF NATIVE HEART WITHOUT ANGINA PECTORIS: ICD-10-CM

## 2023-11-27 DIAGNOSIS — I48.0 PAROXYSMAL ATRIAL FIBRILLATION WITH RAPID VENTRICULAR RESPONSE (HCC): ICD-10-CM

## 2023-11-27 DIAGNOSIS — I10 ESSENTIAL HYPERTENSION: ICD-10-CM

## 2023-11-27 DIAGNOSIS — D50.8 IRON DEFICIENCY ANEMIA SECONDARY TO INADEQUATE DIETARY IRON INTAKE: ICD-10-CM

## 2023-11-27 DIAGNOSIS — E11.65 TYPE 2 DIABETES MELLITUS WITH HYPERGLYCEMIA, WITHOUT LONG-TERM CURRENT USE OF INSULIN (HCC): Primary | ICD-10-CM

## 2023-11-27 DIAGNOSIS — I50.20 HFREF (HEART FAILURE WITH REDUCED EJECTION FRACTION) (HCC): ICD-10-CM

## 2023-11-27 DIAGNOSIS — F41.1 GAD (GENERALIZED ANXIETY DISORDER): ICD-10-CM

## 2023-11-27 DIAGNOSIS — R10.9 ABDOMINAL CRAMPING: ICD-10-CM

## 2023-11-27 DIAGNOSIS — E87.1 HYPONATREMIA: ICD-10-CM

## 2023-11-27 DIAGNOSIS — Z79.899 MEDICATION MANAGEMENT: ICD-10-CM

## 2023-11-27 PROCEDURE — 3074F SYST BP LT 130 MM HG: CPT | Performed by: FAMILY MEDICINE

## 2023-11-27 PROCEDURE — 3078F DIAST BP <80 MM HG: CPT | Performed by: FAMILY MEDICINE

## 2023-11-27 PROCEDURE — 1123F ACP DISCUSS/DSCN MKR DOCD: CPT | Performed by: FAMILY MEDICINE

## 2023-11-27 PROCEDURE — 99214 OFFICE O/P EST MOD 30 MIN: CPT | Performed by: FAMILY MEDICINE

## 2023-11-27 PROCEDURE — 3044F HG A1C LEVEL LT 7.0%: CPT | Performed by: FAMILY MEDICINE

## 2023-11-27 RX ORDER — TIZANIDINE HYDROCHLORIDE 2 MG/1
CAPSULE, GELATIN COATED ORAL
COMMUNITY
Start: 2023-09-05

## 2023-11-27 RX ORDER — ALPRAZOLAM 1 MG/1
TABLET ORAL
Qty: 60 TABLET | Refills: 2 | Status: SHIPPED | OUTPATIENT
Start: 2023-11-27 | End: 2024-02-27

## 2023-11-27 RX ORDER — METOCLOPRAMIDE 10 MG/1
10 TABLET ORAL
Qty: 120 TABLET | Refills: 3 | Status: SHIPPED | OUTPATIENT
Start: 2023-11-27

## 2023-11-27 SDOH — ECONOMIC STABILITY: HOUSING INSECURITY
IN THE LAST 12 MONTHS, WAS THERE A TIME WHEN YOU DID NOT HAVE A STEADY PLACE TO SLEEP OR SLEPT IN A SHELTER (INCLUDING NOW)?: NO

## 2023-11-27 SDOH — ECONOMIC STABILITY: FOOD INSECURITY: WITHIN THE PAST 12 MONTHS, YOU WORRIED THAT YOUR FOOD WOULD RUN OUT BEFORE YOU GOT MONEY TO BUY MORE.: NEVER TRUE

## 2023-11-27 SDOH — ECONOMIC STABILITY: INCOME INSECURITY: HOW HARD IS IT FOR YOU TO PAY FOR THE VERY BASICS LIKE FOOD, HOUSING, MEDICAL CARE, AND HEATING?: NOT HARD AT ALL

## 2023-11-27 SDOH — ECONOMIC STABILITY: FOOD INSECURITY: WITHIN THE PAST 12 MONTHS, THE FOOD YOU BOUGHT JUST DIDN'T LAST AND YOU DIDN'T HAVE MONEY TO GET MORE.: NEVER TRUE

## 2023-11-27 ASSESSMENT — PATIENT HEALTH QUESTIONNAIRE - PHQ9
2. FEELING DOWN, DEPRESSED OR HOPELESS: 0
SUM OF ALL RESPONSES TO PHQ QUESTIONS 1-9: 0
SUM OF ALL RESPONSES TO PHQ QUESTIONS 1-9: 0
SUM OF ALL RESPONSES TO PHQ9 QUESTIONS 1 & 2: 0
SUM OF ALL RESPONSES TO PHQ QUESTIONS 1-9: 2
SUM OF ALL RESPONSES TO PHQ QUESTIONS 1-9: 2
SUM OF ALL RESPONSES TO PHQ QUESTIONS 1-9: 0
SUM OF ALL RESPONSES TO PHQ QUESTIONS 1-9: 0
1. LITTLE INTEREST OR PLEASURE IN DOING THINGS: 0
2. FEELING DOWN, DEPRESSED OR HOPELESS: 2
SUM OF ALL RESPONSES TO PHQ QUESTIONS 1-9: 2
SUM OF ALL RESPONSES TO PHQ9 QUESTIONS 1 & 2: 2
1. LITTLE INTEREST OR PLEASURE IN DOING THINGS: 0
SUM OF ALL RESPONSES TO PHQ QUESTIONS 1-9: 2

## 2023-11-29 ENCOUNTER — OFFICE VISIT (OUTPATIENT)
Dept: CARDIOLOGY CLINIC | Age: 78
End: 2023-11-29
Payer: OTHER GOVERNMENT

## 2023-11-29 VITALS
SYSTOLIC BLOOD PRESSURE: 128 MMHG | OXYGEN SATURATION: 97 % | HEART RATE: 79 BPM | WEIGHT: 198 LBS | BODY MASS INDEX: 28.35 KG/M2 | DIASTOLIC BLOOD PRESSURE: 74 MMHG | HEIGHT: 70 IN

## 2023-11-29 DIAGNOSIS — I48.0 PAF (PAROXYSMAL ATRIAL FIBRILLATION) (HCC): Primary | ICD-10-CM

## 2023-11-29 PROCEDURE — 3074F SYST BP LT 130 MM HG: CPT | Performed by: INTERNAL MEDICINE

## 2023-11-29 PROCEDURE — 3078F DIAST BP <80 MM HG: CPT | Performed by: INTERNAL MEDICINE

## 2023-11-29 PROCEDURE — 99214 OFFICE O/P EST MOD 30 MIN: CPT | Performed by: INTERNAL MEDICINE

## 2023-11-29 PROCEDURE — 1123F ACP DISCUSS/DSCN MKR DOCD: CPT | Performed by: INTERNAL MEDICINE

## 2023-11-29 NOTE — PROGRESS NOTES
Grecia Bonilla is a 69 y.o. male presents with coronary disease status post coronary artery bypass grafting with mitral valve valve repair and Torrez-Maze procedure. This was all done back in 2014. He developed complete heart block and had a pacemaker placed as well. All of his cardiovascular issues seem stable. He denies any chest pain or shortness of breath. He is unaware of the atrial fibrillation. He did have a left atrial appendage ligation in the past.  The patient's only concern today is arthritis and whether he can take Celebrex. Review of Systems   Constitutional: Negative for fever, chills, diaphoresis, activity change, appetite change, fatigue and unexpected weight change. Eyes: Negative for photophobia, pain, redness and visual disturbance. Respiratory: Negative for apnea, cough, chest tightness, shortness of breath, wheezing and stridor. Cardiovascular: Negative for chest pain, palpitations and leg swelling. Gastrointestinal: Negative for abdominal distention. Genitourinary: Negative for dysuria, urgency and frequency. Musculoskeletal: Negative for myalgias, arthralgias and gait problem. Skin: Negative for color change, pallor, rash and wound. Neurological: Negative for dizziness, tremors, speech difficulty, weakness and numbness. Hematological: Does not bruise/bleed easily. Psychiatric/Behavioral: Negative.         Past Medical History:   Diagnosis Date    Arthritis     Atrial fibrillation ([address]) 03/19/2014    s/p cardioversion; sees dr. Jeffry Elizabeth    Bacteremia due to Gram-positive bacteria 05/19/2022    CAD (coronary artery disease)     sees [facility] cardiology    CHB (complete heart block) (HCC)     Diabetes mellitus ([address])     GERD (gastroesophageal reflux disease)     Hyperlipidemia     Hypertension     Ischemic dilated cardiomyopathy (HCC)     Knee pain     Mitral regurgitation     Pacemaker 03/31/2014    medtronic       Past Surgical History:   Procedure Laterality Date

## 2023-12-10 ASSESSMENT — ENCOUNTER SYMPTOMS
ABDOMINAL PAIN: 0
DIARRHEA: 0
VOMITING: 0
SHORTNESS OF BREATH: 0
COUGH: 0
CONSTIPATION: 0
TROUBLE SWALLOWING: 0
NAUSEA: 0

## 2023-12-13 ENCOUNTER — TELEPHONE (OUTPATIENT)
Dept: CARDIOLOGY CLINIC | Age: 78
End: 2023-12-13

## 2023-12-13 NOTE — TELEPHONE ENCOUNTER
I left a message for the patient regarding their CareLink that is due to be sent in for their cardiac device. Advised the patient to send in today and to call the office back with any questions that they may have. no

## 2024-01-24 ENCOUNTER — TELEPHONE (OUTPATIENT)
Dept: CARDIOLOGY CLINIC | Age: 79
End: 2024-01-24

## 2024-01-24 NOTE — TELEPHONE ENCOUNTER
Called and was unable to leave message for patient reminding to send carelink remote IPG interrogation.

## 2024-01-28 DIAGNOSIS — E78.5 HYPERLIPIDEMIA, UNSPECIFIED HYPERLIPIDEMIA TYPE: ICD-10-CM

## 2024-01-29 RX ORDER — ATORVASTATIN CALCIUM 20 MG/1
TABLET, FILM COATED ORAL
Qty: 90 TABLET | Refills: 3 | Status: SHIPPED | OUTPATIENT
Start: 2024-01-29

## 2024-02-21 DIAGNOSIS — E11.65 TYPE 2 DIABETES MELLITUS WITH HYPERGLYCEMIA, WITHOUT LONG-TERM CURRENT USE OF INSULIN (HCC): ICD-10-CM

## 2024-02-21 NOTE — TELEPHONE ENCOUNTER
Aníbal Rizo called to request a refill on his medication.      Last office visit : 11/27/2023   Next office visit : 2/27/2024     Requested Prescriptions     Pending Prescriptions Disp Refills    metFORMIN (GLUCOPHAGE) 500 MG tablet [Pharmacy Med Name: METFORMIN  MG TABLET] 360 tablet      Sig: TAKE 2 TABLETS BY MOUTH 2 TIMES DAILY (WITH MEALS) INDICATIONS: DIABETES            Tabatha Chiu MA

## 2024-02-27 ENCOUNTER — TELEPHONE (OUTPATIENT)
Dept: CARDIOLOGY CLINIC | Age: 79
End: 2024-02-27

## 2024-02-27 ENCOUNTER — OFFICE VISIT (OUTPATIENT)
Dept: PRIMARY CARE CLINIC | Age: 79
End: 2024-02-27
Payer: OTHER GOVERNMENT

## 2024-02-27 VITALS
HEIGHT: 70 IN | SYSTOLIC BLOOD PRESSURE: 136 MMHG | HEART RATE: 98 BPM | WEIGHT: 207 LBS | BODY MASS INDEX: 29.63 KG/M2 | DIASTOLIC BLOOD PRESSURE: 84 MMHG | OXYGEN SATURATION: 97 %

## 2024-02-27 DIAGNOSIS — E11.65 TYPE 2 DIABETES MELLITUS WITH HYPERGLYCEMIA, WITHOUT LONG-TERM CURRENT USE OF INSULIN (HCC): ICD-10-CM

## 2024-02-27 DIAGNOSIS — I44.2 CHB (COMPLETE HEART BLOCK) (HCC): ICD-10-CM

## 2024-02-27 DIAGNOSIS — L98.9 SKIN LESION OF LEFT ARM: ICD-10-CM

## 2024-02-27 DIAGNOSIS — I10 ESSENTIAL HYPERTENSION: Primary | ICD-10-CM

## 2024-02-27 DIAGNOSIS — E78.2 MIXED HYPERLIPIDEMIA: ICD-10-CM

## 2024-02-27 DIAGNOSIS — F41.1 GAD (GENERALIZED ANXIETY DISORDER): ICD-10-CM

## 2024-02-27 DIAGNOSIS — I50.20 HFREF (HEART FAILURE WITH REDUCED EJECTION FRACTION) (HCC): ICD-10-CM

## 2024-02-27 DIAGNOSIS — I25.10 CORONARY ARTERY DISEASE INVOLVING NATIVE CORONARY ARTERY OF NATIVE HEART WITHOUT ANGINA PECTORIS: ICD-10-CM

## 2024-02-27 PROCEDURE — 1123F ACP DISCUSS/DSCN MKR DOCD: CPT | Performed by: FAMILY MEDICINE

## 2024-02-27 PROCEDURE — 3079F DIAST BP 80-89 MM HG: CPT | Performed by: FAMILY MEDICINE

## 2024-02-27 PROCEDURE — 3075F SYST BP GE 130 - 139MM HG: CPT | Performed by: FAMILY MEDICINE

## 2024-02-27 PROCEDURE — 99214 OFFICE O/P EST MOD 30 MIN: CPT | Performed by: FAMILY MEDICINE

## 2024-02-27 RX ORDER — MELOXICAM 15 MG/1
15 TABLET ORAL DAILY
Qty: 90 TABLET | Refills: 3 | Status: SHIPPED | OUTPATIENT
Start: 2024-02-27

## 2024-02-27 RX ORDER — TIZANIDINE HYDROCHLORIDE 2 MG/1
CAPSULE, GELATIN COATED ORAL
Qty: 270 CAPSULE | Refills: 2 | Status: SHIPPED | OUTPATIENT
Start: 2024-02-27

## 2024-02-27 RX ORDER — ALPRAZOLAM 1 MG/1
TABLET ORAL
Qty: 180 TABLET | Refills: 0 | Status: SHIPPED | OUTPATIENT
Start: 2024-02-27 | End: 2024-05-29

## 2024-02-27 ASSESSMENT — PATIENT HEALTH QUESTIONNAIRE - PHQ9
SUM OF ALL RESPONSES TO PHQ9 QUESTIONS 1 & 2: 0
SUM OF ALL RESPONSES TO PHQ QUESTIONS 1-9: 0
SUM OF ALL RESPONSES TO PHQ QUESTIONS 1-9: 0
2. FEELING DOWN, DEPRESSED OR HOPELESS: 0
SUM OF ALL RESPONSES TO PHQ QUESTIONS 1-9: 0
1. LITTLE INTEREST OR PLEASURE IN DOING THINGS: 0
SUM OF ALL RESPONSES TO PHQ QUESTIONS 1-9: 0

## 2024-02-27 NOTE — PROGRESS NOTES
Reason For Visit:   He is here for a follow up on HTN and DMT2.      Combined HPI and A/P:      Diagnosis Orders   1. Essential hypertension        2. KIARA (generalized anxiety disorder)  ALPRAZolam (XANAX) 1 MG tablet      3. Type 2 diabetes mellitus with hyperglycemia, without long-term current use of insulin ([facility])        4. HFrEF (heart failure with reduced ejection fraction) ([facility])        5. CHB (complete heart block) ([facility])        6. Coronary artery disease involving native coronary artery of native heart without angina pectoris        7. Mixed hyperlipidemia        8. Skin lesion of left arm  augmented betamethasone dipropionate (DIPROLENE AF) 0.05 % cream          1.) Hypertension:        - This is a chronic, controlled problem. He does not check his BP at home.The patient does follow a low salt diet. The patient denies headaches, blurry vision, dizziness, heart palpitations, or chest pain.        - The patient's BP is at goal.        - The patient currently takes Lisinopril 40 mg 1 tablet orally daily, Coreg 12.5 mg 1 tab orally twice daily. He reports he is compliant with these. I will continue these medications at the current dose.        - He did not have his labs checked after his last visit. He will have these drawn.     2.) Anxiety:       - This is a chronic problem. He feels anxious daily. He takes Xanax 1 mg 0.5 to 1 tab BID. He feels this helps to control his anxiety and panic attacks. He has no side effects from thsi.       - The patient continue to require the Xanax to control their symptoms. We will continue to monitor for opportunities to decrease the dose in open to be able to wean off of this in the future.        - The patient UDS, drug contract, controlled prescription monitoring is up to date and appropriate.      3.) Diabetes Mellitus Type 2:           - This is a chronic, controlled problem. The patient reports his glucose has been controlled. He does follow a diabetic diet. He denies

## 2024-02-27 NOTE — TELEPHONE ENCOUNTER
I left a message for the patient regarding their CareLink that is due to be sent in for their cardiac device. Advised the patient to send in today and to call the office back with any questions that they may have.

## 2024-03-04 RX ORDER — BETAMETHASONE DIPROPIONATE 0.5 MG/G
CREAM TOPICAL
Qty: 15 G | Refills: 0 | Status: SHIPPED | OUTPATIENT
Start: 2024-03-04 | End: 2024-04-03

## 2024-03-04 ASSESSMENT — ENCOUNTER SYMPTOMS
NAUSEA: 0
DIARRHEA: 0
COUGH: 0
TROUBLE SWALLOWING: 0
SHORTNESS OF BREATH: 0
ABDOMINAL PAIN: 0
VOMITING: 0
CONSTIPATION: 0

## 2024-04-05 ENCOUNTER — TELEPHONE (OUTPATIENT)
Dept: CARDIOLOGY CLINIC | Age: 79
End: 2024-04-05

## 2024-05-06 RX ORDER — CLOPIDOGREL BISULFATE 75 MG/1
TABLET ORAL
Qty: 90 TABLET | Refills: 3 | Status: SHIPPED | OUTPATIENT
Start: 2024-05-06

## 2024-05-06 RX ORDER — LISINOPRIL 40 MG/1
TABLET ORAL
Qty: 90 TABLET | Refills: 3 | Status: SHIPPED | OUTPATIENT
Start: 2024-05-06

## 2024-05-09 RX ORDER — CARVEDILOL 12.5 MG/1
TABLET ORAL
Qty: 180 TABLET | Refills: 3 | Status: SHIPPED | OUTPATIENT
Start: 2024-05-09

## 2024-05-29 ENCOUNTER — OFFICE VISIT (OUTPATIENT)
Dept: CARDIOLOGY CLINIC | Age: 79
End: 2024-05-29
Payer: OTHER GOVERNMENT

## 2024-05-29 VITALS
OXYGEN SATURATION: 98 % | HEIGHT: 70 IN | SYSTOLIC BLOOD PRESSURE: 148 MMHG | HEART RATE: 85 BPM | WEIGHT: 210 LBS | DIASTOLIC BLOOD PRESSURE: 92 MMHG | BODY MASS INDEX: 30.06 KG/M2

## 2024-05-29 DIAGNOSIS — I44.2 CHB (COMPLETE HEART BLOCK) (HCC): ICD-10-CM

## 2024-05-29 DIAGNOSIS — Z95.0 PACEMAKER: Primary | ICD-10-CM

## 2024-05-29 PROCEDURE — 3080F DIAST BP >= 90 MM HG: CPT | Performed by: INTERNAL MEDICINE

## 2024-05-29 PROCEDURE — 99213 OFFICE O/P EST LOW 20 MIN: CPT | Performed by: INTERNAL MEDICINE

## 2024-05-29 PROCEDURE — 3077F SYST BP >= 140 MM HG: CPT | Performed by: INTERNAL MEDICINE

## 2024-05-29 PROCEDURE — 93280 PM DEVICE PROGR EVAL DUAL: CPT | Performed by: INTERNAL MEDICINE

## 2024-05-29 PROCEDURE — 1123F ACP DISCUSS/DSCN MKR DOCD: CPT | Performed by: INTERNAL MEDICINE

## 2024-05-29 NOTE — PROGRESS NOTES
Pacemaker interrogated  Presenting rhythm:  AS VS, AP 18.9%,  0.4%  Battey voltage 26 months, 2.74 V  Lead status:  Lead impedance within range and stable  Sensing:  P waves 0.5-0.7 mV,  R waves 22.4-31.36 mV  Thresholds:  Atrial 0.5V @ 0.4ms, ventricular 0.75@ 0.4ms  Observations:  9 ventricular high rate episodes  No mode switch episodes  See scanned report for details  Reprogramming for sensitivity and threshold testing  Next [facility] appointment:  8/29/24    
INDICATIONS: DIABETES, Disp: 360 tablet, Rfl: 3    atorvastatin (LIPITOR) 20 MG tablet, TAKE 1 TABLET BY MOUTH EVERY DAY, Disp: 90 tablet, Rfl: 3    metoclopramide (REGLAN) 10 MG tablet, Take 1 tablet by mouth 3 times daily (with meals), Disp: 120 tablet, Rfl: 3    naloxone 4 MG/0.1ML LIQD nasal spray, 1 spray by Nasal route as needed for Opioid Reversal, Disp: 1 each, Rfl: 0    pantoprazole (PROTONIX) 40 MG tablet, TAKE 1 TABLET BY MOUTH EVERY DAY, Disp: 90 tablet, Rfl: 3    sildenafil (VIAGRA) 100 MG tablet, Take 1 tablet by mouth as needed for Erectile Dysfunction, Disp: 16 tablet, Rfl: 2    PE:  Vitals:    05/29/24 0950 05/29/24 0953   BP: (!) 152/90 (!) 148/92   Pulse: 85    SpO2: 98%    Weight: 95.3 kg (210 lb)    Height: 1.778 m (5' 10\")        Estimated body mass index is 30.13 kg/m² as calculated from the following:    Height as of this encounter: 1.778 m (5' 10\").    Weight as of this encounter: 95.3 kg (210 lb).    Constitutional: He is oriented to person, place, and time. He appears well-developed and well-nourished in no acute distress.  Neck:  Neck supple without JVD present. No trachea deviation present. No thyromegaly present.   Eyes:Conjunctivae and EOM are normal. Pupils equal and reactive to light.   ENT:Hearing appears normal.conjunctiva and lids are normal, ears and nose appear normal.  Cardiovascular: Normal rate, S1-S2 regular rhythm, normal heart sounds.  No murmur ascultated.  No gallop and no friction rub.  No carotid bruits.  No peripheral edema.    Pulmonary/Chest:  Lungs clear to auscultation bilaterally without evidence of respiratory distress.  He without wheezes. He without rales or ronchi.   Musculoskeletal: Normal range of motion.  Gait is normal  Head is normocephalic and atraumatic.  Skin: Skin is warm and dry without rash or pallor.   Psychiatric:He is alert and oriented to person, place, and time.  He has a normal mood and affect. His behavior is normal. Thought content normal.

## 2024-07-23 DIAGNOSIS — F41.1 GAD (GENERALIZED ANXIETY DISORDER): ICD-10-CM

## 2024-07-23 NOTE — TELEPHONE ENCOUNTER
Aníbal JALYN Kingsleys called to request a refill on his medication.      Last office visit : 2/27/2024   Next office visit : 8/29/2024     Last UDS:   Benzodiazepine Screen, Urine   Date Value Ref Range Status   11/27/2023 neg  Final     Buprenorphine Urine   Date Value Ref Range Status   11/27/2023 neg  Final     Cocaine Metabolite Screen, Urine   Date Value Ref Range Status   11/27/2023 neg  Final     Gabapentin Screen, Urine   Date Value Ref Range Status   11/27/2023 neg  Final     MDMA, Urine   Date Value Ref Range Status   11/27/2023 neg  Final     Oxycodone Screen, Ur   Date Value Ref Range Status   11/27/2023 neg  Final     Propoxyphene Screen, Urine   Date Value Ref Range Status   11/27/2023 neg  Final     THC Screen, Urine   Date Value Ref Range Status   11/27/2023 neg  Final     Tricyclic Antidepressants, Urine   Date Value Ref Range Status   11/27/2023 neg  Final       Last Selvin: DUE  Medication Contract: 11/23   Last Fill: 2/24    Requested Prescriptions     Pending Prescriptions Disp Refills    ALPRAZolam (XANAX) 1 MG tablet [Pharmacy Med Name: ALPRAZOLAM 1 MG TABLET] 60 tablet 1     Sig: TAKE 0.5 TO 1 TABLET BY MOUTH TWICE DAILY AS NEEDED FOR ANXIETY.         Please approve or refuse this medication.   Yenni Leon MA

## 2024-07-25 RX ORDER — ALPRAZOLAM 1 MG/1
TABLET ORAL
Qty: 60 TABLET | Refills: 1 | Status: SHIPPED | OUTPATIENT
Start: 2024-07-25 | End: 2024-09-25

## 2024-08-26 DIAGNOSIS — E11.65 TYPE 2 DIABETES MELLITUS WITH HYPERGLYCEMIA, WITHOUT LONG-TERM CURRENT USE OF INSULIN (HCC): ICD-10-CM

## 2024-08-26 LAB
ALBUMIN SERPL-MCNC: 4 G/DL (ref 3.5–5.2)
ALP SERPL-CCNC: 47 U/L (ref 40–129)
ALT SERPL-CCNC: 21 U/L (ref 5–41)
ANION GAP SERPL CALCULATED.3IONS-SCNC: 9 MMOL/L (ref 7–19)
AST SERPL-CCNC: 17 U/L (ref 5–40)
BASOPHILS # BLD: 0 K/UL (ref 0–0.2)
BASOPHILS NFR BLD: 0.4 % (ref 0–1)
BILIRUB DIRECT SERPL-MCNC: 0.2 MG/DL (ref 0–0.3)
BILIRUB INDIRECT SERPL-MCNC: 0.2 MG/DL (ref 0–1)
BILIRUB SERPL-MCNC: 0.4 MG/DL (ref 0.2–1.2)
BUN SERPL-MCNC: 14 MG/DL (ref 8–23)
CALCIUM SERPL-MCNC: 9.3 MG/DL (ref 8.8–10.2)
CHLORIDE SERPL-SCNC: 106 MMOL/L (ref 98–111)
CO2 SERPL-SCNC: 25 MMOL/L (ref 22–29)
CREAT SERPL-MCNC: 1 MG/DL (ref 0.7–1.2)
EOSINOPHIL # BLD: 0.4 K/UL (ref 0–0.6)
EOSINOPHIL NFR BLD: 4.6 % (ref 0–5)
ERYTHROCYTE [DISTWIDTH] IN BLOOD BY AUTOMATED COUNT: 13.9 % (ref 11.5–14.5)
GLUCOSE SERPL-MCNC: 136 MG/DL (ref 70–99)
HBA1C MFR BLD: 6.1 % (ref 4–5.6)
HCT VFR BLD AUTO: 38.3 % (ref 42–52)
HGB BLD-MCNC: 12.7 G/DL (ref 14–18)
IMM GRANULOCYTES # BLD: 0 K/UL
LYMPHOCYTES # BLD: 2 K/UL (ref 1.1–4.5)
LYMPHOCYTES NFR BLD: 25.1 % (ref 20–40)
MCH RBC QN AUTO: 32.6 PG (ref 27–31)
MCHC RBC AUTO-ENTMCNC: 33.2 G/DL (ref 33–37)
MCV RBC AUTO: 98.2 FL (ref 80–94)
MONOCYTES # BLD: 0.7 K/UL (ref 0–0.9)
MONOCYTES NFR BLD: 8.5 % (ref 0–10)
NEUTROPHILS # BLD: 4.8 K/UL (ref 1.5–7.5)
NEUTS SEG NFR BLD: 60.9 % (ref 50–65)
PLATELET # BLD AUTO: 192 K/UL (ref 130–400)
PMV BLD AUTO: 10.4 FL (ref 9.4–12.4)
POTASSIUM SERPL-SCNC: 4.6 MMOL/L (ref 3.5–5)
PROT SERPL-MCNC: 6.8 G/DL (ref 6.6–8.7)
RBC # BLD AUTO: 3.9 M/UL (ref 4.7–6.1)
SODIUM SERPL-SCNC: 140 MMOL/L (ref 136–145)
WBC # BLD AUTO: 7.9 K/UL (ref 4.8–10.8)

## 2024-08-29 ENCOUNTER — OFFICE VISIT (OUTPATIENT)
Dept: PRIMARY CARE CLINIC | Age: 79
End: 2024-08-29
Payer: OTHER GOVERNMENT

## 2024-08-29 VITALS
HEIGHT: 70 IN | DIASTOLIC BLOOD PRESSURE: 72 MMHG | HEART RATE: 102 BPM | WEIGHT: 214 LBS | BODY MASS INDEX: 30.64 KG/M2 | OXYGEN SATURATION: 94 % | SYSTOLIC BLOOD PRESSURE: 130 MMHG

## 2024-08-29 DIAGNOSIS — R10.9 ABDOMINAL CRAMPING: ICD-10-CM

## 2024-08-29 DIAGNOSIS — F41.0 GENERALIZED ANXIETY DISORDER WITH PANIC ATTACKS: ICD-10-CM

## 2024-08-29 DIAGNOSIS — D50.8 IRON DEFICIENCY ANEMIA SECONDARY TO INADEQUATE DIETARY IRON INTAKE: ICD-10-CM

## 2024-08-29 DIAGNOSIS — F41.1 GENERALIZED ANXIETY DISORDER WITH PANIC ATTACKS: ICD-10-CM

## 2024-08-29 DIAGNOSIS — I10 ESSENTIAL HYPERTENSION: ICD-10-CM

## 2024-08-29 DIAGNOSIS — E11.65 TYPE 2 DIABETES MELLITUS WITH HYPERGLYCEMIA, WITHOUT LONG-TERM CURRENT USE OF INSULIN (HCC): ICD-10-CM

## 2024-08-29 DIAGNOSIS — J30.2 SEASONAL ALLERGIES: Primary | ICD-10-CM

## 2024-08-29 PROCEDURE — 3044F HG A1C LEVEL LT 7.0%: CPT | Performed by: FAMILY MEDICINE

## 2024-08-29 PROCEDURE — 3078F DIAST BP <80 MM HG: CPT | Performed by: FAMILY MEDICINE

## 2024-08-29 PROCEDURE — 99214 OFFICE O/P EST MOD 30 MIN: CPT | Performed by: FAMILY MEDICINE

## 2024-08-29 PROCEDURE — 3075F SYST BP GE 130 - 139MM HG: CPT | Performed by: FAMILY MEDICINE

## 2024-08-29 PROCEDURE — 1123F ACP DISCUSS/DSCN MKR DOCD: CPT | Performed by: FAMILY MEDICINE

## 2024-08-29 RX ORDER — FERROUS SULFATE 325(65) MG
325 TABLET ORAL
Qty: 90 TABLET | Refills: 1 | Status: SHIPPED | OUTPATIENT
Start: 2024-08-29

## 2024-08-29 RX ORDER — FLUTICASONE PROPIONATE 50 MCG
1 SPRAY, SUSPENSION (ML) NASAL DAILY
Qty: 16 G | Refills: 2 | Status: SHIPPED | OUTPATIENT
Start: 2024-08-29

## 2024-08-29 RX ORDER — METOCLOPRAMIDE 10 MG/1
10 TABLET ORAL
Qty: 120 TABLET | Refills: 3 | Status: SHIPPED | OUTPATIENT
Start: 2024-08-29

## 2024-09-19 ASSESSMENT — ENCOUNTER SYMPTOMS
COUGH: 0
CONSTIPATION: 0
VOMITING: 0
NAUSEA: 0
TROUBLE SWALLOWING: 0
ABDOMINAL PAIN: 1
DIARRHEA: 0
SHORTNESS OF BREATH: 0

## 2024-10-14 ENCOUNTER — OFFICE VISIT (OUTPATIENT)
Dept: PRIMARY CARE CLINIC | Age: 79
End: 2024-10-14
Payer: OTHER GOVERNMENT

## 2024-10-14 VITALS
DIASTOLIC BLOOD PRESSURE: 72 MMHG | HEART RATE: 92 BPM | HEIGHT: 70 IN | SYSTOLIC BLOOD PRESSURE: 120 MMHG | BODY MASS INDEX: 29.78 KG/M2 | OXYGEN SATURATION: 98 % | WEIGHT: 208 LBS

## 2024-10-14 DIAGNOSIS — R55 SYNCOPE, UNSPECIFIED SYNCOPE TYPE: Primary | ICD-10-CM

## 2024-10-14 DIAGNOSIS — E11.65 TYPE 2 DIABETES MELLITUS WITH HYPERGLYCEMIA, WITHOUT LONG-TERM CURRENT USE OF INSULIN (HCC): ICD-10-CM

## 2024-10-14 DIAGNOSIS — M25.562 ACUTE PAIN OF LEFT KNEE: ICD-10-CM

## 2024-10-14 DIAGNOSIS — I50.22 CHRONIC SYSTOLIC CONGESTIVE HEART FAILURE (HCC): ICD-10-CM

## 2024-10-14 PROCEDURE — 3078F DIAST BP <80 MM HG: CPT | Performed by: FAMILY MEDICINE

## 2024-10-14 PROCEDURE — 3074F SYST BP LT 130 MM HG: CPT | Performed by: FAMILY MEDICINE

## 2024-10-14 PROCEDURE — 99214 OFFICE O/P EST MOD 30 MIN: CPT | Performed by: FAMILY MEDICINE

## 2024-10-14 PROCEDURE — 3044F HG A1C LEVEL LT 7.0%: CPT | Performed by: FAMILY MEDICINE

## 2024-10-14 PROCEDURE — 1123F ACP DISCUSS/DSCN MKR DOCD: CPT | Performed by: FAMILY MEDICINE

## 2024-10-14 NOTE — PROGRESS NOTES
regular rhythm.   Pulmonary:      Effort: Pulmonary effort is normal.      Breath sounds: Normal breath sounds. No wheezing.   Chest:      Chest wall: No tenderness.   Abdominal:      General: Abdomen is flat. Bowel sounds are normal.      Tenderness: There is no abdominal tenderness.   Skin:     General: Skin is warm and dry.   Neurological:      Mental Status: He is alert.   Psychiatric:         Mood and Affect: Mood normal.         Behavior: Behavior normal.         Thought Content: Thought content normal.           No results found for this visit on 10/14/24.          Arsen Stewart MD

## 2024-10-16 ENCOUNTER — HOSPITAL ENCOUNTER (EMERGENCY)
Age: 79
Discharge: HOME | End: 2024-10-16
Attending: EMERGENCY MEDICINE
Payer: OTHER GOVERNMENT

## 2024-10-16 ENCOUNTER — APPOINTMENT (OUTPATIENT)
Dept: CT IMAGING | Age: 79
End: 2024-10-16
Payer: OTHER GOVERNMENT

## 2024-10-16 VITALS
BODY MASS INDEX: 30.13 KG/M2 | OXYGEN SATURATION: 99 % | TEMPERATURE: 98.1 F | WEIGHT: 210 LBS | HEART RATE: 83 BPM | RESPIRATION RATE: 17 BRPM | SYSTOLIC BLOOD PRESSURE: 122 MMHG | DIASTOLIC BLOOD PRESSURE: 57 MMHG

## 2024-10-16 DIAGNOSIS — S01.01XA LACERATION OF SCALP, INITIAL ENCOUNTER: ICD-10-CM

## 2024-10-16 DIAGNOSIS — S09.90XA INJURY OF HEAD, INITIAL ENCOUNTER: Primary | ICD-10-CM

## 2024-10-16 DIAGNOSIS — R42 DIZZY SPELLS: ICD-10-CM

## 2024-10-16 LAB
ALBUMIN SERPL-MCNC: 4.2 G/DL (ref 3.5–5.2)
ALP SERPL-CCNC: 50 U/L (ref 40–129)
ALT SERPL-CCNC: 19 U/L (ref 5–41)
ANION GAP SERPL CALCULATED.3IONS-SCNC: 11 MMOL/L (ref 7–19)
AST SERPL-CCNC: 18 U/L (ref 5–40)
BASOPHILS # BLD: 0 K/UL (ref 0–0.2)
BASOPHILS NFR BLD: 0.6 % (ref 0–1)
BILIRUB SERPL-MCNC: 1 MG/DL (ref 0.2–1.2)
BUN SERPL-MCNC: 13 MG/DL (ref 8–23)
CALCIUM SERPL-MCNC: 9.3 MG/DL (ref 8.8–10.2)
CHLORIDE SERPL-SCNC: 102 MMOL/L (ref 98–111)
CO2 SERPL-SCNC: 22 MMOL/L (ref 22–29)
CREAT SERPL-MCNC: 0.8 MG/DL (ref 0.7–1.2)
EOSINOPHIL # BLD: 0.2 K/UL (ref 0–0.6)
EOSINOPHIL NFR BLD: 2.6 % (ref 0–5)
ERYTHROCYTE [DISTWIDTH] IN BLOOD BY AUTOMATED COUNT: 12.7 % (ref 11.5–14.5)
GLUCOSE SERPL-MCNC: 124 MG/DL (ref 70–99)
HCT VFR BLD AUTO: 41.8 % (ref 42–52)
HGB BLD-MCNC: 13.8 G/DL (ref 14–18)
IMM GRANULOCYTES # BLD: 0 K/UL
INR PPP: 1.19 (ref 0.88–1.18)
LYMPHOCYTES # BLD: 1.8 K/UL (ref 1.1–4.5)
LYMPHOCYTES NFR BLD: 25.1 % (ref 20–40)
MAGNESIUM SERPL-MCNC: 2 MG/DL (ref 1.6–2.4)
MCH RBC QN AUTO: 31.7 PG (ref 27–31)
MCHC RBC AUTO-ENTMCNC: 33 G/DL (ref 33–37)
MCV RBC AUTO: 96.1 FL (ref 80–94)
MONOCYTES # BLD: 0.6 K/UL (ref 0–0.9)
MONOCYTES NFR BLD: 8.6 % (ref 0–10)
NEUTROPHILS # BLD: 4.4 K/UL (ref 1.5–7.5)
NEUTS SEG NFR BLD: 62.7 % (ref 50–65)
PLATELET # BLD AUTO: 228 K/UL (ref 130–400)
PMV BLD AUTO: 9.9 FL (ref 9.4–12.4)
POTASSIUM SERPL-SCNC: 4.6 MMOL/L (ref 3.5–5)
PROT SERPL-MCNC: 7.4 G/DL (ref 6.4–8.3)
PROTHROMBIN TIME: 14.8 SEC (ref 12–14.6)
RBC # BLD AUTO: 4.35 M/UL (ref 4.7–6.1)
SODIUM SERPL-SCNC: 135 MMOL/L (ref 136–145)
TROPONIN, HIGH SENSITIVITY: 19 NG/L (ref 0–22)
WBC # BLD AUTO: 7 K/UL (ref 4.8–10.8)

## 2024-10-16 PROCEDURE — 93005 ELECTROCARDIOGRAM TRACING: CPT | Performed by: EMERGENCY MEDICINE

## 2024-10-16 PROCEDURE — 80053 COMPREHEN METABOLIC PANEL: CPT

## 2024-10-16 PROCEDURE — 70498 CT ANGIOGRAPHY NECK: CPT

## 2024-10-16 PROCEDURE — 6360000004 HC RX CONTRAST MEDICATION: Performed by: EMERGENCY MEDICINE

## 2024-10-16 PROCEDURE — 83735 ASSAY OF MAGNESIUM: CPT

## 2024-10-16 PROCEDURE — 36415 COLL VENOUS BLD VENIPUNCTURE: CPT

## 2024-10-16 PROCEDURE — 84484 ASSAY OF TROPONIN QUANT: CPT

## 2024-10-16 PROCEDURE — 99285 EMERGENCY DEPT VISIT HI MDM: CPT

## 2024-10-16 PROCEDURE — 85025 COMPLETE CBC W/AUTO DIFF WBC: CPT

## 2024-10-16 PROCEDURE — 70450 CT HEAD/BRAIN W/O DYE: CPT

## 2024-10-16 PROCEDURE — 85610 PROTHROMBIN TIME: CPT

## 2024-10-16 RX ORDER — IOPAMIDOL 755 MG/ML
70 INJECTION, SOLUTION INTRAVASCULAR
Status: COMPLETED | OUTPATIENT
Start: 2024-10-16 | End: 2024-10-16

## 2024-10-16 RX ADMIN — IOPAMIDOL 70 ML: 755 INJECTION, SOLUTION INTRAVENOUS at 11:44

## 2024-10-16 NOTE — ED PROVIDER NOTES
time.              NursingNotes were reviewed.    REVIEW OF SYSTEMS    (2-9 systems for level 4, 10 or more for level 5)     As per HPI         PAST MEDICALHISTORY     Past Medical History:   Diagnosis Date    Arthritis     Atrial fibrillation (HCC) 03/19/2014    s/p cardioversion; sees dr. martinez    Bacteremia due to Gram-positive bacteria 05/19/2022    CAD (coronary artery disease)     sees [facility] cardiology    CHB (complete heart block) (HCC)     Diabetes mellitus (HCC)     GERD (gastroesophageal reflux disease)     Hyperlipidemia     Hypertension     Ischemic dilated cardiomyopathy (HCC)     Knee pain     Mitral regurgitation     Pacemaker 03/31/2014    medtronic         SURGICAL HISTORY       Past Surgical History:   Procedure Laterality Date    CARDIAC CATHETERIZATION  3/19/14  [facility]    EF15-20%    CARDIAC VALVE REPLACEMENT  03/24/2014    Combined Mitral Valve Repair w/ 30 mm Bryant Annuloplasty Ring, CABG X 1 - SVG-OM, Torrez-MAZE Radiofrequency Ablation (JPO)    CARDIOVERSION  3/19/14  [facility]    COLONOSCOPY      CORONARY ARTERY BYPASS GRAFT  03/24/2014    Combined Mitral Valve Repair w/ #30mm Bryant Annuloplasty Ring, CABG X 1 - SVG-OM, Torrez-MAZE Radiofrequency Ablation (JPO)    PACEMAKER INSERTION  3/31/14  [facility]    medtronic    TOTAL KNEE ARTHROPLASTY Right 8/21/2023    ROBOTIC RIGHT KNEE TOTAL ARTHROPLASTY performed by Alvaro Madera MD at [facility] OR         CURRENT MEDICATIONS     Previous Medications    ATORVASTATIN (LIPITOR) 20 MG TABLET    TAKE 1 TABLET BY MOUTH EVERY DAY    CARVEDILOL (COREG) 12.5 MG TABLET    TAKE 1 TABLET BY MOUTH TWICE A DAY    CLOPIDOGREL (PLAVIX) 75 MG TABLET    TAKE 1 TABLET BY MOUTH EVERY DAY    ELASTIC BANDAGES & SUPPORTS (KNEE BRACE) MISC    1 each by Does not apply route daily    FERROUS SULFATE (IRON 325) 325 (65 FE) MG TABLET    Take 1 tablet by mouth daily (with breakfast)    FLUTICASONE (FLONASE) 50 MCG/ACT NASAL SPRAY    1 spray by Each Nostril route daily    LISINOPRIL

## 2024-10-16 NOTE — DISCHARGE INSTRUCTIONS
The cut on your scalp will not be sutured or stapled because the injury occurred yesterday and closing the wound at this time could put you at risk for infection.  The wound will be allowed to heal on its own.  Make sure you keep the wound clean.  Apply over-the-counter antibiotic ointment to the wound 2-3 times daily for the next 5 days.  Watch for signs of wound infection such as redness around the wound, swelling, pus draining from the wound, severe pain, fever.  Return or see PCP if any of these symptoms develop.    Do not drive/operate machinery, climb ladders, swim or bathe in a tub alone or do anything that would be dangerous to yourself or others if you are dizzy

## 2024-10-17 LAB
EKG P AXIS: NORMAL DEGREES
EKG P-R INTERVAL: NORMAL MS
EKG Q-T INTERVAL: 408 MS
EKG QRS DURATION: 90 MS
EKG QTC CALCULATION (BAZETT): 408 MS
EKG T AXIS: 142 DEGREES

## 2024-10-17 PROCEDURE — 93010 ELECTROCARDIOGRAM REPORT: CPT | Performed by: INTERNAL MEDICINE

## 2024-10-25 ENCOUNTER — TELEPHONE (OUTPATIENT)
Dept: CARDIOLOGY CLINIC | Age: 79
End: 2024-10-25

## 2024-10-26 ASSESSMENT — ENCOUNTER SYMPTOMS
DIARRHEA: 0
SHORTNESS OF BREATH: 0
ABDOMINAL PAIN: 0
COUGH: 0
CONSTIPATION: 0
VOMITING: 0
TROUBLE SWALLOWING: 0
NAUSEA: 0

## 2024-11-20 ENCOUNTER — OFFICE VISIT (OUTPATIENT)
Age: 79
End: 2024-11-20
Payer: OTHER GOVERNMENT

## 2024-11-20 VITALS — WEIGHT: 204.4 LBS | HEIGHT: 70 IN | BODY MASS INDEX: 29.26 KG/M2

## 2024-11-20 DIAGNOSIS — M17.12 PRIMARY OSTEOARTHRITIS OF LEFT KNEE: Primary | ICD-10-CM

## 2024-11-20 DIAGNOSIS — G89.29 CHRONIC PAIN OF LEFT KNEE: ICD-10-CM

## 2024-11-20 DIAGNOSIS — M25.562 CHRONIC PAIN OF LEFT KNEE: ICD-10-CM

## 2024-11-20 DIAGNOSIS — M25.562 LEFT KNEE PAIN, UNSPECIFIED CHRONICITY: ICD-10-CM

## 2024-11-20 PROCEDURE — 99203 OFFICE O/P NEW LOW 30 MIN: CPT | Performed by: PHYSICIAN ASSISTANT

## 2024-11-20 PROCEDURE — 20610 DRAIN/INJ JOINT/BURSA W/O US: CPT | Performed by: PHYSICIAN ASSISTANT

## 2024-11-20 PROCEDURE — 1123F ACP DISCUSS/DSCN MKR DOCD: CPT | Performed by: PHYSICIAN ASSISTANT

## 2024-11-20 RX ORDER — LIDOCAINE HYDROCHLORIDE 10 MG/ML
2.5 INJECTION, SOLUTION INFILTRATION; PERINEURAL ONCE
Status: COMPLETED | OUTPATIENT
Start: 2024-11-20 | End: 2024-11-20

## 2024-11-20 RX ORDER — BUPIVACAINE HYDROCHLORIDE 5 MG/ML
30 INJECTION, SOLUTION EPIDURAL; INTRACAUDAL ONCE
Status: COMPLETED | OUTPATIENT
Start: 2024-11-20 | End: 2024-11-20

## 2024-11-20 RX ORDER — BETAMETHASONE SODIUM PHOSPHATE AND BETAMETHASONE ACETATE 3; 3 MG/ML; MG/ML
6 INJECTION, SUSPENSION INTRA-ARTICULAR; INTRALESIONAL; INTRAMUSCULAR; SOFT TISSUE ONCE
Status: COMPLETED | OUTPATIENT
Start: 2024-11-20 | End: 2024-11-20

## 2024-11-20 RX ADMIN — BETAMETHASONE SODIUM PHOSPHATE AND BETAMETHASONE ACETATE 6 MG: 3; 3 INJECTION, SUSPENSION INTRA-ARTICULAR; INTRALESIONAL; INTRAMUSCULAR; SOFT TISSUE at 12:29

## 2024-11-20 RX ADMIN — LIDOCAINE HYDROCHLORIDE 2.5 ML: 10 INJECTION, SOLUTION INFILTRATION; PERINEURAL at 12:30

## 2024-11-20 RX ADMIN — BUPIVACAINE HYDROCHLORIDE 150 MG: 5 INJECTION, SOLUTION EPIDURAL; INTRACAUDAL at 12:30

## 2024-11-21 NOTE — PROGRESS NOTES
in the Last Year: No     Number of Places Lived in the Last Year: Not on file     Unstable Housing in the Last Year: No       Family History:   Family History   Problem Relation Age of Onset    Heart Disease Other     Lung Cancer Father     Coronary Art Dis Brother     Coronary Art Dis Brother     High Blood Pressure Neg Hx     Cancer Neg Hx     Diabetes Neg Hx        Review of Systems -   System Neg/Pos Details   Constitutional negative Fatigue and Fever.   Respiratory negative   Cough and Dyspnea.   Cardio negative   Chest pain.   GI negative   Abdominal pain and Vomiting.    negative   Urinary incontinence.   Neuro negative   Headache.   Psych negative   Psychiatric symptoms.       PHYSICAL EXAM:    Ht 1.778 m (5' 10\")   Wt 92.7 kg (204 lb 6.4 oz)   BMI 29.33 kg/m²      GENERAL: No distress.  ORIENTATION: Alert and oriented to time, place, person.  MOOD AND AFFECT: Cooperative and pleasant.  GAIT EXAMINATION: Reciprocal heel-toe gait without assistive devices.  CARDIOVASCULAR: Symmetric 2 pulses in upper and lower extremity, no significant edema noted.  LYMPHATIC: No cervical or inguinal lymphadenopathy noted.  SENSATION: Grossly intact to light touch.  DEEP TENDON REFLEXES: Normal, no pathological reflexes.  COORDINATION/BALANCE: Normal.  Left Knee Exam     Tenderness   The patient is experiencing tenderness in the medial joint line.    Range of Motion   Extension:  5   Left knee flexion: 115.     Tests   Ann Marie:  Medial - negative Lateral - negative  Varus: negative Valgus: negative  Lachman:  Anterior - negative      Patellar apprehension: negative    Other   Erythema: absent  Scars: absent  Sensation: normal  Pulse: present  Swelling: mild  Effusion: no effusion present           Radiology:   No results found.     XR KNEE LEFT (MIN 4 VIEWS)    Result Date: 11/20/2024  AP and PA standing views, a left lateral view, and bilateral sunrise of the left knee was obtained in the office on today's visit,

## 2024-12-05 ENCOUNTER — TELEPHONE (OUTPATIENT)
Dept: CARDIOLOGY CLINIC | Age: 79
End: 2024-12-05

## 2024-12-06 ENCOUNTER — TELEPHONE (OUTPATIENT)
Dept: CARDIOLOGY CLINIC | Age: 79
End: 2024-12-06

## 2024-12-06 NOTE — TELEPHONE ENCOUNTER
Called and spoke to patients daughter.  Reminded patient is due to send in a carelink pacemaker reading.  She will let him know.

## 2024-12-20 ENCOUNTER — TELEPHONE (OUTPATIENT)
Dept: CARDIOLOGY CLINIC | Age: 79
End: 2024-12-20

## 2025-02-04 DIAGNOSIS — D50.8 IRON DEFICIENCY ANEMIA SECONDARY TO INADEQUATE DIETARY IRON INTAKE: ICD-10-CM

## 2025-02-05 DIAGNOSIS — E78.5 HYPERLIPIDEMIA, UNSPECIFIED HYPERLIPIDEMIA TYPE: ICD-10-CM

## 2025-02-05 RX ORDER — ATORVASTATIN CALCIUM 20 MG/1
TABLET, FILM COATED ORAL
Qty: 90 TABLET | Refills: 0 | Status: SHIPPED | OUTPATIENT
Start: 2025-02-05

## 2025-02-05 RX ORDER — FERROUS SULFATE 325(65) MG
1 TABLET ORAL
Qty: 90 TABLET | Refills: 1 | Status: SHIPPED | OUTPATIENT
Start: 2025-02-05

## 2025-02-23 ENCOUNTER — APPOINTMENT (OUTPATIENT)
Dept: CT IMAGING | Age: 80
DRG: 312 | End: 2025-02-23
Payer: MEDICARE

## 2025-02-23 ENCOUNTER — APPOINTMENT (OUTPATIENT)
Dept: GENERAL RADIOLOGY | Age: 80
DRG: 312 | End: 2025-02-23
Payer: MEDICARE

## 2025-02-23 ENCOUNTER — HOSPITAL ENCOUNTER (INPATIENT)
Age: 80
LOS: 2 days | Discharge: HOME HEALTH | DRG: 312 | End: 2025-02-25
Attending: STUDENT IN AN ORGANIZED HEALTH CARE EDUCATION/TRAINING PROGRAM | Admitting: INTERNAL MEDICINE
Payer: MEDICARE

## 2025-02-23 DIAGNOSIS — R26.89 SHUFFLING GAIT: ICD-10-CM

## 2025-02-23 DIAGNOSIS — R25.1 TREMOR: ICD-10-CM

## 2025-02-23 DIAGNOSIS — R42 DIZZINESS: Primary | ICD-10-CM

## 2025-02-23 DIAGNOSIS — R55 SYNCOPE AND COLLAPSE: ICD-10-CM

## 2025-02-23 LAB
ALBUMIN SERPL-MCNC: 4.2 G/DL (ref 3.5–5.2)
ALP SERPL-CCNC: 47 U/L (ref 40–129)
ALT SERPL-CCNC: 15 U/L (ref 5–41)
ANION GAP SERPL CALCULATED.3IONS-SCNC: 16 MMOL/L (ref 8–16)
AST SERPL-CCNC: 17 U/L (ref 5–40)
BASOPHILS # BLD: 0 K/UL (ref 0–0.2)
BASOPHILS NFR BLD: 0.4 % (ref 0–1)
BILIRUB SERPL-MCNC: 0.7 MG/DL (ref 0.2–1.2)
BUN SERPL-MCNC: 19 MG/DL (ref 8–23)
CALCIUM SERPL-MCNC: 9.7 MG/DL (ref 8.8–10.2)
CHLORIDE SERPL-SCNC: 99 MMOL/L (ref 98–107)
CO2 SERPL-SCNC: 22 MMOL/L (ref 22–29)
CREAT SERPL-MCNC: 1 MG/DL (ref 0.7–1.2)
EOSINOPHIL # BLD: 0.2 K/UL (ref 0–0.6)
EOSINOPHIL NFR BLD: 2.1 % (ref 0–5)
ERYTHROCYTE [DISTWIDTH] IN BLOOD BY AUTOMATED COUNT: 13.2 % (ref 11.5–14.5)
ETHANOLAMINE SERPL-MCNC: <10 MG/DL (ref 0–10)
GLUCOSE SERPL-MCNC: 141 MG/DL (ref 70–99)
HCT VFR BLD AUTO: 39 % (ref 42–52)
HGB BLD-MCNC: 13.6 G/DL (ref 14–18)
IMM GRANULOCYTES # BLD: 0 K/UL
LYMPHOCYTES # BLD: 1.8 K/UL (ref 1.1–4.5)
LYMPHOCYTES NFR BLD: 25.5 % (ref 20–40)
MAGNESIUM SERPL-MCNC: 1.7 MG/DL (ref 1.6–2.4)
MCH RBC QN AUTO: 33.3 PG (ref 27–31)
MCHC RBC AUTO-ENTMCNC: 34.9 G/DL (ref 33–37)
MCV RBC AUTO: 95.4 FL (ref 80–94)
MONOCYTES # BLD: 0.5 K/UL (ref 0–0.9)
MONOCYTES NFR BLD: 7.6 % (ref 0–10)
NEUTROPHILS # BLD: 4.5 K/UL (ref 1.5–7.5)
NEUTS SEG NFR BLD: 64 % (ref 50–65)
PLATELET # BLD AUTO: 237 K/UL (ref 130–400)
PMV BLD AUTO: 9.9 FL (ref 9.4–12.4)
POTASSIUM SERPL-SCNC: 4.3 MMOL/L (ref 3.5–5)
PROT SERPL-MCNC: 7.1 G/DL (ref 6.4–8.3)
RBC # BLD AUTO: 4.09 M/UL (ref 4.7–6.1)
SODIUM SERPL-SCNC: 137 MMOL/L (ref 136–145)
TROPONIN, HIGH SENSITIVITY: 16 NG/L (ref 0–22)
TROPONIN, HIGH SENSITIVITY: 20 NG/L (ref 0–22)
WBC # BLD AUTO: 7 K/UL (ref 4.8–10.8)

## 2025-02-23 PROCEDURE — 80053 COMPREHEN METABOLIC PANEL: CPT

## 2025-02-23 PROCEDURE — 99285 EMERGENCY DEPT VISIT HI MDM: CPT

## 2025-02-23 PROCEDURE — 85025 COMPLETE CBC W/AUTO DIFF WBC: CPT

## 2025-02-23 PROCEDURE — 36415 COLL VENOUS BLD VENIPUNCTURE: CPT

## 2025-02-23 PROCEDURE — 6360000002 HC RX W HCPCS: Performed by: INTERNAL MEDICINE

## 2025-02-23 PROCEDURE — 71045 X-RAY EXAM CHEST 1 VIEW: CPT

## 2025-02-23 PROCEDURE — 70450 CT HEAD/BRAIN W/O DYE: CPT

## 2025-02-23 PROCEDURE — 83735 ASSAY OF MAGNESIUM: CPT

## 2025-02-23 PROCEDURE — 6370000000 HC RX 637 (ALT 250 FOR IP): Performed by: INTERNAL MEDICINE

## 2025-02-23 PROCEDURE — 2580000003 HC RX 258: Performed by: INTERNAL MEDICINE

## 2025-02-23 PROCEDURE — 2580000003 HC RX 258: Performed by: STUDENT IN AN ORGANIZED HEALTH CARE EDUCATION/TRAINING PROGRAM

## 2025-02-23 PROCEDURE — 84484 ASSAY OF TROPONIN QUANT: CPT

## 2025-02-23 PROCEDURE — 82077 ASSAY SPEC XCP UR&BREATH IA: CPT

## 2025-02-23 PROCEDURE — 2060000000 HC ICU INTERMEDIATE R&B

## 2025-02-23 PROCEDURE — 93005 ELECTROCARDIOGRAM TRACING: CPT | Performed by: STUDENT IN AN ORGANIZED HEALTH CARE EDUCATION/TRAINING PROGRAM

## 2025-02-23 RX ORDER — 0.9 % SODIUM CHLORIDE 0.9 %
500 INTRAVENOUS SOLUTION INTRAVENOUS ONCE
Status: COMPLETED | OUTPATIENT
Start: 2025-02-23 | End: 2025-02-23

## 2025-02-23 RX ORDER — SODIUM CHLORIDE 9 MG/ML
INJECTION, SOLUTION INTRAVENOUS CONTINUOUS
Status: DISCONTINUED | OUTPATIENT
Start: 2025-02-23 | End: 2025-02-24

## 2025-02-23 RX ORDER — ONDANSETRON 2 MG/ML
4 INJECTION INTRAMUSCULAR; INTRAVENOUS EVERY 6 HOURS PRN
Status: DISCONTINUED | OUTPATIENT
Start: 2025-02-23 | End: 2025-02-25 | Stop reason: HOSPADM

## 2025-02-23 RX ORDER — ACETAMINOPHEN 650 MG/1
650 SUPPOSITORY RECTAL EVERY 6 HOURS PRN
Status: DISCONTINUED | OUTPATIENT
Start: 2025-02-23 | End: 2025-02-25 | Stop reason: HOSPADM

## 2025-02-23 RX ORDER — POTASSIUM CHLORIDE 1500 MG/1
40 TABLET, EXTENDED RELEASE ORAL PRN
Status: DISCONTINUED | OUTPATIENT
Start: 2025-02-23 | End: 2025-02-25 | Stop reason: HOSPADM

## 2025-02-23 RX ORDER — METOCLOPRAMIDE 5 MG/1
10 TABLET ORAL
Status: DISCONTINUED | OUTPATIENT
Start: 2025-02-23 | End: 2025-02-25 | Stop reason: HOSPADM

## 2025-02-23 RX ORDER — PANTOPRAZOLE SODIUM 40 MG/1
40 TABLET, DELAYED RELEASE ORAL DAILY
Status: DISCONTINUED | OUTPATIENT
Start: 2025-02-23 | End: 2025-02-25 | Stop reason: HOSPADM

## 2025-02-23 RX ORDER — SODIUM CHLORIDE 0.9 % (FLUSH) 0.9 %
10 SYRINGE (ML) INJECTION PRN
Status: DISCONTINUED | OUTPATIENT
Start: 2025-02-23 | End: 2025-02-25 | Stop reason: HOSPADM

## 2025-02-23 RX ORDER — CARVEDILOL 12.5 MG/1
12.5 TABLET ORAL 2 TIMES DAILY WITH MEALS
Status: DISCONTINUED | OUTPATIENT
Start: 2025-02-23 | End: 2025-02-24

## 2025-02-23 RX ORDER — ATORVASTATIN CALCIUM 20 MG/1
20 TABLET, FILM COATED ORAL DAILY
Status: DISCONTINUED | OUTPATIENT
Start: 2025-02-23 | End: 2025-02-25 | Stop reason: HOSPADM

## 2025-02-23 RX ORDER — MAGNESIUM SULFATE IN WATER 40 MG/ML
2000 INJECTION, SOLUTION INTRAVENOUS PRN
Status: DISCONTINUED | OUTPATIENT
Start: 2025-02-23 | End: 2025-02-25 | Stop reason: HOSPADM

## 2025-02-23 RX ORDER — CLOPIDOGREL BISULFATE 75 MG/1
75 TABLET ORAL DAILY
Status: DISCONTINUED | OUTPATIENT
Start: 2025-02-23 | End: 2025-02-25 | Stop reason: HOSPADM

## 2025-02-23 RX ORDER — SODIUM CHLORIDE 9 MG/ML
INJECTION, SOLUTION INTRAVENOUS PRN
Status: DISCONTINUED | OUTPATIENT
Start: 2025-02-23 | End: 2025-02-25 | Stop reason: HOSPADM

## 2025-02-23 RX ORDER — ENOXAPARIN SODIUM 100 MG/ML
40 INJECTION SUBCUTANEOUS DAILY
Status: DISCONTINUED | OUTPATIENT
Start: 2025-02-23 | End: 2025-02-25 | Stop reason: HOSPADM

## 2025-02-23 RX ORDER — ACETAMINOPHEN 325 MG/1
650 TABLET ORAL EVERY 6 HOURS PRN
Status: DISCONTINUED | OUTPATIENT
Start: 2025-02-23 | End: 2025-02-25 | Stop reason: HOSPADM

## 2025-02-23 RX ORDER — SODIUM CHLORIDE 0.9 % (FLUSH) 0.9 %
10 SYRINGE (ML) INJECTION EVERY 12 HOURS SCHEDULED
Status: DISCONTINUED | OUTPATIENT
Start: 2025-02-23 | End: 2025-02-25 | Stop reason: HOSPADM

## 2025-02-23 RX ORDER — POTASSIUM CHLORIDE 7.45 MG/ML
10 INJECTION INTRAVENOUS PRN
Status: DISCONTINUED | OUTPATIENT
Start: 2025-02-23 | End: 2025-02-25 | Stop reason: HOSPADM

## 2025-02-23 RX ORDER — ONDANSETRON 4 MG/1
4 TABLET, ORALLY DISINTEGRATING ORAL EVERY 8 HOURS PRN
Status: DISCONTINUED | OUTPATIENT
Start: 2025-02-23 | End: 2025-02-25 | Stop reason: HOSPADM

## 2025-02-23 RX ADMIN — ENOXAPARIN SODIUM 40 MG: 100 INJECTION SUBCUTANEOUS at 20:28

## 2025-02-23 RX ADMIN — SODIUM CHLORIDE: 9 INJECTION, SOLUTION INTRAVENOUS at 18:45

## 2025-02-23 RX ADMIN — METOCLOPRAMIDE 10 MG: 5 TABLET ORAL at 20:28

## 2025-02-23 RX ADMIN — SODIUM CHLORIDE 500 ML: 9 INJECTION, SOLUTION INTRAVENOUS at 15:38

## 2025-02-23 RX ADMIN — CARVEDILOL 12.5 MG: 12.5 TABLET, FILM COATED ORAL at 20:28

## 2025-02-23 ASSESSMENT — PAIN - FUNCTIONAL ASSESSMENT: PAIN_FUNCTIONAL_ASSESSMENT: NONE - DENIES PAIN

## 2025-02-23 NOTE — ED PROVIDER NOTES
[facility] 4 ONCOLOGY UNIT  eMERGENCY dEPARTMENT eNCOUnter      Pt Name: Aníbal Rizo  MRN: [id number]  Birthdate 1945  Date of evaluation: 2/23/2025  Provider: Bisi Bernard MD    CHIEF COMPLAINT       Chief Complaint   Patient presents with    Dizziness     Presents to er per ems with c/o syncopal episode         HISTORY OF PRESENT ILLNESS   (Location/Symptom, Timing/Onset,Context/Setting, Quality, Duration, Modifying Factors, Severity)  Note limiting factors.     HPI    Aníbal Rizo is a 79 y.o. male with PMH of complete heart block status post pacemaker, paroxysmal A-fib, hypertension, hyperlipidemia, CAD status post CABG and valve repair, nonischemic cardiomyopathy, type 2 diabetes who presents to the emergency department with CC of dizziness, syncopal episode.  Daughter at bedside helps provide the history.  States that the patient lives alone, she invited him to her house for lunch.  He drove over and picked up some Dr. Pepper on the way because they usually drink Dr. Pepper mixed with bourbon.  When he got out of his truck and went up her steps, she noticed that he seemed very weak, and she kept asking him if he was okay.  They sat down to eat lunch, and he ate a chili dog that she may have for him but seemed to eat very slowly and still seemed weak.  She continued to ask him if he was okay.  She states that he then started to look pale, staring forward with a blank look in his eyes, became diaphoretic, and then began to collapse while still in the chair.  Her  called 911, he did not think he felt a pulse so he started to do CPR but after just a couple chest compressions the patient woke up.    He denies any symptoms or concerns right now.  He reports that he has passed out before but cannot tell me why this happened or what the situation was.  He is a difficult historian.  His blood pressure is slightly soft upon arrival at 106/57.     Daughter reports that he has had several syncopal episodes  unremarkable, initial troponin negative.  Magnesium normal.  CT head is unremarkable for any acute abnormalities.  Patient given small fluid bolus with improvement in blood pressure. [JG]   1632 Added on ethanol since daughter said that he does drink some bourbon and she thought he seemed drunk when he arrived to her house today. [JG]   1633 Pacemaker interrogated, pending report. [JG]   1711 Delta troponin negative and downtrending, ethanol negative.  Still pending final report from pacemaker interrogation.  Overall patient with significant heart history with multiple episodes of syncope at home which occurred at rest today, pale, diaphoretic, and possibly hypotensive during this episode.  I think he would benefit from admission for further syncope workup.  Family is agreeable [JG]      ED Course User Index  [JG] Bisi Bernard MD                CONSULTS:  None    :  Unless otherwise noted below, none     Procedures    FINAL IMPRESSION      1. Dizziness    2. Syncope and collapse          DISPOSITION/PLAN   DISPOSITION Admitted 02/23/2025 05:39:07 PM               PATIENT REFERRED TO:  No follow-up provider specified.    DISCHARGE MEDICATIONS:  Current Discharge Medication List             (Please note that portions of this note were completed with a voice recognition program.  Efforts were made to edit thedictations but occasionally words are mis-transcribed.)    Bisi Bernard MD (electronically signed)Emergency Physician          Bisi Bernard MD  02/23/25 2050

## 2025-02-23 NOTE — ED NOTES
ED TO INPATIENT SBAR HANDOFF    Patient Name: Aníbal Rizo   : 1945  79 y.o.   Family/Caregiver Present: Yes  Code Status Order: Prior    C-SSRS: Risk of Suicide: No Risk  Sitter No  Restraints:         Situation  Chief Complaint:   Chief Complaint   Patient presents with    Dizziness     Presents to er per ems with c/o syncopal episode     Patient Diagnosis: No admission diagnoses are documented for this encounter.     Brief Description of Patient's Condition: syncope   Mental Status: oriented  Arrived from: home    Imaging:   CT HEAD WO CONTRAST   Final Result   Chronic stroke   Chronic atrophy   Chronic microvascular ischemia        All CT scans are performed using dose optimization techniques as appropriate to the performed exam and include    at least one of the following: Automated exposure control, adjustment of the mA and/or kV according to size, and the use of iterative reconstruction technique.        ______________________________________    Electronically signed by: GORAN BLAND M.D.   Date:     2025   Time:    16:19       XR CHEST PORTABLE   Final Result   1.  No acute disease               ______________________________________    Electronically signed by: KORY BARCENAS D.O.   Date:     2025   Time:    16:10         COVID-19 Results:   Internal Administration   First Dose COVID-19, MODERNA BLUE border, Primary or Immunocompromised, (age 12y+), IM, 100 mcg/0.5mL  2021   Second Dose COVID-19, MODERNA BLUE border, Primary or Immunocompromised, (age 12y+), IM, 100 mcg/0.5mL   2021       Last COVID Lab SARS-CoV-2, PCR (no units)   Date Value   2022 DETECTED (AA)           Abnormal labs:   Abnormal Labs Reviewed   CBC WITH AUTO DIFFERENTIAL - Abnormal; Notable for the following components:       Result Value    RBC 4.09 (*)     Hemoglobin 13.6 (*)     Hematocrit 39.0 (*)     MCV 95.4 (*)     MCH 33.3 (*)     All other components within normal limits    Sepsis Risk Score      Admitted with Sepsis? No    Recommendation  Incomplete orders:   Patient Belongings:   Additional Comments:   If any further questions, please call Sending RN at     Electronically signed by: Electronically signed by Bobo Marion RN on 2/23/2025 at 5:38 PM

## 2025-02-23 NOTE — H&P
Resp 18   Ht 1.803 m (5' 11\")   Wt 90.7 kg (200 lb)   SpO2 97%   BMI 27.89 kg/m²     Physical Exam  Vitals and nursing note reviewed.   HENT:      Head: Normocephalic and atraumatic.      Right Ear: External ear normal.      Left Ear: External ear normal.      Nose: Nose normal.      Mouth/Throat:      Mouth: Mucous membranes are moist.   Eyes:      Conjunctiva/sclera: Conjunctivae normal.      Pupils: Pupils are equal, round, and reactive to light.   Cardiovascular:      Rate and Rhythm: Normal rate and regular rhythm.      Heart sounds: Normal heart sounds.   Pulmonary:      Effort: Pulmonary effort is normal.      Breath sounds: Normal breath sounds.   Abdominal:      General: Abdomen is flat.      Palpations: Abdomen is soft.   Musculoskeletal:         General: No swelling.      Cervical back: Neck supple. No rigidity.   Skin:     General: Skin is warm and dry.   Neurological:      Mental Status: He is oriented to person, place, and time.   Psychiatric:         Mood and Affect: Mood normal.         Thought Content: Thought content normal.         CBC:   Recent Labs     02/23/25  1518   WBC 7.0   HGB 13.6*        BMP:    Recent Labs     02/23/25  1518      K 4.3   CL 99   CO2 22   BUN 19   CREATININE 1.0   GLUCOSE 141*     Hepatic:   Recent Labs     02/23/25  1518   AST 17   ALT 15   BILITOT 0.7   ALKPHOS 47     Troponin: No results for input(s): \"TROPONINI\" in the last 72 hours.  BNP: No results for input(s): \"BNP\" in the last 72 hours.  Lipids: No results for input(s): \"CHOL\", \"HDL\" in the last 72 hours.    Invalid input(s): \"LDLCALCU\"  ABGs:   Lab Results   Component Value Date/Time    PHART 7.280 05/17/2022 02:12 AM    PO2ART 107.0 05/17/2022 02:12 AM    GWA0ZQM 24.0 05/17/2022 02:12 AM     INR: No results for input(s): \"INR\" in the last 72 hours.  -----------------------------------------------------------------  CT HEAD WO CONTRAST    Result Date: 2/23/2025  EXAMINATION: HEAD CT WITHOUT  most concerning for potential transient hypotension.  Admit.  Check orthostatics.  Follow up on pacer read.  Repeat echo.  Hold lisinopril.  Telemetry monitoring.    Further recommendations to follow.      Bobo Verdin, DO

## 2025-02-24 ENCOUNTER — APPOINTMENT (OUTPATIENT)
Age: 80
DRG: 312 | End: 2025-02-24
Attending: INTERNAL MEDICINE
Payer: MEDICARE

## 2025-02-24 PROBLEM — Z51.5 PALLIATIVE CARE PATIENT: Status: ACTIVE | Noted: 2025-02-24

## 2025-02-24 LAB
ALBUMIN SERPL-MCNC: 3.6 G/DL (ref 3.5–5.2)
ALP SERPL-CCNC: 42 U/L (ref 40–129)
ALT SERPL-CCNC: 12 U/L (ref 5–41)
ANION GAP SERPL CALCULATED.3IONS-SCNC: 12 MMOL/L (ref 8–16)
AST SERPL-CCNC: 15 U/L (ref 5–40)
BASOPHILS # BLD: 0 K/UL (ref 0–0.2)
BASOPHILS NFR BLD: 0.4 % (ref 0–1)
BILIRUB SERPL-MCNC: 0.5 MG/DL (ref 0.2–1.2)
BUN SERPL-MCNC: 19 MG/DL (ref 8–23)
CALCIUM SERPL-MCNC: 9.1 MG/DL (ref 8.8–10.2)
CHLORIDE SERPL-SCNC: 105 MMOL/L (ref 98–107)
CO2 SERPL-SCNC: 23 MMOL/L (ref 22–29)
CREAT SERPL-MCNC: 0.9 MG/DL (ref 0.7–1.2)
ECHO AO ASC DIAM: 3.2 CM
ECHO AO ASCENDING AORTA INDEX: 1.52 CM/M2
ECHO AO ROOT DIAM: 2 CM
ECHO AO ROOT INDEX: 0.95 CM/M2
ECHO AO SINUS VALSALVA DIAM: 3 CM
ECHO AO SINUS VALSALVA INDEX: 1.42 CM/M2
ECHO AO ST JNCT DIAM: 3.2 CM
ECHO AR MAX VEL PISA: 3.4 M/S
ECHO AV AREA PEAK VELOCITY: 2.1 CM2
ECHO AV AREA VTI: 2.2 CM2
ECHO AV AREA/BSA PEAK VELOCITY: 1 CM2/M2
ECHO AV AREA/BSA VTI: 1 CM2/M2
ECHO AV MEAN GRADIENT: 5 MMHG
ECHO AV MEAN VELOCITY: 1.1 M/S
ECHO AV PEAK GRADIENT: 8 MMHG
ECHO AV PEAK VELOCITY: 1.4 M/S
ECHO AV REGURGITANT FRACTION: -1512 %
ECHO AV REGURGITANT PHT: 427 MS
ECHO AV REGURGITANT VOLUME: -1082.5 ML
ECHO AV VELOCITY RATIO: 0.71
ECHO AV VTI: 33 CM
ECHO BSA: 2.13 M2
ECHO EST RA PRESSURE: 3 MMHG
ECHO IVC PROX: 1.9 CM
ECHO LA AREA 2C: 19.2 CM2
ECHO LA AREA 4C: 20.3 CM2
ECHO LA DIAMETER INDEX: 1.9 CM/M2
ECHO LA DIAMETER: 4 CM
ECHO LA MAJOR AXIS: 5.2 CM
ECHO LA MINOR AXIS: 5.5 CM
ECHO LA TO AORTIC ROOT RATIO: 2
ECHO LA VOL BP: 59 ML (ref 18–58)
ECHO LA VOL MOD A2C: 54 ML (ref 18–58)
ECHO LA VOL MOD A4C: 62 ML (ref 18–58)
ECHO LA VOL/BSA BIPLANE: 28 ML/M2 (ref 16–34)
ECHO LA VOLUME INDEX MOD A2C: 26 ML/M2 (ref 16–34)
ECHO LA VOLUME INDEX MOD A4C: 29 ML/M2 (ref 16–34)
ECHO LV E' LATERAL VELOCITY: 7.4 CM/S
ECHO LV E' SEPTAL VELOCITY: 4.68 CM/S
ECHO LV EDV A2C: 226 ML
ECHO LV EDV A4C: 162 ML
ECHO LV EDV INDEX A4C: 77 ML/M2
ECHO LV EDV NDEX A2C: 107 ML/M2
ECHO LV EF PHYSICIAN: 45 %
ECHO LV EJECTION FRACTION A2C: 45 %
ECHO LV EJECTION FRACTION A4C: 45 %
ECHO LV EJECTION FRACTION BIPLANE: 45 % (ref 55–100)
ECHO LV ESV A2C: 124 ML
ECHO LV ESV A4C: 89 ML
ECHO LV ESV INDEX A2C: 59 ML/M2
ECHO LV ESV INDEX A4C: 42 ML/M2
ECHO LV FRACTIONAL SHORTENING: 29 % (ref 28–44)
ECHO LV INTERNAL DIMENSION DIASTOLE INDEX: 2.42 CM/M2
ECHO LV INTERNAL DIMENSION DIASTOLIC: 5.1 CM (ref 4.2–5.9)
ECHO LV INTERNAL DIMENSION SYSTOLIC INDEX: 1.71 CM/M2
ECHO LV INTERNAL DIMENSION SYSTOLIC: 3.6 CM
ECHO LV ISOVOLUMETRIC RELAXATION TIME (IVRT): 67 MS
ECHO LV IVSD: 1.2 CM (ref 0.6–1)
ECHO LV MASS 2D: 200.8 G (ref 88–224)
ECHO LV MASS INDEX 2D: 95.2 G/M2 (ref 49–115)
ECHO LV POSTERIOR WALL DIASTOLIC: 0.9 CM (ref 0.6–1)
ECHO LV RELATIVE WALL THICKNESS RATIO: 0.35
ECHO LVOT AREA: 3.1 CM2
ECHO LVOT AV VTI INDEX: 0.69
ECHO LVOT DIAM: 2 CM
ECHO LVOT MEAN GRADIENT: 2 MMHG
ECHO LVOT PEAK GRADIENT: 4 MMHG
ECHO LVOT PEAK VELOCITY: 1 M/S
ECHO LVOT STROKE VOLUME INDEX: 33.9 ML/M2
ECHO LVOT SV: 71.6 ML
ECHO LVOT VTI: 22.8 CM
ECHO MV A VELOCITY: 0.75 M/S
ECHO MV ANNULUS DIAMETER: 3.3 CM
ECHO MV AREA VTI: 1.4 CM2
ECHO MV E DECELERATION TIME (DT): 218 MS
ECHO MV E VELOCITY: 1.74 M/S
ECHO MV E/A RATIO: 2.32
ECHO MV E/E' LATERAL: 23.51
ECHO MV E/E' RATIO (AVERAGED): 30.35
ECHO MV E/E' SEPTAL: 37.18
ECHO MV LVOT VTI INDEX: 2.2
ECHO MV MAX VELOCITY: 1.8 M/S
ECHO MV MEAN GRADIENT: 5 MMHG
ECHO MV MEAN VELOCITY: 0.9 M/S
ECHO MV PEAK GRADIENT: 12 MMHG
ECHO MV REGURGITANT ALIASING (NYQUIST) VELOCITY: 26 CM/S
ECHO MV REGURGITANT FRACTION CONT EQ: 94 %
ECHO MV REGURGITANT PEAK GRADIENT: 92 MMHG
ECHO MV REGURGITANT PEAK VELOCITY: 4.8 M/S
ECHO MV REGURGITANT RADIUS PISA: 0.5 CM
ECHO MV REGURGITANT VOLUME: 1082.48 CM3
ECHO MV REGURGITANT VTIA: 135 CM
ECHO MV VTI: 50.1 CM
ECHO RA AREA 4C: 14.3 CM2
ECHO RA END SYSTOLIC VOLUME APICAL 4 CHAMBER INDEX BSA: 16 ML/M2
ECHO RA MAJOR AXIS INDEX: 2.13 CM/M2
ECHO RA MAJOR AXIS: 4.5 CM
ECHO RA MINOR AXIS INDEX: 1.66 CM/M2
ECHO RA MINOR AXIS: 3.5 CM
ECHO RA VOLUME: 34 ML
ECHO RIGHT VENTRICULAR SYSTOLIC PRESSURE (RVSP): 27 MMHG
ECHO RV BASAL DIMENSION: 3.1 CM
ECHO RV INTERNAL DIMENSION: 3.7 CM
ECHO RV LONGITUDINAL DIMENSION: 10.8 CM
ECHO RV MID DIMENSION: 3.6 CM
ECHO RV TAPSE: 2 CM (ref 1.7–?)
ECHO TV REGURGITANT MAX VELOCITY: 2.44 M/S
ECHO TV REGURGITANT PEAK GRADIENT: 24 MMHG
EOSINOPHIL # BLD: 0.2 K/UL (ref 0–0.6)
EOSINOPHIL NFR BLD: 2.6 % (ref 0–5)
ERYTHROCYTE [DISTWIDTH] IN BLOOD BY AUTOMATED COUNT: 13.2 % (ref 11.5–14.5)
GLUCOSE BLD-MCNC: 107 MG/DL (ref 70–99)
GLUCOSE BLD-MCNC: 308 MG/DL (ref 70–99)
GLUCOSE SERPL-MCNC: 139 MG/DL (ref 70–99)
HCT VFR BLD AUTO: 37.3 % (ref 42–52)
HGB BLD-MCNC: 12.3 G/DL (ref 14–18)
IMM GRANULOCYTES # BLD: 0 K/UL
LYMPHOCYTES # BLD: 2.1 K/UL (ref 1.1–4.5)
LYMPHOCYTES NFR BLD: 28.4 % (ref 20–40)
MCH RBC QN AUTO: 32.4 PG (ref 27–31)
MCHC RBC AUTO-ENTMCNC: 33 G/DL (ref 33–37)
MCV RBC AUTO: 98.2 FL (ref 80–94)
MONOCYTES # BLD: 0.7 K/UL (ref 0–0.9)
MONOCYTES NFR BLD: 9.3 % (ref 0–10)
NEUTROPHILS # BLD: 4.3 K/UL (ref 1.5–7.5)
NEUTS SEG NFR BLD: 59 % (ref 50–65)
PERFORMED ON: ABNORMAL
PERFORMED ON: ABNORMAL
PHOSPHATE SERPL-MCNC: 2.9 MG/DL (ref 2.5–4.5)
PLATELET # BLD AUTO: 202 K/UL (ref 130–400)
PMV BLD AUTO: 10.3 FL (ref 9.4–12.4)
POTASSIUM SERPL-SCNC: 4.1 MMOL/L (ref 3.5–5)
PROT SERPL-MCNC: 6.1 G/DL (ref 6.4–8.3)
RBC # BLD AUTO: 3.8 M/UL (ref 4.7–6.1)
SODIUM SERPL-SCNC: 140 MMOL/L (ref 136–145)
TSH SERPL DL<=0.005 MIU/L-ACNC: 2.37 UIU/ML (ref 0.27–4.2)
WBC # BLD AUTO: 7.2 K/UL (ref 4.8–10.8)

## 2025-02-24 PROCEDURE — 85025 COMPLETE CBC W/AUTO DIFF WBC: CPT

## 2025-02-24 PROCEDURE — 6360000004 HC RX CONTRAST MEDICATION: Performed by: INTERNAL MEDICINE

## 2025-02-24 PROCEDURE — 2060000000 HC ICU INTERMEDIATE R&B

## 2025-02-24 PROCEDURE — 6370000000 HC RX 637 (ALT 250 FOR IP): Performed by: INTERNAL MEDICINE

## 2025-02-24 PROCEDURE — 82962 GLUCOSE BLOOD TEST: CPT

## 2025-02-24 PROCEDURE — C8929 TTE W OR WO FOL WCON,DOPPLER: HCPCS

## 2025-02-24 PROCEDURE — 84443 ASSAY THYROID STIM HORMONE: CPT

## 2025-02-24 PROCEDURE — 80053 COMPREHEN METABOLIC PANEL: CPT

## 2025-02-24 PROCEDURE — 6360000002 HC RX W HCPCS: Performed by: INTERNAL MEDICINE

## 2025-02-24 PROCEDURE — 93306 TTE W/DOPPLER COMPLETE: CPT | Performed by: INTERNAL MEDICINE

## 2025-02-24 PROCEDURE — 36415 COLL VENOUS BLD VENIPUNCTURE: CPT

## 2025-02-24 PROCEDURE — 99222 1ST HOSP IP/OBS MODERATE 55: CPT | Performed by: INTERNAL MEDICINE

## 2025-02-24 PROCEDURE — 94760 N-INVAS EAR/PLS OXIMETRY 1: CPT

## 2025-02-24 PROCEDURE — 84100 ASSAY OF PHOSPHORUS: CPT

## 2025-02-24 RX ORDER — CARVEDILOL 12.5 MG/1
12.5 TABLET ORAL 2 TIMES DAILY WITH MEALS
Status: DISCONTINUED | OUTPATIENT
Start: 2025-02-24 | End: 2025-02-25 | Stop reason: HOSPADM

## 2025-02-24 RX ORDER — DEXTROSE MONOHYDRATE 100 MG/ML
INJECTION, SOLUTION INTRAVENOUS CONTINUOUS PRN
Status: DISCONTINUED | OUTPATIENT
Start: 2025-02-24 | End: 2025-02-25 | Stop reason: HOSPADM

## 2025-02-24 RX ORDER — CARVEDILOL 6.25 MG/1
6.25 TABLET ORAL 2 TIMES DAILY WITH MEALS
Status: DISCONTINUED | OUTPATIENT
Start: 2025-02-24 | End: 2025-02-24

## 2025-02-24 RX ORDER — INSULIN LISPRO 100 [IU]/ML
0-4 INJECTION, SOLUTION INTRAVENOUS; SUBCUTANEOUS
Status: DISCONTINUED | OUTPATIENT
Start: 2025-02-24 | End: 2025-02-25 | Stop reason: HOSPADM

## 2025-02-24 RX ORDER — MIDODRINE HYDROCHLORIDE 5 MG/1
2.5 TABLET ORAL
Status: DISCONTINUED | OUTPATIENT
Start: 2025-02-24 | End: 2025-02-25 | Stop reason: HOSPADM

## 2025-02-24 RX ORDER — GLUCAGON 1 MG/ML
1 KIT INJECTION PRN
Status: DISCONTINUED | OUTPATIENT
Start: 2025-02-24 | End: 2025-02-25 | Stop reason: HOSPADM

## 2025-02-24 RX ADMIN — METOCLOPRAMIDE 10 MG: 5 TABLET ORAL at 08:00

## 2025-02-24 RX ADMIN — MIDODRINE HYDROCHLORIDE 2.5 MG: 5 TABLET ORAL at 13:18

## 2025-02-24 RX ADMIN — ENOXAPARIN SODIUM 40 MG: 100 INJECTION SUBCUTANEOUS at 08:00

## 2025-02-24 RX ADMIN — CARVEDILOL 12.5 MG: 12.5 TABLET, FILM COATED ORAL at 08:00

## 2025-02-24 RX ADMIN — METOCLOPRAMIDE 10 MG: 5 TABLET ORAL at 17:03

## 2025-02-24 RX ADMIN — PANTOPRAZOLE SODIUM 40 MG: 40 TABLET, DELAYED RELEASE ORAL at 08:00

## 2025-02-24 RX ADMIN — CLOPIDOGREL BISULFATE 75 MG: 75 TABLET ORAL at 08:00

## 2025-02-24 RX ADMIN — CARVEDILOL 12.5 MG: 12.5 TABLET, FILM COATED ORAL at 17:03

## 2025-02-24 RX ADMIN — MIDODRINE HYDROCHLORIDE 2.5 MG: 5 TABLET ORAL at 17:03

## 2025-02-24 RX ADMIN — ATORVASTATIN CALCIUM 20 MG: 20 TABLET, FILM COATED ORAL at 08:00

## 2025-02-24 RX ADMIN — SULFUR HEXAFLUORIDE 2 ML: 60.7; .19; .19 INJECTION, POWDER, LYOPHILIZED, FOR SUSPENSION INTRAVENOUS; INTRAVESICAL at 07:43

## 2025-02-24 NOTE — PROGRESS NOTES
Comprehensive Nutrition Assessment    Type and Reason for Visit:  Positive nutrition screen    Nutrition Recommendations/Plan:   Double protein portions at meals  Felix w/ lunch and dinner  Check A1c     Malnutrition Assessment:  Malnutrition Status:  At risk for malnutrition (02/24/25 1335)    Context:  Acute Illness     Findings of the 6 clinical characteristics of malnutrition:  Energy Intake:  Unable to assess  Weight Loss:  Mild weight loss     Body Fat Loss:  No body fat loss     Muscle Mass Loss:  No muscle mass loss    Fluid Accumulation:  No fluid accumulation     Strength:  Not Performed    Nutrition Assessment:    +NS for wound. Pt reports wound has been present approximately a year. Weight noted to be down slightly in past 90 days. Appetite is good, pt ate well at lunch today. Pt dislikes Ensure type supplements although is agreeable to Felix w/ lunch and dinner, will also add double protein portions at meals. Recommend checking A1c.    Nutrition Related Findings:    Glu 107-308 Wound Type: Pressure Injury, Stage II       Current Nutrition Intake & Therapies:    Average Meal Intake: Unable to assess  Average Supplements Intake: None Ordered  ADULT DIET; Regular; Low Fat/Low Chol/High Fiber/2 gm Na    Anthropometric Measures:  Height: 180.3 cm (5' 10.98\")  Ideal Body Weight (IBW): 172 lbs (78 kg)    Admission Body Weight: 90.7 kg (199 lb 15.3 oz)  Current Body Weight: 90.7 kg (199 lb 15.3 oz), 116.3 % IBW. Weight Source: Not specified  Current BMI (kg/m2): 27.9  Usual Body Weight: 92.7 kg (204 lb 5.9 oz) (per office visit 11-20-24)   % Weight Change (Calculated): -2.2  Weight Adjustment For: No Adjustment   BMI Categories: Overweight (BMI 25.0-29.9)    Estimated Daily Nutrient Needs:  Energy Requirements Based On: Kcal/kg  Weight Used for Energy Requirements: Current  Energy (kcal/day): 4172-3415 (20-25/kg)  Weight Used for Protein Requirements: Ideal  Protein (g/day): 101-156 (1.3-2/kg)  Method Used

## 2025-02-24 NOTE — CONSULTS
Cardiology Consult Note    ADMISSION DAY:  2/23/2025  3:09 PM   Consult Date:  2/24/2025 12:40 PM    Reason for Consultation: Syncope    History of Present Illness: 79-year-old known to me from outpatient care with history of pacemaker placement.  The patient has been having falls due to loss of consciousness since last year.  These primarily occur in the morning time and particularly after he stands up rapidly.  On this particular occasion he had no cardiovascular symptoms but became lightheaded and lost consciousness.  A pulse was not palpated therefore chest compressions were done briefly but the patient had an immediate recovery.  The patient had no neurologic changes otherwise other than symptoms.    No Known Allergies    Current Medications  Current Facility-Administered Medications: insulin lispro (HUMALOG,ADMELOG) injection vial 0-4 Units, 0-4 Units, SubCUTAneous, 4x Daily AC & HS  glucose chewable tablet 16 g, 4 tablet, Oral, PRN  dextrose bolus 10% 125 mL, 125 mL, IntraVENous, PRN **OR** dextrose bolus 10% 250 mL, 250 mL, IntraVENous, PRN  glucagon injection 1 mg, 1 mg, SubCUTAneous, PRN  dextrose 10 % infusion, , IntraVENous, Continuous PRN  atorvastatin (LIPITOR) tablet 20 mg, 20 mg, Oral, Daily  carvedilol (COREG) tablet 12.5 mg, 12.5 mg, Oral, BID WC  clopidogrel (PLAVIX) tablet 75 mg, 75 mg, Oral, Daily  metoclopramide (REGLAN) tablet 10 mg, 10 mg, Oral, TID WC  pantoprazole (PROTONIX) tablet 40 mg, 40 mg, Oral, Daily  sodium chloride flush 0.9 % injection 10 mL, 10 mL, IntraVENous, 2 times per day  sodium chloride flush 0.9 % injection 10 mL, 10 mL, IntraVENous, PRN  0.9 % sodium chloride infusion, , IntraVENous, PRN  potassium chloride (KLOR-CON M) extended release tablet 40 mEq, 40 mEq, Oral, PRN **OR** potassium bicarb-citric acid (EFFER-K) effervescent tablet 40 mEq, 40 mEq, Oral, PRN **OR** potassium chloride 10 mEq/100 mL IVPB (Peripheral Line), 10 mEq, IntraVENous, PRN  magnesium sulfate  9.1   BILITOT 0.7 0.5   ALKPHOS 47 42   AST 17 15   ALT 15 12      Other Electrolytes:  FLP:    Lab Results   Component Value Date/Time    TRIG 86 05/19/2023 09:34 AM    HDL 43 05/19/2023 09:34 AM    LDLDIRECT 83 03/17/2014 12:49 AM     TSH:    Lab Results   Component Value Date/Time    TSH 1.95 03/16/2014 02:40 PM     Troponin T: No results for input(s): \"TROPONINI\" in the last 72 hours.  INR: No results for input(s): \"INR\" in the last 72 hours.    Telemetry:  (I reviewed) sinus rhythm  EKG: (I reviewed) atrial pacing with poor R wave progression    Assessment/Plan:    #1 syncope -device interrogated and functioning normally.  He had no arrhythmia coinciding with the event.  EF 50% by echo and troponins negative.  This is likely neurocardiogenic in origin unless he had a primary neurologic event.  He typically has high blood pressure and has been on medications for same, however intermittently here he has pressures in the 100/50 range.  I think a low-dose of midodrine is reasonable as it would not tend to worsen hypertension but prevent the big drops.  Increasing the carvedilol back to 12.5 which she tolerated in the past should also help  He does have a history of mild coronary disease and a history of cardiomyopathy which has resolved.  We can perform a Fadumo nuclear study as an outpatient     GORAN KRISHNAN MD, 2/24/2025 12:40 PM

## 2025-02-24 NOTE — PROGRESS NOTES
Spiritual Health History and Assessment/Progress Note  [facility]    Spiritual/Emotional Needs,  ,  ,      Name: Aníbal Rizo MRN: [id number]    Age: 79 y.o.     Sex: male   Language: English   Worship: Church   Syncope and collapse     Date: 2/24/2025            Total Time Calculated: 5 min              Spiritual Assessment began in [facility] 4 ONCOLOGY UNIT        Referral/Consult From: Rounding   Encounter Overview/Reason: Spiritual/Emotional Needs  Service Provided For: Patient    Janet, Belief, Meaning:   Patient identifies as spiritual; Sabianist janet  Family/Friends       Importance and Influence:  Patient has spiritual/personal beliefs that influence decisions regarding their health; practice of receiving communion, important  Family/Friends     Community:  Patient is connected with a spiritual community  Family/Friends     Assessment and Plan of Care:     Patient Interventions include: Provided sacramental/Restorationist ritual  Family/Friends Interventions include:     Patient Plan of Care: Other: Open to revisit  Family/Friends Plan of Care:     Electronically signed by LUIS ARMANDO Menezes on 2/24/2025 at 1:08 PM        02/24/25 1305   Encounter Summary   Encounter Overview/Reason Spiritual/Emotional Needs   Encounter Code  Counseling, Individual, by  services   Service Provided For Patient   Referral/Consult From Rounding   Support System Yazidi/janet community   Complexity of Encounter Low   Begin Time 1018   End Time  1023   Total Time Calculated 5 min   Spiritual/Emotional needs   Type Spiritual Support   Rituals, Rites and Sacraments   Type Church Communion   Assessment/Intervention/Outcome   Assessment Calm   Intervention Prayer (assurance of)/[place]   Outcome Comfort   Plan and Referrals   Plan/Referrals Continue Support (comment)   Does the patient have a [facility] PCP? Yes    to follow up after discharge? No     Electronically signed by Ben Rg

## 2025-02-24 NOTE — ACP (ADVANCE CARE PLANNING)
Advance Care Planning     Palliative Team Advance Care Planning (ACP) Conversation    Date of Conversation: 02/24/25    Individuals present for the conversation: Patient with decision making capacity and Legal healthcare agent named below     ACP documents on file prior to discussion:  -None    Previously completed document/s not on file: Patient / participant reports that there are no previously executed ACP documents.    Healthcare Decision Maker:    Primary Decision Maker (Active): Paige Vickers - [place] - [phone]     Conversation Summary:  Spoke with pt about having AD completed. He is willing to do this,  will follow up with pt tomorrow.    Resuscitation Status:   Code Status: DNR     Documentation Completed:  -No new documents completed.    I spent 20 minutes with the patient and/or surrogate decision maker discussing the patient's wishes and goals.      Susana Alex RN     Electronically signed by Susana Alex RN on 2/24/2025 at 1:41 PM

## 2025-02-24 NOTE — CONSULTS
Palliative Care:  Pt known to palliative care. Seen by this nurse in 2022 when pt admitted with COVID.  Pt initiated for support, goals of care, ACP review.  Very pleasant 79 yr old who presented to ED with c/o dizziness and syncope.  Pt is resting in bed this afternoon. Alert, oriented, pleasant and talkative. DtrPaige, is at bedside.               Past Medical History:        Past Medical History:   Diagnosis Date    Arthritis     Atrial fibrillation (HCC) 03/19/2014    s/p cardioversion; sees dr. martinez    Bacteremia due to Gram-positive bacteria 05/19/2022    CAD (coronary artery disease)     sees [facility] cardiology    CHB (complete heart block) (HCC)     Diabetes mellitus (HCC)     GERD (gastroesophageal reflux disease)     Hyperlipidemia     Hypertension     Ischemic dilated cardiomyopathy (HCC)     Knee pain     Mitral regurgitation     Pacemaker 03/31/2014    medtronic    Palliative care patient 02/24/2025       Advance Directives:   DNR            Pain/Other Symptoms:  Denies current or chronic pain.             How are symptoms affecting QOL   Pt states overall \"I have good quality of life\".  Pt still able to live alone and manage his daily life/ADLs.        Psychological/Spiritual:  Attends [employer]. Limited family support, one dtr.                    Plan:   Cardiologist consulted and has seen pt.  Medication adj to pt daily routine.  Possible home tomorrow once PT/OT has assessed.         Patient/family discussion r/t goals:           (what does living well look like to you)?            Pt states he is active, more in the summer. He likes to garden and work in his garage.  Pt curious what could be causing dizziness. Hopeful meds will work.  We discussed life alert in the event he had a future fall or other incident he would have a way to reach assistance. Pt will consider.      Palliative Performance Scale:        80    Palliative Care team will continue to follow and support, with ongoing

## 2025-02-24 NOTE — PROGRESS NOTES
4 Eyes Skin Assessment     NAME:  Aníbal Rizo  YOB: 1945  MEDICAL RECORD NUMBER:  [id number]    The patient is being assessed for  Admission    I agree that at least one RN has performed a thorough Head to Toe Skin Assessment on the patient. ALL assessment sites listed below have been assessed.      Areas assessed by both nurses:    Head, Face, Ears, Shoulders, Back, Chest, Arms, Elbows, Hands, and Sacrum. Buttock, Coccyx, Ischium        Does the Patient have a Wound? Yes         Warren Prevention initiated by RN: Yes  Wound Care Orders initiated by RN: Yes    Pressure Injury (Stage 3,4, Unstageable, DTI, NWPT, and Complex wounds) if present, place Wound referral order by RN under : No    New Ostomies, if present place, Ostomy referral order under : No     Nurse 1 eSignature: Electronically signed by Soto Serrano RN on 2/23/25 at 9:58 PM CST    **SHARE this note so that the co-signing nurse can place an eSignature**    Nurse 2 eSignature: Electronically signed by Ligia Rader RN on 2/23/25 at 10:00 PM CST

## 2025-02-24 NOTE — PROGRESS NOTES
Daily Progress Note    Date:2025  Patient: Aníbal Rizo  : 1945  MRN:[id number]  CODE:DNR No additional code details  PCP:Arsen Stewart MD    Admit Date: 2025  3:09 PM   LOS: 1 day       Subjective:     Seen and examined this a.m.   denies any lightheadedness, dizziness, shortness of breath, chest pain or palpitations.      Summary:   79-year-old male history of valve repair, CABG, and Torrez-Maze procedure in the past. He had a pacemaker placed for subsequent development of complete AV block. He did have a appendage ligation and is off the anticoagulation.  He has chronic systolic heart failure with EF 40%.  He presented following a syncopal episode at home that was preceded by transient slurred speech after he ate his meal.  Episode occurred at rest without standing or any exertion.  No reported prodromal symptoms.  He has had previous history of syncope and falls.  Has some symptoms concerning for possible parkinsonism with intermittent shuffling gait and tremor.  He was admitted with telemetry monitoring, echocardiogram obtained, interrogated pacemaker.  He initially received some IV fluids due to concern for possible orthostasis contributing to this event.      Objective:      Vital signs in last 24 hours:  Patient Vitals for the past 24 hrs:   BP Temp Temp src Pulse Resp SpO2 Height Weight   25 1138 125/68 97.2 °F (36.2 °C) -- 65 16 97 % -- --   25 0950 114/62 -- -- 65 -- 97 % -- --   25 0949 139/62 -- -- 72 -- 97 % -- --   25 0947 130/62 -- -- 76 -- 97 % -- --   25 0921 -- -- -- -- -- 96 % -- --   25 0830 (!) 126/59 97.5 °F (36.4 °C) -- 72 16 95 % -- --   25 0658 127/65 -- -- -- -- -- 1.803 m (5' 11\") 90.7 kg (200 lb)   25 2359 127/65 97.3 °F (36.3 °C) -- 69 16 97 % -- --   25 1819 (!) 147/65 97.2 °F (36.2 °C) Temporal 70 16 98 % -- --   25 1800 (!) 141/58 -- -- 65 16 96 % -- --   25 1700 129/62 -- -- 67 18 97 % -- --  ambulation      Fernie Petit MD 2/24/2025 12:26 PM

## 2025-02-25 VITALS
WEIGHT: 200 LBS | OXYGEN SATURATION: 94 % | HEIGHT: 71 IN | BODY MASS INDEX: 28 KG/M2 | HEART RATE: 71 BPM | TEMPERATURE: 97.3 F | RESPIRATION RATE: 16 BRPM | SYSTOLIC BLOOD PRESSURE: 142 MMHG | DIASTOLIC BLOOD PRESSURE: 68 MMHG

## 2025-02-25 PROBLEM — L89.301 PRESSURE INJURY OF BUTTOCK, STAGE 1: Status: ACTIVE | Noted: 2025-02-25

## 2025-02-25 LAB
EKG P AXIS: 71 DEGREES
EKG P-R INTERVAL: 166 MS
EKG Q-T INTERVAL: 420 MS
EKG QRS DURATION: 86 MS
EKG QTC CALCULATION (BAZETT): 427 MS
EKG T AXIS: -83 DEGREES
GLUCOSE BLD-MCNC: 137 MG/DL (ref 70–99)
PERFORMED ON: ABNORMAL

## 2025-02-25 PROCEDURE — 94760 N-INVAS EAR/PLS OXIMETRY 1: CPT

## 2025-02-25 PROCEDURE — 93010 ELECTROCARDIOGRAM REPORT: CPT | Performed by: INTERNAL MEDICINE

## 2025-02-25 PROCEDURE — 6370000000 HC RX 637 (ALT 250 FOR IP): Performed by: INTERNAL MEDICINE

## 2025-02-25 PROCEDURE — 2500000003 HC RX 250 WO HCPCS: Performed by: INTERNAL MEDICINE

## 2025-02-25 PROCEDURE — 6360000002 HC RX W HCPCS: Performed by: INTERNAL MEDICINE

## 2025-02-25 PROCEDURE — 99232 SBSQ HOSP IP/OBS MODERATE 35: CPT | Performed by: INTERNAL MEDICINE

## 2025-02-25 PROCEDURE — 82962 GLUCOSE BLOOD TEST: CPT

## 2025-02-25 RX ORDER — MIDODRINE HYDROCHLORIDE 2.5 MG/1
2.5 TABLET ORAL
Qty: 270 TABLET | Refills: 0 | Status: SHIPPED | OUTPATIENT
Start: 2025-02-25

## 2025-02-25 RX ORDER — MAGNESIUM CARB/ALUMINUM HYDROX 105-160MG
1 TABLET,CHEWABLE ORAL 2 TIMES DAILY
Qty: 454 G | Refills: 0 | Status: SHIPPED | OUTPATIENT
Start: 2025-02-25

## 2025-02-25 RX ADMIN — SODIUM CHLORIDE, PRESERVATIVE FREE 10 ML: 5 INJECTION INTRAVENOUS at 09:52

## 2025-02-25 RX ADMIN — MIDODRINE HYDROCHLORIDE 2.5 MG: 5 TABLET ORAL at 12:21

## 2025-02-25 RX ADMIN — CLOPIDOGREL BISULFATE 75 MG: 75 TABLET ORAL at 09:51

## 2025-02-25 RX ADMIN — MIDODRINE HYDROCHLORIDE 2.5 MG: 5 TABLET ORAL at 09:51

## 2025-02-25 RX ADMIN — ATORVASTATIN CALCIUM 20 MG: 20 TABLET, FILM COATED ORAL at 09:51

## 2025-02-25 RX ADMIN — PANTOPRAZOLE SODIUM 40 MG: 40 TABLET, DELAYED RELEASE ORAL at 09:51

## 2025-02-25 RX ADMIN — METOCLOPRAMIDE 10 MG: 5 TABLET ORAL at 09:51

## 2025-02-25 RX ADMIN — ENOXAPARIN SODIUM 40 MG: 100 INJECTION SUBCUTANEOUS at 09:52

## 2025-02-25 RX ADMIN — CARVEDILOL 12.5 MG: 12.5 TABLET, FILM COATED ORAL at 09:51

## 2025-02-25 RX ADMIN — METOCLOPRAMIDE 10 MG: 5 TABLET ORAL at 12:21

## 2025-02-25 NOTE — DISCHARGE SUMMARY
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 Left ventricle size is normal. Mild septal thickening. See diagram for wall motion findings.   Aortic Valve: Trileaflet valve. Mildly thickened cusps. Mild annular calcification. Mild to moderate regurgitation.   Mitral Valve: Valve repaired. Mildly thickened leaflets. Moderate regurgitation with a centrally directed jet. Mild stenosis noted. MV mean gradient is 4-6 mmHg.   Image quality is adequate. Contrast used: Lumason.     CT HEAD WO CONTRAST    Result Date: 2025  EXAMINATION: HEAD CT WITHOUT CONTRAST  HISTORY: Syncope.  Possible head injury.  TECHNIQUE: Noncontrast CT of the brain was performed with images acquired from skull base to vertex.  2-D coronal and sagittal reformatted images were obtained from the axial source images. Contrast Dose: None. CT Dose Reduction Techniques Performed: Yes.  COMPARISON: 10/16/2024  FINDINGS: There is moderate prominence of the ventricles and sulci consistent with chronic cerebral atrophy There is moderate low attenuation in the white matter of both cerebral hemispheres, most likely reflecting chronic microvascular ischemia Chronic  Episode occurred at rest without standing or any exertion.  No reported prodromal symptoms.  He has had previous history of syncope and falls.  Has some symptoms concerning for possible parkinsonism with intermittent shuffling gait and tremor.  He was admitted with telemetry monitoring, echocardiogram obtained, interrogated pacemaker.  He initially received some IV fluids due to concern for possible orthostasis contributing to this event.  After further evaluation including cardiology consultation, presentation felt to be most likely neurocardiogenic syncope with possible underlying autonomic dysfunction, started on low-dose midodrine while continuing on current dose of Coreg.  Will stop lisinopril for now.  There were no significant issues with pacemaker interrogation and no severe valvular pathology on echocardiogram.  Repeat orthostatics were negative.  Patient medically stable for discharge home with home health, encouraged to use walker at home to reduce fall risk.  Follow-up with PCP within a week and referred for outpatient evaluation with neurology.    He also has a stage I pressure injury of buttock and was encouraged to offload, turn more frequently and apply petroleum jelly ointment for skin protection.        Physical Exam:  Vital Signs: BP (!) 142/68   Pulse 71   Temp 97.3 °F (36.3 °C) (Temporal)   Resp 16   Ht 1.803 m (5' 10.98\")   Wt 90.7 kg (200 lb)   SpO2 94%   BMI 27.91 kg/m²   General appearance:.Alert and Cooperative   HEENT: Normocephalic.  Chest: clear to auscultation bilaterally without wheezes or rhonchi.  Cardiac: Normal heart rate with regular rhythm  Extremities: No clubbing or cyanosis.  Neurologic: Normal speech, no focal weakness  Skin: Stage I pressure injury noted over buttock, present on admission    Discharge Medications:         Medication List        START taking these medications      midodrine 2.5 MG tablet  Commonly known as: PROAMATINE  Take 1 tablet by mouth 3 times

## 2025-02-25 NOTE — CARE COORDINATION
Home health accepted [facility] . [place] summary has been sent to .   Fax. [phone]  Electronically signed by CHENCHO CARRERA on 2/25/2025 at 11:36 AM

## 2025-02-25 NOTE — PROGRESS NOTES
[facility] MEDICINE  - PROGRESS NOTE    Admit Date: 2/23/2025 2/25/2025    Subjective: Pt feels fine on midodrine.     Objective:   Vitals: BP (!) 142/68   Pulse 71   Temp 97.3 °F (36.3 °C) (Temporal)   Resp 16   Ht 1.803 m (5' 10.98\")   Wt 90.7 kg (200 lb)   SpO2 94%   BMI 27.91 kg/m²   General appearance: alert, appears stated age and cooperative  Skin: Skin color, texture, turgor normal.   HEENT: Head: Normocephalic, no lesions, without obvious abnormality.  Neck: no adenopathy, no carotid bruit, no JVD, supple, symmetrical, trachea midline, and thyroid not enlarged, symmetric, no tenderness/mass/nodules  Lungs: clear to auscultation bilaterally  Heart: regular rate and rhythm, S1, S2 normal, no murmur, click, rub or gallop  Abdomen: soft, non-tender; bowel sounds normal; no masses,  no organomegaly  Extremities: extremities normal, atraumatic, no cyanosis or edema  Lymphatic: No significant lymph node enlargement papable  Neurologic: Mental status: Alert, oriented, thought content appropriate      Data:   Scheduled Meds: Reviewed  Continuous Infusions:   dextrose      sodium chloride       No intake or output data in the 24 hours ending 02/25/25 1219  CBC:   Recent Labs     02/24/25  0223   WBC 7.2   HGB 12.3*        BMP:  Recent Labs     02/24/25  0223      K 4.1      CO2 23   BUN 19   CREATININE 0.9   GLUCOSE 139*     ABGs:   Lab Results   Component Value Date/Time    PHART 7.280 05/17/2022 02:12 AM    PO2ART 107.0 05/17/2022 02:12 AM    VVM3MAT 24.0 05/17/2022 02:12 AM     INR: No results for input(s): \"INR\" in the last 72 hours.  -----------------------------------------------------------------            Assessment/Plan    Patient Active Problem List:     Paroxysmal atrial fibrillation with rapid ventricular response (HCC)     CHB (complete heart block) (HCC)     Pacemaker     Essential hypertension     Mitral

## 2025-02-25 NOTE — PLAN OF CARE
Problem: Chronic Conditions and Co-morbidities  Goal: Patient's chronic conditions and co-morbidity symptoms are monitored and maintained or improved  Outcome: Progressing     Problem: ABCDS Injury Assessment  Goal: Absence of physical injury  Outcome: Progressing  Flowsheets (Taken 2/24/2025 2316)  Absence of Physical Injury: Implement safety measures based on patient assessment     Problem: Safety - Adult  Goal: Free from fall injury  Outcome: Progressing  Flowsheets (Taken 2/24/2025 2316)  Free From Fall Injury: Instruct family/caregiver on patient safety     Problem: Nutrition Deficit:  Goal: Optimize nutritional status  Outcome: Progressing

## 2025-02-25 NOTE — PROGRESS NOTES
25 1130   Encounter Summary   Encounter Overview/Reason Advance Care Planning   Encounter Code  Assessment by  services   Service Provided For Patient   Referral/Consult From Palliative Care   Support System Family members;Scientologist/lamont community   Complexity of Encounter Moderate   Begin Time 0940   End Time  1010   Total Time Calculated 30 min   Spiritual/Emotional needs   Type Spiritual Support   Palliative Care   Type Palliative Care, Follow-up   Advance Care Planning   Type Completed AD/ACP document(s)   Assessment/Intervention/Outcome   Assessment Calm;Coping   Intervention Active listening;Sustaining Presence/Ministry of presence  (Assisted pt with completing a LW.)   Outcome Acceptance;Expressed Gratitude   Plan and Referrals   Plan/Referrals Other (Comment)  (Pt is discharging today.)   Does the patient have a [facility] PCP? Yes    to follow up after discharge? No       Palliative care nurse asked this  to assist the pt with completing a LW. Pt was identified with his armband and MRN and . Provided the document and pt named his daughter Paige Vickers as his primary and only decision maker. Pt authorized his surrogate to withhold or withdraw treatment per the document. Pt also made an advance care planning decision. Pt signed the notary log and initialed and signed the document. This  witnessed the pt initial and sign and notarized the document. Original and copies were given to pt and a copy was emailed to Bisi Daltno and is already scanned into pt's chart. Pt expressed gratitude for visit with assistance completing the LW document. Pt is Adventism and has been given communion in the hospital.       Spiritual Health History and Assessment/Progress Note  [facility]    (P) Advance Care Planning,  ,  ,      Name: Aníbal Rizo MRN: [id number]    Age: 79 y.o.     Sex: male   Language: English   Zoroastrian: Adventism   Syncope and collapse     Date:  2/25/2025            Total Time Calculated: (P) 30 min              Spiritual Assessment continued in [facility] 4 ONCOLOGY UNIT        Referral/Consult From: (P) Palliative Care   Encounter Overview/Reason: (P) Advance Care Planning  Service Provided For: (P) Patient    Janet, Belief, Meaning:   Patient identifies as spiritual and is connected with a janet tradition or spiritual practice  Family/Friends No family/friends present      Importance and Influence:  Patient has spiritual/personal beliefs that influence decisions regarding their health  Family/Friends No family/friends present    Community:  Patient is connected with a spiritual community  Family/Friends No family/friends present    Assessment and Plan of Care:     Patient Interventions include: Assisted in Advance Care Planning conversation Pt completed a LW.  Family/Friends Interventions include: No family/friends present    Patient Plan of Care: Other: Pt is discharging.   Family/Friends Plan of Care: No family/friends present    Electronically signed by Mary Behrens, Chaplain on 2/25/2025 at 11:35 AM

## 2025-02-26 ENCOUNTER — TELEPHONE (OUTPATIENT)
Dept: PRIMARY CARE CLINIC | Age: 80
End: 2025-02-26

## 2025-02-26 NOTE — TELEPHONE ENCOUNTER
Care Transitions Initial Follow Up Call    Outreach made within 2 business days of discharge: Yes    Patient: Aníbal Rizo   Patient : 1945   MRN: [id number]    Reason for Admission: Syncope and collapse   Discharge Date: 25       Spoke with: Patient    Discharge department/facility: [facility] Interactive Patient Contact:  Was patient able to fill all prescriptions: Yes  Was patient instructed to bring all medications to the follow-up visit: Yes  Is patient taking all medications as directed in the discharge summary? Yes  Does patient understand their discharge instructions: Yes  Does patient have questions or concerns that need addressed prior to 7-14 day follow up office visit: No    Additional needs identified to be addressed with provider  No needs identified             Scheduled appointment with PCP within 7-14 days    Spoke with patient. He states he feels great and does not see a need for an appointment. He asked me to cancel his HFU and stated he would call back when he wants to be seen. He will be following up with neurology.     Follow Up  Future Appointments   Date Time Provider Department Center   2025  3:20 PM Arsen Stewart MD [facility] DEP   3/12/2025  9:00 AM Tal Paez MD [facility] NEURO Neurology -   2025 10:45 AM Gopi Strauss MD [facility] Cardio [id number]       Klaudia Hendrix MA

## 2025-03-03 NOTE — PROGRESS NOTES
Physician Progress Note      PATIENT:               SULY PARTIDA  CSN #:                  [id number]  :                       1945  ADMIT DATE:       2025 3:09 PM  DISCH DATE:        2025 3:15 PM  RESPONDING  PROVIDER #:        Fernie Petit MD          QUERY TEXT:    Patient admitted with syncopal spell noted to have transient slurred speech.   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 Possible underlying   autonomic dysfunction. Has some symptoms concerning for possible parkinsonism  with intermittent shuffling gait and tremor.  IVF for orthostasis contributing   to this event.  Treatment: Midodrine, Stop Lisinopril. IVF  100 ml/hr.    Thank you  Bertha Joel RN, BSN, [facility]  [phone]  Options provided:  -- Syncope due to TIA  -- Syncope due to a fib  -- Syncope due to orthostatic hypotension secondary to Parkinson's autonomic   neuropathy  -- Syncope due to other hypotension  -- Syncope due to peripheral autonomic neuropathy  -- Syncope due to, please specify  -- Other - I will add my own diagnosis  -- Disagree - Not applicable / Not valid  -- Disagree - Clinically unable to determine / Unknown  -- Refer to Clinical Documentation Reviewer    PROVIDER RESPONSE TEXT:    Neurocardiogenic (vasovagal) syncope    Query created by: Bertha Joel on 3/3/2025 12:21 PM      Electronically signed by:  Fernie Petit MD 3/3/2025 4:21 PM

## 2025-03-12 ENCOUNTER — OFFICE VISIT (OUTPATIENT)
Dept: NEUROLOGY | Age: 80
End: 2025-03-12
Payer: OTHER GOVERNMENT

## 2025-03-12 VITALS
BODY MASS INDEX: 28.63 KG/M2 | HEART RATE: 72 BPM | WEIGHT: 200 LBS | DIASTOLIC BLOOD PRESSURE: 70 MMHG | HEIGHT: 70 IN | SYSTOLIC BLOOD PRESSURE: 126 MMHG

## 2025-03-12 DIAGNOSIS — R25.1 TREMOR: Primary | ICD-10-CM

## 2025-03-12 DIAGNOSIS — I10 ESSENTIAL HYPERTENSION: ICD-10-CM

## 2025-03-12 DIAGNOSIS — R55 SYNCOPE AND COLLAPSE: ICD-10-CM

## 2025-03-12 DIAGNOSIS — R26.9 GAIT ABNORMALITY: ICD-10-CM

## 2025-03-12 DIAGNOSIS — I48.0 PAROXYSMAL ATRIAL FIBRILLATION WITH RAPID VENTRICULAR RESPONSE (HCC): ICD-10-CM

## 2025-03-12 PROCEDURE — 1123F ACP DISCUSS/DSCN MKR DOCD: CPT | Performed by: PSYCHIATRY & NEUROLOGY

## 2025-03-12 PROCEDURE — 99204 OFFICE O/P NEW MOD 45 MIN: CPT | Performed by: PSYCHIATRY & NEUROLOGY

## 2025-03-12 PROCEDURE — 1159F MED LIST DOCD IN RCRD: CPT | Performed by: PSYCHIATRY & NEUROLOGY

## 2025-03-12 PROCEDURE — 3074F SYST BP LT 130 MM HG: CPT | Performed by: PSYCHIATRY & NEUROLOGY

## 2025-03-12 PROCEDURE — 3078F DIAST BP <80 MM HG: CPT | Performed by: PSYCHIATRY & NEUROLOGY

## 2025-03-12 NOTE — PROGRESS NOTES
Chief Complaint   Patient presents with    New Patient     Syncope/collapse and Parkinsons       Aníbal Rizo is a 79 y.o. year old male who is seen for evaluation of tremor and syncope.  The patient indicates that he has had tremors since about 1 year.  This occurs when he is trying to do something.  He denies any family history of tremor.  He does take Reglan.  About a month ago he had a syncopal episode which was thought to be related to medications versus autonomic dysfunction.  He does have diabetes.    Active Ambulatory Problems     Diagnosis Date Noted    Paroxysmal atrial fibrillation with rapid ventricular response ([facility]) 03/19/2014    CHB (complete heart block) ([facility])     Pacemaker     Essential hypertension     Mitral regurgitation 04/22/2014    Chronic systolic heart failure ([facility]) 04/22/2014    CAD (coronary artery disease)     Mixed hyperlipidemia 08/16/2017    Nonischemic cardiomyopathy ([facility]) 11/02/2021    Acute hypoxemic respiratory failure due to COVID-19 ([facility]) 05/17/2022    Type 2 diabetes mellitus with hyperglycemia ([facility]) 05/18/2022    Calculus of gallbladder and bile duct with obstruction without cholecystitis 05/27/2022    Effusion of right knee joint 05/17/2023    Tear of medial meniscus of knee 05/17/2023    Gastroesophageal reflux disease 05/18/2023    Myalgia 05/18/2023    Arthritis of knee 08/21/2023    Drug-induced constipation 08/21/2023    Primary osteoarthritis of right knee 08/22/2023    Primary osteoarthritis of left knee 11/20/2024    Chronic pain of left knee 11/20/2024    Syncope and collapse 02/23/2025    Palliative care patient 02/24/2025    Pressure injury of buttock, stage 1 02/25/2025     Resolved Ambulatory Problems     Diagnosis Date Noted    Screening PSA (prostate specific antigen) 08/16/2017    Bacteremia due to Gram-positive bacteria 05/19/2022     Past Medical History:   Diagnosis Date    Arthritis     Atrial fibrillation ([facility]) 03/19/2014    Diabetes mellitus ([facility])

## 2025-05-04 DIAGNOSIS — E78.5 HYPERLIPIDEMIA, UNSPECIFIED HYPERLIPIDEMIA TYPE: ICD-10-CM

## 2025-05-05 RX ORDER — LISINOPRIL 40 MG/1
40 TABLET ORAL DAILY
Qty: 90 TABLET | Refills: 3 | OUTPATIENT
Start: 2025-05-05

## 2025-05-05 RX ORDER — ATORVASTATIN CALCIUM 20 MG/1
20 TABLET, FILM COATED ORAL DAILY
Qty: 90 TABLET | Refills: 0 | Status: SHIPPED | OUTPATIENT
Start: 2025-05-05

## 2025-05-05 RX ORDER — CARVEDILOL 12.5 MG/1
12.5 TABLET ORAL 2 TIMES DAILY
Qty: 180 TABLET | Refills: 0 | Status: SHIPPED | OUTPATIENT
Start: 2025-05-05

## 2025-05-05 RX ORDER — CLOPIDOGREL BISULFATE 75 MG/1
75 TABLET ORAL DAILY
Qty: 90 TABLET | Refills: 3 | OUTPATIENT
Start: 2025-05-05

## 2025-05-05 RX ORDER — CLOPIDOGREL BISULFATE 75 MG/1
TABLET ORAL
Qty: 90 TABLET | Refills: 0 | Status: SHIPPED | OUTPATIENT
Start: 2025-05-05

## 2025-05-23 ENCOUNTER — TELEPHONE (OUTPATIENT)
Dept: CARDIOLOGY CLINIC | Age: 80
End: 2025-05-23

## 2025-05-29 ENCOUNTER — TELEPHONE (OUTPATIENT)
Dept: CARDIOLOGY CLINIC | Age: 80
End: 2025-05-29

## 2025-05-29 NOTE — TELEPHONE ENCOUNTER
I spoke w/the Pt RE: their device report that is due to be sent in. They verbalized an understanding and will be sending a report in as soon as they are able. 5/29/25 garima

## 2025-08-05 RX ORDER — CARVEDILOL 12.5 MG/1
12.5 TABLET ORAL 2 TIMES DAILY
Qty: 180 TABLET | Refills: 0 | Status: SHIPPED | OUTPATIENT
Start: 2025-08-05

## 2025-08-05 RX ORDER — CLOPIDOGREL BISULFATE 75 MG/1
75 TABLET ORAL DAILY
Qty: 90 TABLET | Refills: 0 | Status: SHIPPED | OUTPATIENT
Start: 2025-08-05

## (undated) DEVICE — 3M™ STERI-DRAPE™ INSTRUMENT POUCH 1018: Brand: STERI-DRAPE™

## (undated) DEVICE — DUAL CUT SAGITTAL BLADE

## (undated) DEVICE — ADHESIVE SKIN CLOSURE WND 8.661X1.5 IN 22 CM LIQUIBAND SECUR

## (undated) DEVICE — PIN FIX L150MM DIA3.2MM FLUT CAS

## (undated) DEVICE — GLOVE SURG SZ 75 CRM LTX FREE POLYISOPRENE POLYMER BEAD ANTI

## (undated) DEVICE — NEPTUNE E-SEP SMOKE EVACUATION PENCIL, COATED, 70MM BLADE, ROCKER SWITCH: Brand: NEPTUNE E-SEP

## (undated) DEVICE — ZIMMER® STERILE DISPOSABLE TOURNIQUET CUFF WITH PLC, DUAL PORT, SINGLE BLADDER, 34 IN. (86 CM)

## (undated) DEVICE — ROYAL SILK SURGICAL GOWN, XXL: Brand: CONVERTORS

## (undated) DEVICE — SURGICAL PROCEDURE PACK KNEE TOT DBD CDS LOURDES HOSP LF

## (undated) DEVICE — PAD,NON-ADHERENT,3X8,STERILE,LF,1/PK: Brand: MEDLINE

## (undated) DEVICE — GLOVE SURG SZ 85 CRM LTX FREE POLYISOPRENE POLYMER BEAD ANTI

## (undated) DEVICE — FAN SPRAY KIT: Brand: PULSAVAC®

## (undated) DEVICE — SUTURE VCRL SZ 2-0 L36IN ABSRB UD L36MM CT-1 1/2 CIR J945H

## (undated) DEVICE — UNDERGLOVE SURG SZ 8 FNGR THK0.21MIL GRN LTX BEAD CUF

## (undated) DEVICE — SHEET,DRAPE,53X77,STERILE: Brand: MEDLINE

## (undated) DEVICE — DRESSING FOAM ABSORBENT 8X4 IN BORDER SELF ADH MEPILEX

## (undated) DEVICE — INSTRUMENT KIT ORTHOPEDIC KNEE NAVITRACK

## (undated) DEVICE — ROSA RBTC UNT 20 DROP

## (undated) DEVICE — GLOVE SURG SZ 85 L12IN FNGR THK79MIL GRN LTX FREE

## (undated) DEVICE — SOLUTION IRRIG 3000ML 0.9% SOD CHL USP UROMATIC PLAS CONT

## (undated) DEVICE — PIN FIX L80MM DIA3.2MM STRL FLUT CAS

## (undated) DEVICE — SUTURE VCRL SZ 1 L18IN ABSRB UD L36MM CT-1 1/2 CIR J841D

## (undated) DEVICE — GLOVE SURG SZ 8 L12IN FNGR THK79MIL GRN LTX FREE

## (undated) DEVICE — E-Z CLEAN, NON-STICK, PTFE COATED, ELECTROSURGICAL BLADE ELECTRODE, MODIFIED EXTENDED INSULATION, 4 INCH (10.2 CM): Brand: MEGADYNE

## (undated) DEVICE — SUTURE VCRL SZ 2-0 L27IN ABSRB UD L26MM SH 1/2 CIR J417H

## (undated) DEVICE — CEMENT MIXING SYSTEM WITH FEMORAL BREAKWAY NOZZLE: Brand: REVOLUTION

## (undated) DEVICE — GOWN,PREVENTION PLUS,XL,ST,24/CS: Brand: MEDLINE

## (undated) DEVICE — BANDAGE COMPR W6INXL12FT SMOOTH FOR LIMB EXSANG ESMARCH